# Patient Record
Sex: FEMALE | Race: WHITE | Employment: OTHER | URBAN - NONMETROPOLITAN AREA
[De-identification: names, ages, dates, MRNs, and addresses within clinical notes are randomized per-mention and may not be internally consistent; named-entity substitution may affect disease eponyms.]

---

## 2017-01-28 ENCOUNTER — HOSPITAL ENCOUNTER (EMERGENCY)
Age: 82
Discharge: HOME | End: 2017-01-28
Attending: EMERGENCY MEDICINE
Payer: MEDICARE

## 2017-01-28 ENCOUNTER — APPOINTMENT (OUTPATIENT)
Dept: CT IMAGING | Age: 82
End: 2017-01-28
Payer: MEDICARE

## 2017-01-28 VITALS
HEART RATE: 59 BPM | DIASTOLIC BLOOD PRESSURE: 88 MMHG | WEIGHT: 138 LBS | TEMPERATURE: 98.3 F | BODY MASS INDEX: 23.56 KG/M2 | RESPIRATION RATE: 18 BRPM | SYSTOLIC BLOOD PRESSURE: 177 MMHG | HEIGHT: 64 IN | OXYGEN SATURATION: 95 %

## 2017-01-28 DIAGNOSIS — R42 DIZZINESS: Primary | ICD-10-CM

## 2017-01-28 LAB
ALBUMIN SERPL-MCNC: 3.7 G/DL (ref 3.5–5.2)
ALP BLD-CCNC: 72 U/L (ref 35–104)
ALT SERPL-CCNC: 16 U/L (ref 5–33)
ANION GAP SERPL CALCULATED.3IONS-SCNC: 12 MMOL/L (ref 7–19)
AST SERPL-CCNC: 23 U/L (ref 5–32)
BASOPHILS ABSOLUTE: 0.1 K/UL (ref 0–0.2)
BASOPHILS RELATIVE PERCENT: 0.7 % (ref 0–1)
BILIRUB SERPL-MCNC: 0.5 MG/DL (ref 0.2–1.2)
BUN BLDV-MCNC: 21 MG/DL (ref 8–23)
CALCIUM SERPL-MCNC: 9 MG/DL (ref 8.8–10.2)
CHLORIDE BLD-SCNC: 103 MMOL/L (ref 98–111)
CO2: 26 MMOL/L (ref 22–29)
CREAT SERPL-MCNC: 1 MG/DL (ref 0.5–0.9)
EOSINOPHILS ABSOLUTE: 0.3 K/UL (ref 0–0.6)
EOSINOPHILS RELATIVE PERCENT: 3.3 % (ref 0–5)
GFR NON-AFRICAN AMERICAN: 52
GLOBULIN: 2.9 G/DL
GLUCOSE BLD-MCNC: 96 MG/DL (ref 74–109)
HCT VFR BLD CALC: 40.6 % (ref 37–47)
HEMOGLOBIN: 13.5 G/DL (ref 12–16)
LYMPHOCYTES ABSOLUTE: 1.7 K/UL (ref 1.1–4.5)
LYMPHOCYTES RELATIVE PERCENT: 21.3 % (ref 20–40)
MCH RBC QN AUTO: 31.1 PG (ref 27–31)
MCHC RBC AUTO-ENTMCNC: 33.3 G/DL (ref 33–37)
MCV RBC AUTO: 93.5 FL (ref 81–99)
MONOCYTES ABSOLUTE: 0.8 K/UL (ref 0–0.9)
MONOCYTES RELATIVE PERCENT: 10 % (ref 0–10)
NEUTROPHILS ABSOLUTE: 5.2 K/UL (ref 1.5–7.5)
NEUTROPHILS RELATIVE PERCENT: 64.7 % (ref 50–65)
PDW BLD-RTO: 12.9 % (ref 11.5–14.5)
PLATELET # BLD: 219 K/UL (ref 130–400)
PMV BLD AUTO: 10.3 FL (ref 7.4–10.4)
POTASSIUM SERPL-SCNC: 4.4 MMOL/L (ref 3.5–5)
RBC # BLD: 4.34 M/UL (ref 4.2–5.4)
SODIUM BLD-SCNC: 141 MMOL/L (ref 136–145)
TOTAL PROTEIN: 6.6 G/DL (ref 6.6–8.7)
WBC # BLD: 8.1 K/UL (ref 4.8–10.8)

## 2017-01-28 PROCEDURE — 80053 COMPREHEN METABOLIC PANEL: CPT

## 2017-01-28 PROCEDURE — 2580000003 HC RX 258: Performed by: EMERGENCY MEDICINE

## 2017-01-28 PROCEDURE — 93005 ELECTROCARDIOGRAM TRACING: CPT

## 2017-01-28 PROCEDURE — 99283 EMERGENCY DEPT VISIT LOW MDM: CPT | Performed by: EMERGENCY MEDICINE

## 2017-01-28 PROCEDURE — 70450 CT HEAD/BRAIN W/O DYE: CPT

## 2017-01-28 PROCEDURE — 99284 EMERGENCY DEPT VISIT MOD MDM: CPT

## 2017-01-28 PROCEDURE — 85025 COMPLETE CBC W/AUTO DIFF WBC: CPT

## 2017-01-28 PROCEDURE — 36415 COLL VENOUS BLD VENIPUNCTURE: CPT

## 2017-01-28 RX ORDER — BISOPROLOL FUMARATE AND HYDROCHLOROTHIAZIDE 2.5; 6.25 MG/1; MG/1
1 TABLET ORAL DAILY
COMMUNITY
End: 2017-12-01 | Stop reason: SDUPTHER

## 2017-01-28 RX ORDER — METOPROLOL TARTRATE 50 MG/1
50 TABLET, FILM COATED ORAL 2 TIMES DAILY
COMMUNITY
End: 2018-04-11 | Stop reason: SDUPTHER

## 2017-01-28 RX ORDER — SACCHAROMYCES BOULARDII 250 MG
250 CAPSULE ORAL 2 TIMES DAILY
COMMUNITY

## 2017-01-28 RX ORDER — CHOLECALCIFEROL (VITAMIN D3) 125 MCG
5 CAPSULE ORAL NIGHTLY
COMMUNITY

## 2017-01-28 RX ORDER — CALCIUM CARBONATE 500(1250)
500 TABLET ORAL DAILY
COMMUNITY

## 2017-01-28 RX ORDER — 0.9 % SODIUM CHLORIDE 0.9 %
500 INTRAVENOUS SOLUTION INTRAVENOUS ONCE
Status: COMPLETED | OUTPATIENT
Start: 2017-01-28 | End: 2017-01-28

## 2017-01-28 RX ORDER — ACETAMINOPHEN 160 MG
1000 TABLET,DISINTEGRATING ORAL DAILY
COMMUNITY

## 2017-01-28 RX ORDER — ACETAMINOPHEN,DIPHENHYDRAMINE HCL 500; 25 MG/1; MG/1
1 TABLET, FILM COATED ORAL NIGHTLY PRN
COMMUNITY
End: 2021-01-01

## 2017-01-28 RX ORDER — CELECOXIB 200 MG/1
200 CAPSULE ORAL 2 TIMES DAILY
COMMUNITY
End: 2018-06-12 | Stop reason: SDUPTHER

## 2017-01-28 RX ADMIN — SODIUM CHLORIDE 500 ML: 9 INJECTION, SOLUTION INTRAVENOUS at 15:09

## 2017-01-29 ASSESSMENT — ENCOUNTER SYMPTOMS
SHORTNESS OF BREATH: 0
WHEEZING: 0
CHEST TIGHTNESS: 0

## 2017-01-31 ENCOUNTER — HOSPITAL ENCOUNTER (OUTPATIENT)
Dept: WOMENS IMAGING | Age: 82
Discharge: HOME | End: 2017-01-31
Payer: MEDICARE

## 2017-01-31 DIAGNOSIS — Z12.31 ENCOUNTER FOR SCREENING MAMMOGRAM FOR BREAST CANCER: ICD-10-CM

## 2017-01-31 LAB
EKG P AXIS: 56 DEGREES
EKG P-R INTERVAL: 214 MS
EKG Q-T INTERVAL: 450 MS
EKG QRS DURATION: 88 MS
EKG QTC CALCULATION (BAZETT): 433 MS
EKG T AXIS: 57 DEGREES

## 2017-01-31 PROCEDURE — G0202 SCR MAMMO BI INCL CAD: HCPCS

## 2017-08-14 ENCOUNTER — OFFICE VISIT (OUTPATIENT)
Dept: INTERNAL MEDICINE | Age: 82
End: 2017-08-14
Payer: MEDICARE

## 2017-08-14 VITALS
DIASTOLIC BLOOD PRESSURE: 72 MMHG | BODY MASS INDEX: 23.82 KG/M2 | SYSTOLIC BLOOD PRESSURE: 110 MMHG | HEIGHT: 65 IN | OXYGEN SATURATION: 95 % | WEIGHT: 143 LBS | HEART RATE: 70 BPM

## 2017-08-14 DIAGNOSIS — I10 ESSENTIAL HYPERTENSION: ICD-10-CM

## 2017-08-14 DIAGNOSIS — H61.21 IMPACTED CERUMEN OF RIGHT EAR: ICD-10-CM

## 2017-08-14 PROCEDURE — 4040F PNEUMOC VAC/ADMIN/RCVD: CPT | Performed by: INTERNAL MEDICINE

## 2017-08-14 PROCEDURE — 1090F PRES/ABSN URINE INCON ASSESS: CPT | Performed by: INTERNAL MEDICINE

## 2017-08-14 PROCEDURE — G8427 DOCREV CUR MEDS BY ELIG CLIN: HCPCS | Performed by: INTERNAL MEDICINE

## 2017-08-14 PROCEDURE — 1036F TOBACCO NON-USER: CPT | Performed by: INTERNAL MEDICINE

## 2017-08-14 PROCEDURE — 99212 OFFICE O/P EST SF 10 MIN: CPT | Performed by: INTERNAL MEDICINE

## 2017-08-14 PROCEDURE — G8420 CALC BMI NORM PARAMETERS: HCPCS | Performed by: INTERNAL MEDICINE

## 2017-08-14 PROCEDURE — 1123F ACP DISCUSS/DSCN MKR DOCD: CPT | Performed by: INTERNAL MEDICINE

## 2017-08-14 RX ORDER — SOLIFENACIN SUCCINATE 10 MG/1
10 TABLET, FILM COATED ORAL DAILY
COMMUNITY
End: 2018-04-23 | Stop reason: SDUPTHER

## 2017-08-14 ASSESSMENT — PATIENT HEALTH QUESTIONNAIRE - PHQ9
1. LITTLE INTEREST OR PLEASURE IN DOING THINGS: 0
2. FEELING DOWN, DEPRESSED OR HOPELESS: 0
SUM OF ALL RESPONSES TO PHQ9 QUESTIONS 1 & 2: 0
SUM OF ALL RESPONSES TO PHQ QUESTIONS 1-9: 0

## 2017-08-14 ASSESSMENT — ENCOUNTER SYMPTOMS: EYE REDNESS: 0

## 2017-10-16 ENCOUNTER — OFFICE VISIT (OUTPATIENT)
Dept: INTERNAL MEDICINE | Age: 82
End: 2017-10-16
Payer: MEDICARE

## 2017-10-16 VITALS
WEIGHT: 142 LBS | DIASTOLIC BLOOD PRESSURE: 70 MMHG | OXYGEN SATURATION: 97 % | SYSTOLIC BLOOD PRESSURE: 120 MMHG | BODY MASS INDEX: 24.24 KG/M2 | HEIGHT: 64 IN | HEART RATE: 62 BPM

## 2017-10-16 DIAGNOSIS — Z23 NEED FOR INFLUENZA VACCINATION: ICD-10-CM

## 2017-10-16 DIAGNOSIS — R26.81 UNSTEADY GAIT: ICD-10-CM

## 2017-10-16 DIAGNOSIS — L60.0 INGROWN TOENAIL WITHOUT INFECTION: Primary | ICD-10-CM

## 2017-10-16 DIAGNOSIS — N32.81 OVERACTIVE BLADDER: ICD-10-CM

## 2017-10-16 DIAGNOSIS — I10 ESSENTIAL HYPERTENSION: ICD-10-CM

## 2017-10-16 DIAGNOSIS — M15.9 PRIMARY OSTEOARTHRITIS INVOLVING MULTIPLE JOINTS: ICD-10-CM

## 2017-10-16 PROCEDURE — 99214 OFFICE O/P EST MOD 30 MIN: CPT | Performed by: INTERNAL MEDICINE

## 2017-10-16 PROCEDURE — 4040F PNEUMOC VAC/ADMIN/RCVD: CPT | Performed by: INTERNAL MEDICINE

## 2017-10-16 PROCEDURE — G8420 CALC BMI NORM PARAMETERS: HCPCS | Performed by: INTERNAL MEDICINE

## 2017-10-16 PROCEDURE — G0008 ADMIN INFLUENZA VIRUS VAC: HCPCS | Performed by: INTERNAL MEDICINE

## 2017-10-16 PROCEDURE — G8484 FLU IMMUNIZE NO ADMIN: HCPCS | Performed by: INTERNAL MEDICINE

## 2017-10-16 PROCEDURE — 1123F ACP DISCUSS/DSCN MKR DOCD: CPT | Performed by: INTERNAL MEDICINE

## 2017-10-16 PROCEDURE — 1090F PRES/ABSN URINE INCON ASSESS: CPT | Performed by: INTERNAL MEDICINE

## 2017-10-16 PROCEDURE — 90662 IIV NO PRSV INCREASED AG IM: CPT | Performed by: INTERNAL MEDICINE

## 2017-10-16 PROCEDURE — G8427 DOCREV CUR MEDS BY ELIG CLIN: HCPCS | Performed by: INTERNAL MEDICINE

## 2017-10-16 PROCEDURE — 1036F TOBACCO NON-USER: CPT | Performed by: INTERNAL MEDICINE

## 2017-10-17 ASSESSMENT — ENCOUNTER SYMPTOMS
SHORTNESS OF BREATH: 0
SINUS PRESSURE: 0
COUGH: 0
EYE DISCHARGE: 0
EYE REDNESS: 0
ABDOMINAL PAIN: 0
ABDOMINAL DISTENTION: 0
BACK PAIN: 0

## 2017-12-01 RX ORDER — BISOPROLOL FUMARATE AND HYDROCHLOROTHIAZIDE 2.5; 6.25 MG/1; MG/1
TABLET ORAL
Qty: 90 TABLET | Refills: 3 | Status: SHIPPED | OUTPATIENT
Start: 2017-12-01 | End: 2018-10-25 | Stop reason: SDUPTHER

## 2018-04-09 DIAGNOSIS — I10 ESSENTIAL HYPERTENSION: ICD-10-CM

## 2018-04-09 LAB
ALBUMIN SERPL-MCNC: 4 G/DL (ref 3.5–5.2)
ALP BLD-CCNC: 77 U/L (ref 35–104)
ALT SERPL-CCNC: 15 U/L (ref 5–33)
ANION GAP SERPL CALCULATED.3IONS-SCNC: 16 MMOL/L (ref 7–19)
AST SERPL-CCNC: 26 U/L (ref 5–32)
BILIRUB SERPL-MCNC: 0.7 MG/DL (ref 0.2–1.2)
BUN BLDV-MCNC: 19 MG/DL (ref 8–23)
CALCIUM SERPL-MCNC: 10 MG/DL (ref 8.2–9.6)
CHLORIDE BLD-SCNC: 100 MMOL/L (ref 98–111)
CHOLESTEROL, TOTAL: 160 MG/DL (ref 160–199)
CO2: 27 MMOL/L (ref 22–29)
CREAT SERPL-MCNC: 1 MG/DL (ref 0.5–0.9)
GFR NON-AFRICAN AMERICAN: 52
GLUCOSE BLD-MCNC: 87 MG/DL (ref 74–109)
HCT VFR BLD CALC: 44.4 % (ref 37–47)
HDLC SERPL-MCNC: 68 MG/DL (ref 65–121)
HEMOGLOBIN: 14.3 G/DL (ref 12–16)
LDL CHOLESTEROL CALCULATED: 72 MG/DL
MCH RBC QN AUTO: 30.4 PG (ref 27–31)
MCHC RBC AUTO-ENTMCNC: 32.2 G/DL (ref 33–37)
MCV RBC AUTO: 94.3 FL (ref 81–99)
PDW BLD-RTO: 13.3 % (ref 11.5–14.5)
PLATELET # BLD: 238 K/UL (ref 130–400)
PMV BLD AUTO: 10.7 FL (ref 9.4–12.3)
POTASSIUM SERPL-SCNC: 4.9 MMOL/L (ref 3.5–5)
RBC # BLD: 4.71 M/UL (ref 4.2–5.4)
SODIUM BLD-SCNC: 143 MMOL/L (ref 136–145)
TOTAL PROTEIN: 7.5 G/DL (ref 6.6–8.7)
TRIGL SERPL-MCNC: 101 MG/DL (ref 0–149)
TSH SERPL DL<=0.05 MIU/L-ACNC: 4.47 UIU/ML (ref 0.27–4.2)
WBC # BLD: 8.8 K/UL (ref 4.8–10.8)

## 2018-04-11 RX ORDER — METOPROLOL TARTRATE 50 MG/1
TABLET, FILM COATED ORAL
Qty: 180 TABLET | Refills: 3 | Status: SHIPPED | OUTPATIENT
Start: 2018-04-11 | End: 2019-04-03 | Stop reason: SDUPTHER

## 2018-04-12 PROBLEM — Z23 NEED FOR INFLUENZA VACCINATION: Status: RESOLVED | Noted: 2017-10-16 | Resolved: 2018-04-12

## 2018-04-19 ENCOUNTER — OFFICE VISIT (OUTPATIENT)
Dept: INTERNAL MEDICINE | Age: 83
End: 2018-04-19
Payer: MEDICARE

## 2018-04-19 VITALS
WEIGHT: 142 LBS | HEART RATE: 62 BPM | DIASTOLIC BLOOD PRESSURE: 72 MMHG | OXYGEN SATURATION: 96 % | RESPIRATION RATE: 18 BRPM | SYSTOLIC BLOOD PRESSURE: 130 MMHG | BODY MASS INDEX: 24.24 KG/M2 | HEIGHT: 64 IN

## 2018-04-19 DIAGNOSIS — I10 ESSENTIAL HYPERTENSION: ICD-10-CM

## 2018-04-19 DIAGNOSIS — M15.9 PRIMARY OSTEOARTHRITIS INVOLVING MULTIPLE JOINTS: ICD-10-CM

## 2018-04-19 DIAGNOSIS — N32.81 OVERACTIVE BLADDER: ICD-10-CM

## 2018-04-19 DIAGNOSIS — Z00.00 ROUTINE GENERAL MEDICAL EXAMINATION AT A HEALTH CARE FACILITY: Primary | ICD-10-CM

## 2018-04-19 PROCEDURE — 99212 OFFICE O/P EST SF 10 MIN: CPT | Performed by: INTERNAL MEDICINE

## 2018-04-19 PROCEDURE — G8427 DOCREV CUR MEDS BY ELIG CLIN: HCPCS | Performed by: INTERNAL MEDICINE

## 2018-04-19 PROCEDURE — 1036F TOBACCO NON-USER: CPT | Performed by: INTERNAL MEDICINE

## 2018-04-19 PROCEDURE — G0439 PPPS, SUBSEQ VISIT: HCPCS | Performed by: INTERNAL MEDICINE

## 2018-04-19 PROCEDURE — 1123F ACP DISCUSS/DSCN MKR DOCD: CPT | Performed by: INTERNAL MEDICINE

## 2018-04-19 PROCEDURE — 4040F PNEUMOC VAC/ADMIN/RCVD: CPT | Performed by: INTERNAL MEDICINE

## 2018-04-19 PROCEDURE — 1090F PRES/ABSN URINE INCON ASSESS: CPT | Performed by: INTERNAL MEDICINE

## 2018-04-19 PROCEDURE — G8420 CALC BMI NORM PARAMETERS: HCPCS | Performed by: INTERNAL MEDICINE

## 2018-04-19 ASSESSMENT — LIFESTYLE VARIABLES: HOW OFTEN DO YOU HAVE A DRINK CONTAINING ALCOHOL: 0

## 2018-04-19 ASSESSMENT — PATIENT HEALTH QUESTIONNAIRE - PHQ9: SUM OF ALL RESPONSES TO PHQ QUESTIONS 1-9: 0

## 2018-04-23 RX ORDER — SOLIFENACIN SUCCINATE 10 MG/1
TABLET, FILM COATED ORAL
Qty: 90 TABLET | Refills: 3 | Status: SHIPPED | OUTPATIENT
Start: 2018-04-23 | End: 2019-01-10 | Stop reason: SDUPTHER

## 2018-04-23 RX ORDER — MONTELUKAST SODIUM 10 MG/1
TABLET ORAL
Qty: 90 TABLET | Refills: 3 | Status: SHIPPED | OUTPATIENT
Start: 2018-04-23 | End: 2019-04-18 | Stop reason: SDUPTHER

## 2018-05-02 ASSESSMENT — ENCOUNTER SYMPTOMS
SINUS PRESSURE: 0
ABDOMINAL PAIN: 0
EYE REDNESS: 0
ABDOMINAL DISTENTION: 0
SHORTNESS OF BREATH: 0
EYE DISCHARGE: 0
COUGH: 0

## 2018-06-12 RX ORDER — CELECOXIB 200 MG/1
CAPSULE ORAL
Qty: 90 CAPSULE | Refills: 3 | Status: SHIPPED | OUTPATIENT
Start: 2018-06-12 | End: 2019-06-08 | Stop reason: SDUPTHER

## 2018-06-12 RX ORDER — FLUTICASONE PROPIONATE 50 MCG
SPRAY, SUSPENSION (ML) NASAL
Qty: 16 G | Refills: 3 | Status: SHIPPED | OUTPATIENT
Start: 2018-06-12 | End: 2020-12-30

## 2018-10-25 ENCOUNTER — OFFICE VISIT (OUTPATIENT)
Dept: INTERNAL MEDICINE | Age: 83
End: 2018-10-25
Payer: MEDICARE

## 2018-10-25 VITALS
BODY MASS INDEX: 23.73 KG/M2 | OXYGEN SATURATION: 97 % | HEART RATE: 62 BPM | DIASTOLIC BLOOD PRESSURE: 70 MMHG | HEIGHT: 64 IN | SYSTOLIC BLOOD PRESSURE: 114 MMHG | WEIGHT: 139 LBS

## 2018-10-25 DIAGNOSIS — N32.81 OVERACTIVE BLADDER: ICD-10-CM

## 2018-10-25 DIAGNOSIS — I10 ESSENTIAL HYPERTENSION: Primary | ICD-10-CM

## 2018-10-25 DIAGNOSIS — N18.30 CHRONIC KIDNEY DISEASE, STAGE III (MODERATE) (HCC): ICD-10-CM

## 2018-10-25 DIAGNOSIS — Z23 NEEDS FLU SHOT: ICD-10-CM

## 2018-10-25 DIAGNOSIS — R26.81 UNSTEADY GAIT: ICD-10-CM

## 2018-10-25 PROCEDURE — 1090F PRES/ABSN URINE INCON ASSESS: CPT | Performed by: INTERNAL MEDICINE

## 2018-10-25 PROCEDURE — 1101F PT FALLS ASSESS-DOCD LE1/YR: CPT | Performed by: INTERNAL MEDICINE

## 2018-10-25 PROCEDURE — G8420 CALC BMI NORM PARAMETERS: HCPCS | Performed by: INTERNAL MEDICINE

## 2018-10-25 PROCEDURE — G0008 ADMIN INFLUENZA VIRUS VAC: HCPCS | Performed by: INTERNAL MEDICINE

## 2018-10-25 PROCEDURE — 1036F TOBACCO NON-USER: CPT | Performed by: INTERNAL MEDICINE

## 2018-10-25 PROCEDURE — 90662 IIV NO PRSV INCREASED AG IM: CPT | Performed by: INTERNAL MEDICINE

## 2018-10-25 PROCEDURE — 1123F ACP DISCUSS/DSCN MKR DOCD: CPT | Performed by: INTERNAL MEDICINE

## 2018-10-25 PROCEDURE — 4040F PNEUMOC VAC/ADMIN/RCVD: CPT | Performed by: INTERNAL MEDICINE

## 2018-10-25 PROCEDURE — 99213 OFFICE O/P EST LOW 20 MIN: CPT | Performed by: INTERNAL MEDICINE

## 2018-10-25 PROCEDURE — G8482 FLU IMMUNIZE ORDER/ADMIN: HCPCS | Performed by: INTERNAL MEDICINE

## 2018-10-25 PROCEDURE — G8427 DOCREV CUR MEDS BY ELIG CLIN: HCPCS | Performed by: INTERNAL MEDICINE

## 2018-10-25 RX ORDER — BISOPROLOL FUMARATE AND HYDROCHLOROTHIAZIDE 2.5; 6.25 MG/1; MG/1
1 TABLET ORAL DAILY
Qty: 90 TABLET | Refills: 3 | Status: SHIPPED | OUTPATIENT
Start: 2018-10-25 | End: 2019-11-25 | Stop reason: SDUPTHER

## 2018-10-25 NOTE — PROGRESS NOTES
Chief Complaint   Patient presents with    6 Month Follow-Up    Arthritis    Hypertension       HPI: Patient is here today for six-month follow-up hypertension overactive bladder unsteady gait and advanced age and other medical problems she does amazingly well diarrhea remains resolved she has some urinary urgency frequency but improved unsteady gait but no falls and minimal complaints of pain or arthralgias. She says she has a little memory loss but this is notable and she really manages very well. Past Medical History:   Diagnosis Date    Essential hypertension 8/14/2017    Hypertension     Impacted cerumen of right ear 8/14/2017       Past Surgical History:   Procedure Laterality Date    APPENDECTOMY      CHOLECYSTECTOMY      HIP SURGERY Bilateral     TONSILLECTOMY         No family history on file. Social History     Social History    Marital status:      Spouse name: N/A    Number of children: N/A    Years of education: N/A     Occupational History    Not on file. Social History Main Topics    Smoking status: Never Smoker    Smokeless tobacco: Never Used    Alcohol use 4.2 oz/week     7 Glasses of wine per week    Drug use: No    Sexual activity: No     Other Topics Concern    Not on file     Social History Narrative    No narrative on file       No Known Allergies    Current Outpatient Prescriptions   Medication Sig Dispense Refill    bisoprolol-hydrochlorothiazide (ZIAC) 2.5-6.25 MG per tablet Take 1 tablet by mouth daily 90 tablet 3    fluticasone (FLONASE) 50 MCG/ACT nasal spray USE 2 SPRAY IN EACH NOSTRIL DAILY AS NEEDED 16 g 3    celecoxib (CELEBREX) 200 MG capsule TAKE 1 CAPSULE DAILY.  90 capsule 3    VESICARE 10 MG tablet TAKE 1 TABLET BY MOUTH EVERY MORNING FOR BLADDER 90 tablet 3    montelukast (SINGULAIR) 10 MG tablet TAKE ONE TABLET BY MOUTH DAILY 90 tablet 3    metoprolol tartrate (LOPRESSOR) 50 MG tablet TAKE 1 TABLET TWICE DAILY 180 tablet 3    30.4 27.0 - 31.0 pg    MCHC 32.2 (L) 33.0 - 37.0 g/dL    RDW 13.3 11.5 - 14.5 %    Platelets 005 472 - 660 K/uL    MPV 10.7 9.4 - 12.3 fL   Comprehensive Metabolic Panel   Result Value Ref Range    Sodium 143 136 - 145 mmol/L    Potassium 4.9 3.5 - 5.0 mmol/L    Chloride 100 98 - 111 mmol/L    CO2 27 22 - 29 mmol/L    Anion Gap 16 7 - 19 mmol/L    Glucose 87 74 - 109 mg/dL    BUN 19 8 - 23 mg/dL    CREATININE 1.0 (H) 0.5 - 0.9 mg/dL    GFR Non-African American 52 (A) >60    Calcium 10.0 (H) 8.2 - 9.6 mg/dL    Total Protein 7.5 6.6 - 8.7 g/dL    Alb 4.0 3.5 - 5.2 g/dL    Total Bilirubin 0.7 0.2 - 1.2 mg/dL    Alkaline Phosphatase 77 35 - 104 U/L    ALT 15 5 - 33 U/L    AST 26 5 - 32 U/L   Lipid Panel   Result Value Ref Range    Cholesterol, Total 160 160 - 199 mg/dL    Triglycerides 101 0 - 149 mg/dL    HDL 68 65 - 121 mg/dL    LDL Calculated 72 <100 mg/dL   TSH without Reflex   Result Value Ref Range    TSH 4.470 (H) 0.270 - 4.200 uIU/mL       ASSESSMENT/ PLAN:  1. Essential hypertension  Blood pressure stable patient is stable she does amazingly well. - CBC; Future  - Comprehensive Metabolic Panel; Future  - TSH without Reflex; Future  - Lipid Panel; Future    2. Overactive bladder  Doing well with current meds and plan of care    3. Unsteady gait  Needs to always use a walker or cane and have caution.     4. Needs flu shot    - Influenza, High Dose, 65 yrs  and older, IM, PF, Prefill Syr, 0.5mL (FLUZONE HD)

## 2018-11-07 PROBLEM — N18.30 CHRONIC KIDNEY DISEASE, STAGE III (MODERATE) (HCC): Status: ACTIVE | Noted: 2018-11-07

## 2018-11-07 ASSESSMENT — ENCOUNTER SYMPTOMS
BACK PAIN: 1
SINUS PRESSURE: 0
EYE REDNESS: 0
SHORTNESS OF BREATH: 0
EYE DISCHARGE: 0
ABDOMINAL PAIN: 0
COUGH: 0
ABDOMINAL DISTENTION: 0

## 2018-11-29 ENCOUNTER — OFFICE VISIT (OUTPATIENT)
Dept: INTERNAL MEDICINE | Age: 83
End: 2018-11-29
Payer: MEDICARE

## 2018-11-29 VITALS
OXYGEN SATURATION: 97 % | BODY MASS INDEX: 23.9 KG/M2 | SYSTOLIC BLOOD PRESSURE: 124 MMHG | TEMPERATURE: 97.9 F | DIASTOLIC BLOOD PRESSURE: 78 MMHG | WEIGHT: 140 LBS | HEART RATE: 62 BPM | HEIGHT: 64 IN

## 2018-11-29 DIAGNOSIS — L25.9 CONTACT DERMATITIS, UNSPECIFIED CONTACT DERMATITIS TYPE, UNSPECIFIED TRIGGER: Primary | ICD-10-CM

## 2018-11-29 PROCEDURE — 99213 OFFICE O/P EST LOW 20 MIN: CPT | Performed by: NURSE PRACTITIONER

## 2018-11-29 PROCEDURE — G8427 DOCREV CUR MEDS BY ELIG CLIN: HCPCS | Performed by: NURSE PRACTITIONER

## 2018-11-29 PROCEDURE — 1090F PRES/ABSN URINE INCON ASSESS: CPT | Performed by: NURSE PRACTITIONER

## 2018-11-29 PROCEDURE — 1036F TOBACCO NON-USER: CPT | Performed by: NURSE PRACTITIONER

## 2018-11-29 PROCEDURE — G8420 CALC BMI NORM PARAMETERS: HCPCS | Performed by: NURSE PRACTITIONER

## 2018-11-29 PROCEDURE — 1101F PT FALLS ASSESS-DOCD LE1/YR: CPT | Performed by: NURSE PRACTITIONER

## 2018-11-29 PROCEDURE — 96372 THER/PROPH/DIAG INJ SC/IM: CPT | Performed by: NURSE PRACTITIONER

## 2018-11-29 PROCEDURE — 4040F PNEUMOC VAC/ADMIN/RCVD: CPT | Performed by: NURSE PRACTITIONER

## 2018-11-29 PROCEDURE — 1123F ACP DISCUSS/DSCN MKR DOCD: CPT | Performed by: NURSE PRACTITIONER

## 2018-11-29 PROCEDURE — G8482 FLU IMMUNIZE ORDER/ADMIN: HCPCS | Performed by: NURSE PRACTITIONER

## 2018-11-29 RX ORDER — TRIAMCINOLONE ACETONIDE 0.25 MG/G
CREAM TOPICAL
Qty: 1 TUBE | Refills: 0 | Status: ON HOLD | OUTPATIENT
Start: 2018-11-29 | End: 2021-07-19

## 2018-11-29 RX ORDER — METHYLPREDNISOLONE ACETATE 40 MG/ML
40 INJECTION, SUSPENSION INTRA-ARTICULAR; INTRALESIONAL; INTRAMUSCULAR; SOFT TISSUE ONCE
Status: COMPLETED | OUTPATIENT
Start: 2018-11-29 | End: 2018-11-29

## 2018-11-29 RX ADMIN — METHYLPREDNISOLONE ACETATE 40 MG: 40 INJECTION, SUSPENSION INTRA-ARTICULAR; INTRALESIONAL; INTRAMUSCULAR; SOFT TISSUE at 11:34

## 2018-11-29 ASSESSMENT — ENCOUNTER SYMPTOMS
SHORTNESS OF BREATH: 0
RHINORRHEA: 0
VOICE CHANGE: 0
VOMITING: 0
BACK PAIN: 0
COLOR CHANGE: 0
NAUSEA: 0
COUGH: 0
PHOTOPHOBIA: 0

## 2018-11-29 NOTE — PROGRESS NOTES
After obtaining consent, and per orders of YOKO Vega, an injection of methylPREDNISolone acetate (DEPO-MEDROL) injection 40 mg  was given in Right upper quad. gluteus by Skyler Pineda. Patient tolerated well with no adverse reaction.
breath. Cardiovascular: Negative for chest pain and palpitations. Gastrointestinal: Negative for nausea and vomiting. Endocrine: Negative. Negative for cold intolerance and heat intolerance. Genitourinary: Negative for difficulty urinating and flank pain. Musculoskeletal: Negative for back pain and neck pain. Skin: Positive for rash. Negative for color change. Allergic/Immunologic: Negative for environmental allergies and food allergies. Neurological: Negative for dizziness, speech difficulty and headaches. Hematological: Does not bruise/bleed easily. Psychiatric/Behavioral: Negative for sleep disturbance and suicidal ideas. Objective:     Physical Exam   Constitutional: She is oriented to person, place, and time. She appears well-developed and well-nourished. HENT:   Head: Atraumatic. Right Ear: External ear normal.   Left Ear: External ear normal.   Nose: Nose normal.   Mouth/Throat: Oropharynx is clear and moist.   Eyes: Pupils are equal, round, and reactive to light. Conjunctivae are normal.   Neck: Normal range of motion. Neck supple. Cardiovascular: Normal rate, regular rhythm, S1 normal, S2 normal and normal heart sounds. Pulmonary/Chest: Effort normal and breath sounds normal.   Abdominal: Soft. Bowel sounds are normal.   Musculoskeletal: Normal range of motion. Neurological: She is alert and oriented to person, place, and time. Skin: Skin is warm and dry. Psychiatric: She has a normal mood and affect. Her behavior is normal.   Nursing note and vitals reviewed. /78 (Site: Left Upper Arm, Position: Sitting)   Pulse 62   Temp 97.9 °F (36.6 °C)   Ht 5' 4\" (1.626 m)   Wt 140 lb (63.5 kg)   SpO2 97%   BMI 24.03 kg/m²     Assessment:       Diagnosis Orders   1. Contact dermatitis, unspecified contact dermatitis type, unspecified trigger         Plan:     1. Steroid injection today. 2. Kenalog cream to affected area. 3. Benadryl as needed for itching.   4.

## 2019-01-10 RX ORDER — SOLIFENACIN SUCCINATE 10 MG/1
TABLET, FILM COATED ORAL
Qty: 90 TABLET | Refills: 3 | Status: SHIPPED | OUTPATIENT
Start: 2019-01-10 | End: 2020-01-17 | Stop reason: SDUPTHER

## 2019-04-03 RX ORDER — METOPROLOL TARTRATE 50 MG/1
TABLET, FILM COATED ORAL
Qty: 180 TABLET | Refills: 3 | Status: SHIPPED | OUTPATIENT
Start: 2019-04-03 | End: 2020-03-24 | Stop reason: SDUPTHER

## 2019-04-19 RX ORDER — MONTELUKAST SODIUM 10 MG/1
TABLET ORAL
Qty: 90 TABLET | Refills: 3 | Status: SHIPPED | OUTPATIENT
Start: 2019-04-19 | End: 2020-03-10 | Stop reason: SDUPTHER

## 2019-04-29 ENCOUNTER — OFFICE VISIT (OUTPATIENT)
Dept: INTERNAL MEDICINE | Age: 84
End: 2019-04-29
Payer: MEDICARE

## 2019-04-29 VITALS
DIASTOLIC BLOOD PRESSURE: 60 MMHG | SYSTOLIC BLOOD PRESSURE: 110 MMHG | BODY MASS INDEX: 23.73 KG/M2 | HEIGHT: 64 IN | OXYGEN SATURATION: 93 % | WEIGHT: 139 LBS | HEART RATE: 70 BPM

## 2019-04-29 DIAGNOSIS — M15.9 PRIMARY OSTEOARTHRITIS INVOLVING MULTIPLE JOINTS: ICD-10-CM

## 2019-04-29 DIAGNOSIS — Z00.00 ROUTINE GENERAL MEDICAL EXAMINATION AT A HEALTH CARE FACILITY: ICD-10-CM

## 2019-04-29 DIAGNOSIS — I10 ESSENTIAL HYPERTENSION: ICD-10-CM

## 2019-04-29 DIAGNOSIS — R26.81 UNSTEADY GAIT: ICD-10-CM

## 2019-04-29 DIAGNOSIS — N32.81 OVERACTIVE BLADDER: ICD-10-CM

## 2019-04-29 DIAGNOSIS — Z00.00 MEDICARE ANNUAL WELLNESS VISIT, SUBSEQUENT: Primary | ICD-10-CM

## 2019-04-29 DIAGNOSIS — R26.89 BALANCE DISORDER: ICD-10-CM

## 2019-04-29 DIAGNOSIS — Z23 NEED FOR PROPHYLACTIC VACCINATION AND INOCULATION AGAINST VARICELLA: ICD-10-CM

## 2019-04-29 DIAGNOSIS — N18.30 CHRONIC KIDNEY DISEASE, STAGE III (MODERATE) (HCC): ICD-10-CM

## 2019-04-29 DIAGNOSIS — Z23 NEED FOR PROPHYLACTIC VACCINATION AGAINST STREPTOCOCCUS PNEUMONIAE (PNEUMOCOCCUS): ICD-10-CM

## 2019-04-29 LAB
ALBUMIN SERPL-MCNC: 4 G/DL (ref 3.5–5.2)
ALP BLD-CCNC: 74 U/L (ref 35–104)
ALT SERPL-CCNC: 12 U/L (ref 5–33)
ANION GAP SERPL CALCULATED.3IONS-SCNC: 10 MMOL/L (ref 7–19)
AST SERPL-CCNC: 22 U/L (ref 5–32)
BILIRUB SERPL-MCNC: 0.4 MG/DL (ref 0.2–1.2)
BUN BLDV-MCNC: 24 MG/DL (ref 8–23)
CALCIUM SERPL-MCNC: 9.5 MG/DL (ref 8.2–9.6)
CHLORIDE BLD-SCNC: 100 MMOL/L (ref 98–111)
CO2: 29 MMOL/L (ref 22–29)
CREAT SERPL-MCNC: 1 MG/DL (ref 0.5–0.9)
GFR NON-AFRICAN AMERICAN: 52
GLUCOSE BLD-MCNC: 120 MG/DL (ref 74–109)
HCT VFR BLD CALC: 44.9 % (ref 37–47)
HEMOGLOBIN: 14.3 G/DL (ref 12–16)
MCH RBC QN AUTO: 31.3 PG (ref 27–31)
MCHC RBC AUTO-ENTMCNC: 31.8 G/DL (ref 33–37)
MCV RBC AUTO: 98.2 FL (ref 81–99)
PDW BLD-RTO: 12.8 % (ref 11.5–14.5)
PLATELET # BLD: 249 K/UL (ref 130–400)
PMV BLD AUTO: 10.6 FL (ref 9.4–12.3)
POTASSIUM SERPL-SCNC: 4.7 MMOL/L (ref 3.5–5)
RBC # BLD: 4.57 M/UL (ref 4.2–5.4)
SODIUM BLD-SCNC: 139 MMOL/L (ref 136–145)
TOTAL PROTEIN: 7.5 G/DL (ref 6.6–8.7)
TSH SERPL DL<=0.05 MIU/L-ACNC: 5.11 UIU/ML (ref 0.27–4.2)
WBC # BLD: 8.4 K/UL (ref 4.8–10.8)

## 2019-04-29 PROCEDURE — G8420 CALC BMI NORM PARAMETERS: HCPCS | Performed by: INTERNAL MEDICINE

## 2019-04-29 PROCEDURE — 1036F TOBACCO NON-USER: CPT | Performed by: INTERNAL MEDICINE

## 2019-04-29 PROCEDURE — 90732 PPSV23 VACC 2 YRS+ SUBQ/IM: CPT | Performed by: INTERNAL MEDICINE

## 2019-04-29 PROCEDURE — G0438 PPPS, INITIAL VISIT: HCPCS | Performed by: INTERNAL MEDICINE

## 2019-04-29 PROCEDURE — 99213 OFFICE O/P EST LOW 20 MIN: CPT | Performed by: INTERNAL MEDICINE

## 2019-04-29 PROCEDURE — 4040F PNEUMOC VAC/ADMIN/RCVD: CPT | Performed by: INTERNAL MEDICINE

## 2019-04-29 PROCEDURE — G0009 ADMIN PNEUMOCOCCAL VACCINE: HCPCS | Performed by: INTERNAL MEDICINE

## 2019-04-29 PROCEDURE — G8427 DOCREV CUR MEDS BY ELIG CLIN: HCPCS | Performed by: INTERNAL MEDICINE

## 2019-04-29 PROCEDURE — 1123F ACP DISCUSS/DSCN MKR DOCD: CPT | Performed by: INTERNAL MEDICINE

## 2019-04-29 PROCEDURE — 1090F PRES/ABSN URINE INCON ASSESS: CPT | Performed by: INTERNAL MEDICINE

## 2019-04-29 ASSESSMENT — PATIENT HEALTH QUESTIONNAIRE - PHQ9
SUM OF ALL RESPONSES TO PHQ QUESTIONS 1-9: 0
SUM OF ALL RESPONSES TO PHQ QUESTIONS 1-9: 0

## 2019-04-29 ASSESSMENT — LIFESTYLE VARIABLES: HOW OFTEN DO YOU HAVE A DRINK CONTAINING ALCOHOL: 0

## 2019-04-29 NOTE — PROGRESS NOTES
Medicare Annual Wellness Visit  Name: Kala Gamble Date: 2019   MRN: [id number] Sex: Female   Age: 80 y.o. Ethnicity: Non-/Non    : 10/14/1926 Race: Sean Wang is here for Medicare AWV and Hypertension    Screenings for behavioral, psychosocial and functional/safety risks, and cognitive dysfunction are all negative except as indicated below. These results, as well as other patient data from the [address] form, are documented in Flowsheets linked to this Encounter. No Known Allergies    Prior to Visit Medications    Medication Sig Taking? Authorizing Provider   montelukast (SINGULAIR) 10 MG tablet TAKE ONE TABLET BY MOUTH DAILY  Santos Saxena MD   metoprolol tartrate (LOPRESSOR) 50 MG tablet TAKE 1 TABLET TWICE DAILY  Santos Saxena MD   solifenacin (VESICARE) 10 MG tablet TAKE 1 TABLET BY MOUTH EVERY MORNING FOR BLADDER  Santos Saxena MD   triamcinolone (KENALOG) 0.025 % cream Apply Topically  YOKO Hughes   bisoprolol-hydrochlorothiazide (ZIAC) 2.5-6.25 MG per tablet Take 1 tablet by mouth daily  Santos Saxena MD   fluticasone (FLONASE) 50 MCG/ACT nasal spray USE 2 SPRAY IN EACH NOSTRIL DAILY AS NEEDED  Santos Saxena MD   celecoxib (CELEBREX) 200 MG capsule TAKE 1 CAPSULE DAILY.   Santos Saxena MD   calcium carbonate (OSCAL) 500 MG TABS tablet Take 500 mg by mouth daily  Historical Provider, MD   saccharomyces boulardii (FLORASTOR) 250 MG capsule Take 250 mg by mouth 2 times daily  Historical Provider, MD   aspirin 81 MG tablet Take 81 mg by mouth daily  Historical Provider, MD   Cholecalciferol (VITAMIN D3) 2000 UNITS CAPS Take by mouth  Historical Provider, MD   Cetirizine HCl (ZYRTEC ALLERGY PO) Take by mouth  Historical Provider, MD   diphenhydrAMINE-APAP, sleep, (TYLENOL PM EXTRA STRENGTH)  MG tablet Take 1 tablet by mouth nightly as needed for Sleep  Historical Provider, MD   melatonin 5 MG TABS tablet Take 5 mg by mouth daily  Historical Provider, MD       Past Medical History:   Diagnosis Date    Essential hypertension 8/14/2017    Hypertension     Impacted cerumen of right ear 8/14/2017     Past Surgical History:   Procedure Laterality Date    APPENDECTOMY      CHOLECYSTECTOMY      HIP SURGERY Bilateral     TONSILLECTOMY       No family history on file. CareTeam (Including outside providers/suppliers regularly involved in providing care):   Patient Care Team:  Colten Redd MD as PCP - General (Internal Medicine)  Colten Redd MD as PCP - S Attributed Provider    Wt Readings from Last 3 Encounters:   04/29/19 139 lb (63 kg)   11/29/18 140 lb (63.5 kg)   10/25/18 139 lb (63 kg)     Vitals:    04/29/19 1246   BP: 110/60   Site: Left Upper Arm   Pulse: 70   SpO2: 93%   Weight: 139 lb (63 kg)   Height: 5' 4\" (1.626 m)     Body mass index is 23.86 kg/m². Based upon direct observation of the patient, evaluation of cognition reveals none    Patient's complete Health Risk Assessment and screening values have been reviewed and are found in Flowsheets. The following problems were reviewed today and where indicated follow up appointments were made and/or referrals ordered. Positive Risk Factor Screenings with Interventions:     Hearing/Vision:  Hearing/Vision  Do you or your family notice any trouble with your hearing?: (!) Yes  Do you have difficulty driving, watching TV, or doing any of your daily activities because of your eyesight?: No  Have you had an eye exam within the past year?: Yes  Hearing/Vision Interventions:  ·  stable    ADL:  ADLs  In the past 7 days, did you need help from others to perform any of the following everyday activities? Eating, dressing, grooming, bathing, toileting, or walking/balance?: None  In the past 7 days, did you need help from others to take care of any of the following?  Laundry, housekeeping, banking/finances, shopping, telephone use, food preparation, transportation, or taking medications?: (!) Transportation, Housekeeping  ADL Interventions:  · Lots of family support    Personalized Preventive Plan   Current Health Maintenance Status  Immunization History   Administered Date(s) Administered    Influenza, High Dose (Fluzone 65 yrs and older) 10/16/2017, 10/25/2018    Pneumococcal 13-valent Conjugate ([id number]) 10/22/2015    Pneumococcal Polysaccharide ([id number]) 04/29/2019    Varicella (Varivax) 04/08/2015        Health Maintenance   Topic Date Due    DTaP/Tdap/Td vaccine (1 - Tdap) 10/14/1945    Shingles Vaccine (1 of 2) 06/03/2015    Potassium monitoring  04/29/2020    Creatinine monitoring  04/29/2020    Flu vaccine  Completed    Pneumococcal 65+ years Vaccine  Completed     Recommendations for Preventive Services Due: see orders and patient instructions/AVS.  . Recommended screening schedule for the next 5-10 years is provided to the patient in written form: see Patient Instructions/AVS.        Chief Complaint   Patient presents with    Medicare AWV    Hypertension       HPI: Patient is here today for Medicare annual wellness visit and to follow-up hypertension and advanced age allergies arthritis stress urinary incontinence urge urinary incontinence and other medical problems stable with medication managing well lives alone but lots of family support no recent issues no chest pressure chest pain no dyspnea no abdominal pain stable arthralgias stable fatigue overall doing well. Past Medical History:   Diagnosis Date    Essential hypertension 8/14/2017    Hypertension     Impacted cerumen of right ear 8/14/2017       Past Surgical History:   Procedure Laterality Date    APPENDECTOMY      CHOLECYSTECTOMY      HIP SURGERY Bilateral     TONSILLECTOMY         No family history on file. Social History     Socioeconomic History    Marital status:       Spouse name: Not on file    Number of children: Not on file    Years of education: Not on file    Highest education level: Not on file   Occupational History    Not on file   Social Needs    Financial resource strain: Not on file    Food insecurity:     Worry: Not on file     Inability: Not on file    Transportation needs:     Medical: Not on file     Non-medical: Not on file   Tobacco Use    Smoking status: Never Smoker    Smokeless tobacco: Never Used   Substance and Sexual Activity    Alcohol use: Yes     Alcohol/week: 4.2 oz     Types: 7 Glasses of wine per week    Drug use: No    Sexual activity: Never   Lifestyle    Physical activity:     Days per week: Not on file     Minutes per session: Not on file    Stress: Not on file   Relationships    Social connections:     Talks on phone: Not on file     Gets together: Not on file     Attends Orthodox service: Not on file     Active member of club or organization: Not on file     Attends meetings of clubs or organizations: Not on file     Relationship status: Not on file    Intimate partner violence:     Fear of current or ex partner: Not on file     Emotionally abused: Not on file     Physically abused: Not on file     Forced sexual activity: Not on file   Other Topics Concern    Not on file   Social History Narrative    Not on file       No Known Allergies    Current Outpatient Medications   Medication Sig Dispense Refill    montelukast (SINGULAIR) 10 MG tablet TAKE ONE TABLET BY MOUTH DAILY 90 tablet 3    metoprolol tartrate (LOPRESSOR) 50 MG tablet TAKE 1 TABLET TWICE DAILY 180 tablet 3    solifenacin (VESICARE) 10 MG tablet TAKE 1 TABLET BY MOUTH EVERY MORNING FOR BLADDER 90 tablet 3    triamcinolone (KENALOG) 0.025 % cream Apply Topically 1 Tube 0    bisoprolol-hydrochlorothiazide (ZIAC) 2.5-6.25 MG per tablet Take 1 tablet by mouth daily 90 tablet 3    fluticasone (FLONASE) 50 MCG/ACT nasal spray USE 2 SPRAY IN EACH NOSTRIL DAILY AS NEEDED 16 g 3    celecoxib (CELEBREX) 200 MG capsule TAKE 1 CAPSULE DAILY.  90 capsule 3    calcium carbonate (OSCAL) 500 MG TABS tablet Take 500 mg by mouth daily      saccharomyces boulardii (FLORASTOR) 250 MG capsule Take 250 mg by mouth 2 times daily      aspirin 81 MG tablet Take 81 mg by mouth daily      Cholecalciferol (VITAMIN D3) 2000 UNITS CAPS Take by mouth      Cetirizine HCl (ZYRTEC ALLERGY PO) Take by mouth      diphenhydrAMINE-APAP, sleep, (TYLENOL PM EXTRA STRENGTH)  MG tablet Take 1 tablet by mouth nightly as needed for Sleep      melatonin 5 MG TABS tablet Take 5 mg by mouth daily       No current facility-administered medications for this visit. Review of Systems   Constitutional: Positive for fatigue. Negative for chills and fever. HENT: Positive for hearing loss. Negative for congestion and sinus pressure. Eyes: Negative for discharge and redness. Respiratory: Negative for cough. Cardiovascular: Negative for chest pain, palpitations and leg swelling. Gastrointestinal: Negative for abdominal distention and abdominal pain. Genitourinary: Positive for frequency and urgency. Negative for dysuria. Musculoskeletal: Positive for arthralgias and gait problem. Negative for back pain. Skin: Negative for rash and wound. Neurological: Negative for dizziness, light-headedness and headaches. Psychiatric/Behavioral: Negative for dysphoric mood and sleep disturbance. The patient is not nervous/anxious.         /60 (Site: Left Upper Arm)   Pulse 70   Ht 5' 4\" (1.626 m)   Wt 139 lb (63 kg)   SpO2 93%   BMI 23.86 kg/m²   BP Readings from Last 7 Encounters:   04/29/19 110/60   11/29/18 124/78   10/25/18 114/70   04/19/18 130/72   10/16/17 120/70   08/14/17 110/72   01/28/17 (!) 177/88     Wt Readings from Last 7 Encounters:   04/29/19 139 lb (63 kg)   11/29/18 140 lb (63.5 kg)   10/25/18 139 lb (63 kg)   04/19/18 142 lb (64.4 kg)   10/16/17 142 lb (64.4 kg)   08/14/17 143 lb (64.9 kg)   01/28/17 138 lb (62.6 kg)     BMI Readings from Last 7 Encounters:   04/29/19 23.86 kg/m² Sodium 143 136 - 145 mmol/L    Potassium 4.9 3.5 - 5.0 mmol/L    Chloride 100 98 - 111 mmol/L    CO2 27 22 - 29 mmol/L    Anion Gap 16 7 - 19 mmol/L    Glucose 87 74 - 109 mg/dL    BUN 19 8 - 23 mg/dL    CREATININE 1.0 (H) 0.5 - 0.9 mg/dL    GFR Non-African American 52 (A) >60    Calcium 10.0 (H) 8.2 - 9.6 mg/dL    Total Protein 7.5 6.6 - 8.7 g/dL    Alb 4.0 3.5 - 5.2 g/dL    Total Bilirubin 0.7 0.2 - 1.2 mg/dL    Alkaline Phosphatase 77 35 - 104 U/L    ALT 15 5 - 33 U/L    AST 26 5 - 32 U/L   Lipid Panel   Result Value Ref Range    Cholesterol, Total 160 160 - 199 mg/dL    Triglycerides 101 0 - 149 mg/dL    HDL 68 65 - 121 mg/dL    LDL Calculated 72 <100 mg/dL   TSH without Reflex   Result Value Ref Range    TSH 4.470 (H) 0.270 - 4.200 uIU/mL       ASSESSMENT/ PLAN:  1. Medicare annual wellness visit, subsequent  Chart medications labs vaccines reviewed keep up-to-date with routine medical care and follow-up call with any problems or complaints    2. Balance disorder  We are going to refer her to physical therapy also use walker or cane  - External Referral To Physical Therapy    3. Essential hypertension  Stable follow    4. Chronic kidney disease, stage III (moderate) ([facility])  Stable follow    5. Overactive bladder  Current medications working well    6. Need for prophylactic vaccination and inoculation against varicella    - zoster recombinant adjuvanted vaccine [facility]) 50 MCG/0.5ML SUSR injection; Inject 0.5 mLs into the muscle once for 1 dose 50 MCG IM then repeat 2-6 months. Dispense: 1 each; Refill: 1    7. Unsteady gait  Caution against falling refer to physical therapy    8. Routine general medical examination at a health care facility  Keep up-to-date    9. Primary osteoarthritis involving multiple joints  Stable    10.  Need for prophylactic vaccination against Streptococcus pneumoniae (pneumococcus)    - Pneumococcal polysaccharide vaccine 23-valent PPSV23      Quality & Risk Score Accuracy    Visit Dx: N18.3 - Chronic kidney disease, stage III (moderate) (HCC)  Assessment and plan:  Stable based upon symptoms and exam. Continue current treatment plan and follow up at least yearly.   Last edited 05/08/19 21:59 CDT by Saint Motto, MD

## 2019-04-29 NOTE — PATIENT INSTRUCTIONS
Personalized Preventive Plan for Thai Ritchie - 4/29/2019  Medicare offers a range of preventive health benefits. Some of the tests and screenings are paid in full while other may be subject to a deductible, co-insurance, and/or copay. Some of these benefits include a comprehensive review of your medical history including lifestyle, illnesses that may run in your family, and various assessments and screenings as appropriate. After reviewing your medical record and screening and assessments performed today your provider may have ordered immunizations, labs, imaging, and/or referrals for you. A list of these orders (if applicable) as well as your Preventive Care list are included within your After Visit Summary for your review. Other Preventive Recommendations:    · A preventive eye exam performed by an eye specialist is recommended every 1-2 years to screen for glaucoma; cataracts, macular degeneration, and other eye disorders. · A preventive dental visit is recommended every 6 months. · Try to get at least 150 minutes of exercise per week or 10,000 steps per day on a pedometer . · Order or download the FREE \"Exercise & Physical Activity: Your Everyday Guide\" from [employer]. Call 8-329.419.1396 or search [employer] online. · You need 0960-1437 mg of calcium and 5595-9576 IU of vitamin D per day. It is possible to meet your calcium requirement with diet alone, but a vitamin D supplement is usually necessary to meet this goal.  · When exposed to the sun, use a sunscreen that protects against both UVA and UVB radiation with an SPF of 30 or greater. Reapply every 2 to 3 hours or after sweating, drying off with a towel, or swimming. · Always wear a seat belt when traveling in a car. Always wear a helmet when riding a bicycle or motorcycle. Personalized Preventive Plan for Thai Ritchie - 4/29/2019  Medicare offers a range of preventive health benefits.  Some of the tests and screenings are paid in full while other may be subject to a deductible, co-insurance, and/or copay. Some of these benefits include a comprehensive review of your medical history including lifestyle, illnesses that may run in your family, and various assessments and screenings as appropriate. After reviewing your medical record and screening and assessments performed today your provider may have ordered immunizations, labs, imaging, and/or referrals for you. A list of these orders (if applicable) as well as your Preventive Care list are included within your After Visit Summary for your review. Other Preventive Recommendations:    A preventive eye exam performed by an eye specialist is recommended every 1-2 years to screen for glaucoma; cataracts, macular degeneration, and other eye disorders. A preventive dental visit is recommended every 6 months. Try to get at least 150 minutes of exercise per week or 10,000 steps per day on a pedometer . Order or download the FREE \"Exercise & Physical Activity: Your Everyday Guide\" from [employer]. Call 6-306.453.2858 or search [employer] online. You need 6169-4788 mg of calcium and 6833-6571 IU of vitamin D per day. It is possible to meet your calcium requirement with diet alone, but a vitamin D supplement is usually necessary to meet this goal.  When exposed to the sun, use a sunscreen that protects against both UVA and UVB radiation with an SPF of 30 or greater. Reapply every 2 to 3 hours or after sweating, drying off with a towel, or swimming. Always wear a seat belt when traveling in a car. Always wear a helmet when riding a bicycle or motorcycle.

## 2019-05-02 ENCOUNTER — HOSPITAL ENCOUNTER (OUTPATIENT)
Dept: GENERAL RADIOLOGY | Age: 84
Discharge: HOME | End: 2019-05-02
Payer: MEDICARE

## 2019-05-02 DIAGNOSIS — D86.0 SARCOIDOSIS, LUNG (HCC): ICD-10-CM

## 2019-05-02 LAB
BASOPHILS ABSOLUTE: 0.1 K/UL (ref 0–0.2)
BASOPHILS RELATIVE PERCENT: 0.8 % (ref 0–1)
C-REACTIVE PROTEIN: 0.58 MG/DL (ref 0–0.5)
EOSINOPHILS ABSOLUTE: 0.4 K/UL (ref 0–0.6)
EOSINOPHILS RELATIVE PERCENT: 4.2 % (ref 0–5)
HCT VFR BLD CALC: 44.4 % (ref 37–47)
HEMOGLOBIN: 13.9 G/DL (ref 12–16)
IMMATURE GRANULOCYTES #: 0 K/UL
LYMPHOCYTES ABSOLUTE: 1.9 K/UL (ref 1.1–4.5)
LYMPHOCYTES RELATIVE PERCENT: 20.7 % (ref 20–40)
MCH RBC QN AUTO: 30.1 PG (ref 27–31)
MCHC RBC AUTO-ENTMCNC: 31.3 G/DL (ref 33–37)
MCV RBC AUTO: 96.1 FL (ref 81–99)
MONOCYTES ABSOLUTE: 1.1 K/UL (ref 0–0.9)
MONOCYTES RELATIVE PERCENT: 11.8 % (ref 0–10)
NEUTROPHILS ABSOLUTE: 5.6 K/UL (ref 1.5–7.5)
NEUTROPHILS RELATIVE PERCENT: 62.1 % (ref 50–65)
PDW BLD-RTO: 12.8 % (ref 11.5–14.5)
PLATELET # BLD: 258 K/UL (ref 130–400)
PMV BLD AUTO: 10.5 FL (ref 9.4–12.3)
RBC # BLD: 4.62 M/UL (ref 4.2–5.4)
RHEUMATOID FACTOR: <10 IU/ML
SEDIMENTATION RATE, ERYTHROCYTE: 16 MM/HR (ref 0–25)
WBC # BLD: 9 K/UL (ref 4.8–10.8)

## 2019-05-02 PROCEDURE — 71046 X-RAY EXAM CHEST 2 VIEWS: CPT

## 2019-05-04 LAB
ANA IGG, ELISA: NORMAL
ANGIOTENSIN CONVERTING ENZYME: 25 U/L (ref 9–67)
QUANTI TB GOLD PLUS: NEGATIVE
QUANTI TB1 MINUS NIL: 0 IU/ML (ref 0–0.34)
QUANTI TB2 MINUS NIL: 0 IU/ML (ref 0–0.34)
QUANTIFERON MITOGEN: 7.47 IU/ML
QUANTIFERON NIL: 0.06 IU/ML
TOXOPLASMA GONDI AB IGM: <3 AU/ML
TOXOPLASMA GONDII AB IGG: 11.9 IU/ML

## 2019-05-05 LAB
HERPES TYPE 1/2 IGM COMBINED: 0.73 IV
HERPES TYPE I/II IGG COMBINED: 0.34 IV
LYME (B. BURGDORFERI) AB IGG WB: NEGATIVE
LYME AB IGM BY WB:: NEGATIVE
VARICELLA ZOSTER AB IGM: 0.29 ISR
VZV IGG SER QL IA: 1143 IV

## 2019-05-06 LAB
BARTONELLA HENSELAE AB, IGG: NORMAL
BARTONELLA HENSELAE AB, IGM: NORMAL
RPR: NORMAL

## 2019-05-08 ASSESSMENT — ENCOUNTER SYMPTOMS
COUGH: 0
ABDOMINAL DISTENTION: 0
SINUS PRESSURE: 0
EYE DISCHARGE: 0
BACK PAIN: 0
EYE REDNESS: 0
ABDOMINAL PAIN: 0

## 2019-06-10 RX ORDER — CELECOXIB 200 MG/1
CAPSULE ORAL
Qty: 90 CAPSULE | Refills: 3 | Status: SHIPPED | OUTPATIENT
Start: 2019-06-10 | End: 2020-05-19

## 2019-07-07 ENCOUNTER — APPOINTMENT (OUTPATIENT)
Dept: CT IMAGING | Age: 84
End: 2019-07-07
Payer: MEDICARE

## 2019-07-07 ENCOUNTER — HOSPITAL ENCOUNTER (EMERGENCY)
Age: 84
Discharge: HOME | End: 2019-07-07
Attending: EMERGENCY MEDICINE
Payer: MEDICARE

## 2019-07-07 VITALS
OXYGEN SATURATION: 92 % | TEMPERATURE: 96.9 F | DIASTOLIC BLOOD PRESSURE: 56 MMHG | SYSTOLIC BLOOD PRESSURE: 153 MMHG | RESPIRATION RATE: 15 BRPM | HEART RATE: 59 BPM

## 2019-07-07 DIAGNOSIS — S01.01XA LACERATION OF SCALP, INITIAL ENCOUNTER: ICD-10-CM

## 2019-07-07 DIAGNOSIS — R03.0 ELEVATED BLOOD PRESSURE READING: ICD-10-CM

## 2019-07-07 DIAGNOSIS — S09.90XA INJURY OF HEAD, INITIAL ENCOUNTER: Primary | ICD-10-CM

## 2019-07-07 PROCEDURE — 99284 EMERGENCY DEPT VISIT MOD MDM: CPT

## 2019-07-07 PROCEDURE — 70450 CT HEAD/BRAIN W/O DYE: CPT

## 2019-07-07 PROCEDURE — 12001 RPR S/N/AX/GEN/TRNK 2.5CM/<: CPT | Performed by: NURSE PRACTITIONER

## 2019-07-07 PROCEDURE — 72125 CT NECK SPINE W/O DYE: CPT

## 2019-07-07 PROCEDURE — 12001 RPR S/N/AX/GEN/TRNK 2.5CM/<: CPT

## 2019-07-07 PROCEDURE — 99284 EMERGENCY DEPT VISIT MOD MDM: CPT | Performed by: EMERGENCY MEDICINE

## 2019-07-07 ASSESSMENT — ENCOUNTER SYMPTOMS
ABDOMINAL PAIN: 0
VOMITING: 0
BACK PAIN: 0
EYE PAIN: 0
SHORTNESS OF BREATH: 0

## 2019-07-13 ENCOUNTER — HOSPITAL ENCOUNTER (EMERGENCY)
Age: 84
Discharge: HOME | End: 2019-07-13
Payer: MEDICARE

## 2019-07-13 VITALS
TEMPERATURE: 98.2 F | WEIGHT: 139 LBS | HEIGHT: 64 IN | HEART RATE: 65 BPM | RESPIRATION RATE: 16 BRPM | BODY MASS INDEX: 23.73 KG/M2 | SYSTOLIC BLOOD PRESSURE: 142 MMHG | DIASTOLIC BLOOD PRESSURE: 73 MMHG | OXYGEN SATURATION: 99 %

## 2019-07-13 DIAGNOSIS — Z48.02 ENCOUNTER FOR STAPLE REMOVAL: Primary | ICD-10-CM

## 2019-07-13 PROCEDURE — 99024 POSTOP FOLLOW-UP VISIT: CPT | Performed by: NURSE PRACTITIONER

## 2019-07-13 PROCEDURE — 4500000002 HC ER NO CHARGE

## 2019-07-13 NOTE — ED PROVIDER NOTES
Apply Topically, Disp-1 Tube, R-0, Normal      bisoprolol-hydrochlorothiazide (ZIAC) 2.5-6.25 MG per tablet Take 1 tablet by mouth daily, Disp-90 tablet, R-3Normal      fluticasone (FLONASE) 50 MCG/ACT nasal spray USE 2 SPRAY IN EACH NOSTRIL DAILY AS NEEDED, Disp-16 g, R-3Normal      calcium carbonate (OSCAL) 500 MG TABS tablet Take 500 mg by mouth daily      saccharomyces boulardii (FLORASTOR) 250 MG capsule Take 250 mg by mouth 2 times daily      aspirin 81 MG tablet Take 81 mg by mouth daily      Cholecalciferol (VITAMIN D3) 2000 UNITS CAPS Take by mouth      Cetirizine HCl (ZYRTEC ALLERGY PO) Take by mouth      diphenhydrAMINE-APAP, sleep, (TYLENOL PM EXTRA STRENGTH)  MG tablet Take 1 tablet by mouth nightly as needed for Sleep      melatonin 5 MG TABS tablet Take 5 mg by mouth daily             ALLERGIES     Patient has no known allergies. FAMILY HISTORY     No family history on file. SOCIAL HISTORY       Social History     Socioeconomic History    Marital status:      Spouse name: Not on file    Number of children: Not on file    Years of education: Not on file    Highest education level: Not on file   Occupational History    Not on file   Social Needs    Financial resource strain: Not on file    Food insecurity:     Worry: Not on file     Inability: Not on file    Transportation needs:     Medical: Not on file     Non-medical: Not on file   Tobacco Use    Smoking status: Never Smoker    Smokeless tobacco: Never Used   Substance and Sexual Activity    Alcohol use:  Yes     Alcohol/week: 7.0 standard drinks     Types: 7 Glasses of wine per week    Drug use: No    Sexual activity: Never   Lifestyle    Physical activity:     Days per week: Not on file     Minutes per session: Not on file    Stress: Not on file   Relationships    Social connections:     Talks on phone: Not on file     Gets together: Not on file     Attends [employer] service: Not on file     Active member of DIFFERENTIALDIAGNOSIS/MDM:   Vitals:    Vitals:    07/13/19 1101 07/13/19 1102 07/13/19 1115   BP:  (!) 191/71 (!) 142/73   Pulse: 65     Resp: 16     Temp: 98.2 °F (36.8 °C)     TempSrc: Oral     SpO2: 99%     Weight: 139 lb (63 kg)     Height: 5' 4\" (1.626 m)         MDM      CONSULTS:  None    PROCEDURES:  Unless otherwise notedbelow, none     Procedures    FINAL IMPRESSION     1.  Encounter for staple removal          DISPOSITION/PLAN   DISPOSITION Decision To Discharge 07/13/2019 11:38:28 AM      PATIENT REFERRED TO:  Josr Soria MD  [address] [place] [address]    Schedule an appointment as soon as possible for a visit   As needed      DISCHARGE MEDICATIONS:       Discharge Medication List as of 7/13/2019 11:26 AM          (Pleasenote that portions of this note were completed with a voice recognition program.  Efforts were made to edit the dictations but occasionally words are mis-transcribed.)              YOKO Ferrara  07/13/19 5520

## 2019-07-15 DIAGNOSIS — L60.0 INGROWN TOENAIL OF RIGHT FOOT: Primary | ICD-10-CM

## 2019-10-02 LAB — LYSOZYME: 2.46

## 2019-10-29 ENCOUNTER — OFFICE VISIT (OUTPATIENT)
Dept: INTERNAL MEDICINE | Age: 84
End: 2019-10-29
Payer: MEDICARE

## 2019-10-29 VITALS
DIASTOLIC BLOOD PRESSURE: 62 MMHG | BODY MASS INDEX: 23.39 KG/M2 | HEIGHT: 64 IN | HEART RATE: 58 BPM | OXYGEN SATURATION: 95 % | WEIGHT: 137 LBS | SYSTOLIC BLOOD PRESSURE: 120 MMHG

## 2019-10-29 DIAGNOSIS — N32.81 OVERACTIVE BLADDER: ICD-10-CM

## 2019-10-29 DIAGNOSIS — I10 ESSENTIAL HYPERTENSION: Primary | ICD-10-CM

## 2019-10-29 DIAGNOSIS — M15.9 PRIMARY OSTEOARTHRITIS INVOLVING MULTIPLE JOINTS: ICD-10-CM

## 2019-10-29 DIAGNOSIS — R26.81 UNSTEADY GAIT: ICD-10-CM

## 2019-10-29 DIAGNOSIS — N18.30 CHRONIC KIDNEY DISEASE, STAGE III (MODERATE) (HCC): ICD-10-CM

## 2019-10-29 PROCEDURE — 99213 OFFICE O/P EST LOW 20 MIN: CPT | Performed by: INTERNAL MEDICINE

## 2019-10-29 PROCEDURE — 1036F TOBACCO NON-USER: CPT | Performed by: INTERNAL MEDICINE

## 2019-10-29 PROCEDURE — 1090F PRES/ABSN URINE INCON ASSESS: CPT | Performed by: INTERNAL MEDICINE

## 2019-10-29 PROCEDURE — 1123F ACP DISCUSS/DSCN MKR DOCD: CPT | Performed by: INTERNAL MEDICINE

## 2019-10-29 PROCEDURE — 4040F PNEUMOC VAC/ADMIN/RCVD: CPT | Performed by: INTERNAL MEDICINE

## 2019-10-29 PROCEDURE — G8427 DOCREV CUR MEDS BY ELIG CLIN: HCPCS | Performed by: INTERNAL MEDICINE

## 2019-10-29 PROCEDURE — G8482 FLU IMMUNIZE ORDER/ADMIN: HCPCS | Performed by: INTERNAL MEDICINE

## 2019-10-29 PROCEDURE — G8420 CALC BMI NORM PARAMETERS: HCPCS | Performed by: INTERNAL MEDICINE

## 2019-11-12 ASSESSMENT — ENCOUNTER SYMPTOMS
BACK PAIN: 0
ABDOMINAL PAIN: 0
SINUS PRESSURE: 0
COUGH: 0
EYE REDNESS: 0
ABDOMINAL DISTENTION: 0
EYE DISCHARGE: 0

## 2019-11-25 RX ORDER — BISOPROLOL FUMARATE AND HYDROCHLOROTHIAZIDE 2.5; 6.25 MG/1; MG/1
1 TABLET ORAL DAILY
Qty: 90 TABLET | Refills: 3 | Status: SHIPPED | OUTPATIENT
Start: 2019-11-25 | End: 2020-11-30

## 2020-01-17 RX ORDER — SOLIFENACIN SUCCINATE 10 MG/1
TABLET, FILM COATED ORAL
Qty: 90 TABLET | Refills: 3 | Status: SHIPPED | OUTPATIENT
Start: 2020-01-17 | End: 2020-11-30

## 2020-03-06 ENCOUNTER — TELEPHONE (OUTPATIENT)
Dept: INTERNAL MEDICINE | Age: 85
End: 2020-03-06

## 2020-03-10 RX ORDER — MONTELUKAST SODIUM 10 MG/1
TABLET ORAL
Qty: 90 TABLET | Refills: 3 | Status: SHIPPED | OUTPATIENT
Start: 2020-03-10 | End: 2021-03-01

## 2020-03-24 RX ORDER — METOPROLOL TARTRATE 50 MG/1
TABLET, FILM COATED ORAL
Qty: 180 TABLET | Refills: 3 | Status: SHIPPED | OUTPATIENT
Start: 2020-03-24 | End: 2021-03-01

## 2020-05-19 RX ORDER — CELECOXIB 200 MG/1
CAPSULE ORAL
Qty: 90 CAPSULE | Refills: 3 | Status: SHIPPED | OUTPATIENT
Start: 2020-05-19 | End: 2021-04-23

## 2020-07-13 DIAGNOSIS — I10 ESSENTIAL HYPERTENSION: ICD-10-CM

## 2020-07-13 LAB
ALBUMIN SERPL-MCNC: 4.1 G/DL (ref 3.5–5.2)
ALP BLD-CCNC: 91 U/L (ref 35–104)
ALT SERPL-CCNC: 14 U/L (ref 5–33)
ANION GAP SERPL CALCULATED.3IONS-SCNC: 13 MMOL/L (ref 7–19)
AST SERPL-CCNC: 26 U/L (ref 5–32)
BILIRUB SERPL-MCNC: 0.6 MG/DL (ref 0.2–1.2)
BUN BLDV-MCNC: 30 MG/DL (ref 8–23)
CALCIUM SERPL-MCNC: 10.2 MG/DL (ref 8.2–9.6)
CHLORIDE BLD-SCNC: 100 MMOL/L (ref 98–111)
CHOLESTEROL, TOTAL: 139 MG/DL (ref 160–199)
CO2: 28 MMOL/L (ref 22–29)
CREAT SERPL-MCNC: 1.1 MG/DL (ref 0.5–0.9)
GFR NON-AFRICAN AMERICAN: 46
GLUCOSE BLD-MCNC: 96 MG/DL (ref 74–109)
HCT VFR BLD CALC: 47.2 % (ref 37–47)
HDLC SERPL-MCNC: 63 MG/DL (ref 65–121)
HEMOGLOBIN: 15.1 G/DL (ref 12–16)
LDL CHOLESTEROL CALCULATED: 60 MG/DL
MCH RBC QN AUTO: 30.8 PG (ref 27–31)
MCHC RBC AUTO-ENTMCNC: 32 G/DL (ref 33–37)
MCV RBC AUTO: 96.1 FL (ref 81–99)
PDW BLD-RTO: 13.4 % (ref 11.5–14.5)
PLATELET # BLD: 277 K/UL (ref 130–400)
PMV BLD AUTO: 10.1 FL (ref 9.4–12.3)
POTASSIUM SERPL-SCNC: 5.4 MMOL/L (ref 3.5–5)
RBC # BLD: 4.91 M/UL (ref 4.2–5.4)
SODIUM BLD-SCNC: 141 MMOL/L (ref 136–145)
TOTAL PROTEIN: 7.6 G/DL (ref 6.6–8.7)
TRIGL SERPL-MCNC: 81 MG/DL (ref 0–149)
TSH SERPL DL<=0.05 MIU/L-ACNC: 4.36 UIU/ML (ref 0.27–4.2)
WBC # BLD: 12.3 K/UL (ref 4.8–10.8)

## 2020-07-20 ENCOUNTER — OFFICE VISIT (OUTPATIENT)
Dept: INTERNAL MEDICINE | Age: 85
End: 2020-07-20
Payer: MEDICARE

## 2020-07-20 VITALS
HEART RATE: 61 BPM | SYSTOLIC BLOOD PRESSURE: 126 MMHG | HEIGHT: 64 IN | WEIGHT: 130 LBS | BODY MASS INDEX: 22.2 KG/M2 | OXYGEN SATURATION: 93 % | DIASTOLIC BLOOD PRESSURE: 64 MMHG

## 2020-07-20 PROCEDURE — 1036F TOBACCO NON-USER: CPT | Performed by: INTERNAL MEDICINE

## 2020-07-20 PROCEDURE — 99213 OFFICE O/P EST LOW 20 MIN: CPT | Performed by: INTERNAL MEDICINE

## 2020-07-20 PROCEDURE — 1090F PRES/ABSN URINE INCON ASSESS: CPT | Performed by: INTERNAL MEDICINE

## 2020-07-20 PROCEDURE — 1123F ACP DISCUSS/DSCN MKR DOCD: CPT | Performed by: INTERNAL MEDICINE

## 2020-07-20 PROCEDURE — 4040F PNEUMOC VAC/ADMIN/RCVD: CPT | Performed by: INTERNAL MEDICINE

## 2020-07-20 PROCEDURE — G0439 PPPS, SUBSEQ VISIT: HCPCS | Performed by: INTERNAL MEDICINE

## 2020-07-20 PROCEDURE — G8427 DOCREV CUR MEDS BY ELIG CLIN: HCPCS | Performed by: INTERNAL MEDICINE

## 2020-07-20 PROCEDURE — G8420 CALC BMI NORM PARAMETERS: HCPCS | Performed by: INTERNAL MEDICINE

## 2020-07-20 ASSESSMENT — PATIENT HEALTH QUESTIONNAIRE - PHQ9
SUM OF ALL RESPONSES TO PHQ QUESTIONS 1-9: 0
SUM OF ALL RESPONSES TO PHQ QUESTIONS 1-9: 2
SUM OF ALL RESPONSES TO PHQ9 QUESTIONS 1 & 2: 0
SUM OF ALL RESPONSES TO PHQ QUESTIONS 1-9: 0
SUM OF ALL RESPONSES TO PHQ QUESTIONS 1-9: 2
2. FEELING DOWN, DEPRESSED OR HOPELESS: 0
1. LITTLE INTEREST OR PLEASURE IN DOING THINGS: 0

## 2020-07-20 ASSESSMENT — LIFESTYLE VARIABLES
HOW OFTEN DURING THE LAST YEAR HAVE YOU NEEDED AN ALCOHOLIC DRINK FIRST THING IN THE MORNING TO GET YOURSELF GOING AFTER A NIGHT OF HEAVY DRINKING: 0
AUDIT TOTAL SCORE: 3
AUDIT-C TOTAL SCORE: 3
HOW OFTEN DURING THE LAST YEAR HAVE YOU BEEN UNABLE TO REMEMBER WHAT HAPPENED THE NIGHT BEFORE BECAUSE YOU HAD BEEN DRINKING: 0
HOW MANY STANDARD DRINKS CONTAINING ALCOHOL DO YOU HAVE ON A TYPICAL DAY: 0
HAVE YOU OR SOMEONE ELSE BEEN INJURED AS A RESULT OF YOUR DRINKING: 0
HAS A RELATIVE, FRIEND, DOCTOR, OR ANOTHER HEALTH PROFESSIONAL EXPRESSED CONCERN ABOUT YOUR DRINKING OR SUGGESTED YOU CUT DOWN: 0
HOW OFTEN DO YOU HAVE SIX OR MORE DRINKS ON ONE OCCASION: 0
HOW OFTEN DURING THE LAST YEAR HAVE YOU HAD A FEELING OF GUILT OR REMORSE AFTER DRINKING: 0
HOW OFTEN DURING THE LAST YEAR HAVE YOU FOUND THAT YOU WERE NOT ABLE TO STOP DRINKING ONCE YOU HAD STARTED: 0
HOW OFTEN DO YOU HAVE A DRINK CONTAINING ALCOHOL: 3
HOW OFTEN DURING THE LAST YEAR HAVE YOU FAILED TO DO WHAT WAS NORMALLY EXPECTED FROM YOU BECAUSE OF DRINKING: 0

## 2020-07-20 NOTE — PROGRESS NOTES
Chief Complaint   Patient presents with    Medicare AWV    Hypertension       HPI: Patient is here today for Medicare annual wellness visit and to follow-up medical problems including hypertension overactive bladder and other medical issues arthritis. She does amazingly well she is 80 will be 89 in 3 months she denies chest pain or abdominal pain arthralgias are stable bladder symptoms are stable no new complaints today she has some allergy symptoms but they are stable. Past Medical History:   Diagnosis Date    Essential hypertension 8/14/2017    Hypertension     Impacted cerumen of right ear 8/14/2017       Past Surgical History:   Procedure Laterality Date    APPENDECTOMY      CHOLECYSTECTOMY      HIP SURGERY Bilateral     TONSILLECTOMY         No family history on file. Social History     Socioeconomic History    Marital status:      Spouse name: Not on file    Number of children: Not on file    Years of education: Not on file    Highest education level: Not on file   Occupational History    Not on file   Social Needs    Financial resource strain: Not on file    Food insecurity     Worry: Not on file     Inability: Not on file    Transportation needs     Medical: Not on file     Non-medical: Not on file   Tobacco Use    Smoking status: Never Smoker    Smokeless tobacco: Never Used   Substance and Sexual Activity    Alcohol use:  Yes     Alcohol/week: 7.0 standard drinks     Types: 7 Glasses of wine per week    Drug use: No    Sexual activity: Never   Lifestyle    Physical activity     Days per week: Not on file     Minutes per session: Not on file    Stress: Not on file   Relationships    Social connections     Talks on phone: Not on file     Gets together: Not on file     Attends Advent service: Not on file     Active member of club or organization: Not on file     Attends meetings of clubs or organizations: Not on file     Relationship status: Not on file    Intimate partner violence     Fear of current or ex partner: Not on file     Emotionally abused: Not on file     Physically abused: Not on file     Forced sexual activity: Not on file   Other Topics Concern    Not on file   Social History Narrative    Not on file       No Known Allergies    Current Outpatient Medications   Medication Sig Dispense Refill    celecoxib (CELEBREX) 200 MG capsule TAKE 1 CAPSULE DAILY. 90 capsule 3    metoprolol tartrate (LOPRESSOR) 50 MG tablet TAKE 1 TABLET TWICE DAILY 180 tablet 3    montelukast (SINGULAIR) 10 MG tablet TAKE ONE TABLET BY MOUTH DAILY 90 tablet 3    aspirin 81 MG tablet Take 1 tablet by mouth daily 30 tablet 5    solifenacin (VESICARE) 10 MG tablet TAKE 1 TABLET BY MOUTH EVERY MORNING FOR BLADDER 90 tablet 3    bisoprolol-hydrochlorothiazide (ZIAC) 2.5-6.25 MG per tablet TAKE 1 TABLET BY MOUTH DAILY 90 tablet 3    triamcinolone (KENALOG) 0.025 % cream Apply Topically 1 Tube 0    fluticasone (FLONASE) 50 MCG/ACT nasal spray USE 2 SPRAY IN EACH NOSTRIL DAILY AS NEEDED 16 g 3    calcium carbonate (OSCAL) 500 MG TABS tablet Take 500 mg by mouth daily      saccharomyces boulardii (FLORASTOR) 250 MG capsule Take 250 mg by mouth 2 times daily      Cholecalciferol (VITAMIN D3) 2000 UNITS CAPS Take by mouth      diphenhydrAMINE-APAP, sleep, (TYLENOL PM EXTRA STRENGTH)  MG tablet Take 1 tablet by mouth nightly as needed for Sleep      melatonin 5 MG TABS tablet Take 5 mg by mouth daily       No current facility-administered medications for this visit. Review of Systems   Constitutional: Positive for fatigue. Negative for chills and fever. HENT: Positive for hearing loss. Negative for congestion and sinus pressure. Eyes: Negative for discharge and redness. Respiratory: Negative for cough. Cardiovascular: Negative for chest pain, palpitations and leg swelling. Gastrointestinal: Negative for abdominal distention and abdominal pain.    Genitourinary: Positive for frequency and urgency. Negative for dysuria. Musculoskeletal: Positive for arthralgias and gait problem. Negative for back pain. Skin: Negative for rash and wound. Neurological: Negative for dizziness, light-headedness and headaches. Psychiatric/Behavioral: Negative for dysphoric mood and sleep disturbance. The patient is not nervous/anxious. /64 (Site: Left Upper Arm)   Pulse 61   Ht 5' 4\" (1.626 m)   Wt 130 lb (59 kg)   SpO2 93%   BMI 22.31 kg/m²   BP Readings from Last 7 Encounters:   07/20/20 126/64   10/29/19 120/62   07/13/19 (!) 142/73   07/07/19 (!) 153/56   04/29/19 110/60   11/29/18 124/78   10/25/18 114/70     Wt Readings from Last 7 Encounters:   07/20/20 130 lb (59 kg)   10/29/19 137 lb (62.1 kg)   07/13/19 139 lb (63 kg)   04/29/19 139 lb (63 kg)   11/29/18 140 lb (63.5 kg)   10/25/18 139 lb (63 kg)   04/19/18 142 lb (64.4 kg)     BMI Readings from Last 7 Encounters:   07/20/20 22.31 kg/m²   10/29/19 23.52 kg/m²   07/13/19 23.86 kg/m²   04/29/19 23.86 kg/m²   11/29/18 24.03 kg/m²   10/25/18 23.86 kg/m²   04/19/18 24.37 kg/m²     Resp Readings from Last 7 Encounters:   07/13/19 16   07/07/19 15   04/19/18 18   01/28/17 18       Physical Exam  Constitutional:       General: She is not in acute distress. Appearance: Normal appearance. She is well-developed. HENT:      Right Ear: External ear normal. Tympanic membrane is not injected. Left Ear: External ear normal. Tympanic membrane is not injected. Mouth/Throat:      Pharynx: No oropharyngeal exudate. Eyes:      General: No scleral icterus. Conjunctiva/sclera: Conjunctivae normal.   Neck:      Musculoskeletal: Neck supple. Thyroid: No thyroid mass or thyromegaly. Vascular: No carotid bruit. Cardiovascular:      Rate and Rhythm: Normal rate and regular rhythm. Heart sounds: S1 normal and S2 normal. No murmur. No S3 or S4 sounds.     Pulmonary:      Effort: Pulmonary effort is normal. No respiratory distress. Breath sounds: Normal breath sounds. No wheezing or rales. Abdominal:      General: Bowel sounds are normal. There is no distension. Palpations: Abdomen is soft. There is no mass. Tenderness: There is no abdominal tenderness. Musculoskeletal:      Comments: Chronic arthritis changes trace edema   Lymphadenopathy:      Cervical: No cervical adenopathy. Upper Body:      Right upper body: No supraclavicular adenopathy. Left upper body: No supraclavicular adenopathy. Skin:     Findings: No rash. Neurological:      Mental Status: She is alert and oriented to person, place, and time. Cranial Nerves: No cranial nerve deficit.    Psychiatric:         Mood and Affect: Mood normal.         Results for orders placed or performed in visit on 07/13/20   Lipid Panel   Result Value Ref Range    Cholesterol, Total 139 (L) 160 - 199 mg/dL    Triglycerides 81 0 - 149 mg/dL    HDL 63 (L) 65 - 121 mg/dL    LDL Calculated 60 <100 mg/dL   TSH without Reflex   Result Value Ref Range    TSH 4.360 (H) 0.270 - 4.200 uIU/mL   Comprehensive Metabolic Panel   Result Value Ref Range    Sodium 141 136 - 145 mmol/L    Potassium 5.4 (H) 3.5 - 5.0 mmol/L    Chloride 100 98 - 111 mmol/L    CO2 28 22 - 29 mmol/L    Anion Gap 13 7 - 19 mmol/L    Glucose 96 74 - 109 mg/dL    BUN 30 (H) 8 - 23 mg/dL    CREATININE 1.1 (H) 0.5 - 0.9 mg/dL    GFR Non- 46 (A) >60    Calcium 10.2 (H) 8.2 - 9.6 mg/dL    Total Protein 7.6 6.6 - 8.7 g/dL    Alb 4.1 3.5 - 5.2 g/dL    Total Bilirubin 0.6 0.2 - 1.2 mg/dL    Alkaline Phosphatase 91 35 - 104 U/L    ALT 14 5 - 33 U/L    AST 26 5 - 32 U/L   CBC   Result Value Ref Range    WBC 12.3 (H) 4.8 - 10.8 K/uL    RBC 4.91 4.20 - 5.40 M/uL    Hemoglobin 15.1 12.0 - 16.0 g/dL    Hematocrit 47.2 (H) 37.0 - 47.0 %    MCV 96.1 81.0 - 99.0 fL    MCH 30.8 27.0 - 31.0 pg    MCHC 32.0 (L) 33.0 - 37.0 g/dL    RDW 13.4 11.5 - 14.5 %    Platelets 434 686 - 400 K/uL    MPV 10.1 9.4 - 12.3 fL       ASSESSMENT/ PLAN:  1. Medicare annual wellness visit, subsequent  Chart medications labs vaccines reviewed keep up-to-date with routine medical care and follow-up call with any problems or complaints    2. Essential hypertension  Blood pressure has been stable with current plan of care continue and follow    3. Chronic kidney disease, stage III (moderate) (HCC)  Stable continue with current meds quality of life better with Celebrex follow stable  - CBC; Future  - Comprehensive Metabolic Panel; Future    4. Subclinical hypothyroidism    - TSH without Reflex; Future    5. Overactive bladder  Has done well with Vesicare continue to follow    6. Primary osteoarthritis involving multiple joints  Stable    7. Unsteady gait  Uses a walker or cane good family support                    Medicare Annual Wellness Visit  Name: Tosin Avila Date: 2020   MRN: [id number] Sex: Female   Age: 80 y.o. Ethnicity: Non-/Non    : 10/14/1926 Race: Jani Garcia is here for Medicare AWV and Hypertension    Screenings for behavioral, psychosocial and functional/safety risks, and cognitive dysfunction are all negative except as indicated below. These results, as well as other patient data from the [address] form, are documented in Flowsheets linked to this Encounter. No Known Allergies    Prior to Visit Medications    Medication Sig Taking? Authorizing Provider   celecoxib (CELEBREX) 200 MG capsule TAKE 1 CAPSULE DAILY.   Santo Calabrese MD   metoprolol tartrate (LOPRESSOR) 50 MG tablet TAKE 1 TABLET TWICE DAILY  Santo Calabrese MD   montelukast (SINGULAIR) 10 MG tablet TAKE ONE TABLET BY MOUTH DAILY  Santo Calabrese MD   aspirin 81 MG tablet Take 1 tablet by mouth daily  Santo Calabrese MD   solifenacin (VESICARE) 10 MG tablet TAKE 1 TABLET BY MOUTH EVERY MORNING FOR BLADDER  Santo Calabrese MD   bisoprolol-hydrochlorothiazide (ZIAC) 2.5-6.25 MG per tablet Meningococcal (ACWY) vaccine  Aged Out     Recommendations for Preventive Services Due: see orders and patient instructions/AVS.  . Recommended screening schedule for the next 5-10 years is provided to the patient in written form: see Patient Cortes Vela was seen today for medicare awv and hypertension. Diagnoses and all orders for this visit:    Medicare annual wellness visit, subsequent    Essential hypertension    Chronic kidney disease, stage III (moderate) (HCC)  -     CBC; Future  -     Comprehensive Metabolic Panel; Future    Subclinical hypothyroidism  -     TSH without Reflex;  Future    Overactive bladder    Primary osteoarthritis involving multiple joints    Unsteady gait return to ED if symptoms worsen, persist or questions arise/need for outpatient follow-up/lab results

## 2020-07-29 ASSESSMENT — ENCOUNTER SYMPTOMS
BACK PAIN: 0
EYE REDNESS: 0
EYE DISCHARGE: 0
ABDOMINAL PAIN: 0
ABDOMINAL DISTENTION: 0
COUGH: 0
SINUS PRESSURE: 0

## 2020-07-29 NOTE — PATIENT INSTRUCTIONS
Personalized Preventive Plan for Kati Paiz - 7/20/2020  Medicare offers a range of preventive health benefits. Some of the tests and screenings are paid in full while other may be subject to a deductible, co-insurance, and/or copay. Some of these benefits include a comprehensive review of your medical history including lifestyle, illnesses that may run in your family, and various assessments and screenings as appropriate. After reviewing your medical record and screening and assessments performed today your provider may have ordered immunizations, labs, imaging, and/or referrals for you. A list of these orders (if applicable) as well as your Preventive Care list are included within your After Visit Summary for your review. Other Preventive Recommendations:    · A preventive eye exam performed by an eye specialist is recommended every 1-2 years to screen for glaucoma; cataracts, macular degeneration, and other eye disorders. · A preventive dental visit is recommended every 6 months. · Try to get at least 150 minutes of exercise per week or 10,000 steps per day on a pedometer . · Order or download the FREE \"Exercise & Physical Activity: Your Everyday Guide\" from [employer]. Call 7-264.204.6095 or search [employer] online. · You need 2870-5766 mg of calcium and 6532-7728 IU of vitamin D per day. It is possible to meet your calcium requirement with diet alone, but a vitamin D supplement is usually necessary to meet this goal.  · When exposed to the sun, use a sunscreen that protects against both UVA and UVB radiation with an SPF of 30 or greater. Reapply every 2 to 3 hours or after sweating, drying off with a towel, or swimming. · Always wear a seat belt when traveling in a car. Always wear a helmet when riding a bicycle or motorcycle.

## 2020-11-03 PROBLEM — I10 ESSENTIAL HYPERTENSION: Status: RESOLVED | Noted: 2017-08-14 | Resolved: 2020-11-03

## 2020-11-30 RX ORDER — BISOPROLOL FUMARATE AND HYDROCHLOROTHIAZIDE 2.5; 6.25 MG/1; MG/1
1 TABLET ORAL DAILY
Qty: 90 TABLET | Refills: 3 | Status: SHIPPED | OUTPATIENT
Start: 2020-11-30 | End: 2021-01-01

## 2020-11-30 RX ORDER — SOLIFENACIN SUCCINATE 10 MG/1
TABLET, FILM COATED ORAL
Qty: 90 TABLET | Refills: 3 | Status: SHIPPED | OUTPATIENT
Start: 2020-11-30 | End: 2021-01-01

## 2020-12-30 RX ORDER — FLUTICASONE PROPIONATE 50 MCG
SPRAY, SUSPENSION (ML) NASAL
Qty: 16 G | Refills: 3 | Status: SHIPPED | OUTPATIENT
Start: 2020-12-30

## 2021-01-01 ENCOUNTER — TELEPHONE (OUTPATIENT)
Dept: INTERNAL MEDICINE CLINIC | Age: 86
End: 2021-01-01

## 2021-01-01 ENCOUNTER — CARE COORDINATION (OUTPATIENT)
Dept: CASE MANAGEMENT | Age: 86
End: 2021-01-01

## 2021-01-01 ENCOUNTER — OFFICE VISIT (OUTPATIENT)
Dept: INTERNAL MEDICINE | Age: 86
End: 2021-01-01
Payer: MEDICARE

## 2021-01-01 ENCOUNTER — TELEPHONE (OUTPATIENT)
Dept: INTERNAL MEDICINE | Age: 86
End: 2021-01-01

## 2021-01-01 VITALS
BODY MASS INDEX: 21.17 KG/M2 | DIASTOLIC BLOOD PRESSURE: 74 MMHG | SYSTOLIC BLOOD PRESSURE: 126 MMHG | HEART RATE: 56 BPM | HEIGHT: 64 IN | OXYGEN SATURATION: 98 % | WEIGHT: 124 LBS

## 2021-01-01 DIAGNOSIS — Z00.00 ROUTINE GENERAL MEDICAL EXAMINATION AT A HEALTH CARE FACILITY: ICD-10-CM

## 2021-01-01 DIAGNOSIS — N32.81 OVERACTIVE BLADDER: ICD-10-CM

## 2021-01-01 DIAGNOSIS — N18.32 STAGE 3B CHRONIC KIDNEY DISEASE (HCC): ICD-10-CM

## 2021-01-01 DIAGNOSIS — I63.9 CEREBROVASCULAR ACCIDENT (CVA), UNSPECIFIED MECHANISM (HCC): ICD-10-CM

## 2021-01-01 DIAGNOSIS — R26.81 UNSTEADY GAIT: Primary | ICD-10-CM

## 2021-01-01 DIAGNOSIS — R29.6 FREQUENT FALLS: Primary | ICD-10-CM

## 2021-01-01 DIAGNOSIS — I63.9 ACUTE CEREBROVASCULAR ACCIDENT (CVA) (HCC): ICD-10-CM

## 2021-01-01 DIAGNOSIS — I10 ESSENTIAL HYPERTENSION: ICD-10-CM

## 2021-01-01 DIAGNOSIS — Z00.00 MEDICARE ANNUAL WELLNESS VISIT, SUBSEQUENT: Primary | ICD-10-CM

## 2021-01-01 DIAGNOSIS — M15.9 PRIMARY OSTEOARTHRITIS INVOLVING MULTIPLE JOINTS: ICD-10-CM

## 2021-01-01 PROCEDURE — 4040F PNEUMOC VAC/ADMIN/RCVD: CPT | Performed by: INTERNAL MEDICINE

## 2021-01-01 PROCEDURE — G0439 PPPS, SUBSEQ VISIT: HCPCS | Performed by: INTERNAL MEDICINE

## 2021-01-01 PROCEDURE — 1123F ACP DISCUSS/DSCN MKR DOCD: CPT | Performed by: INTERNAL MEDICINE

## 2021-01-01 RX ORDER — BISOPROLOL FUMARATE AND HYDROCHLOROTHIAZIDE 2.5; 6.25 MG/1; MG/1
TABLET ORAL
Qty: 90 TABLET | Refills: 3 | Status: SHIPPED | OUTPATIENT
Start: 2021-01-01 | End: 2022-01-01

## 2021-01-01 RX ORDER — METOPROLOL TARTRATE 50 MG/1
TABLET, FILM COATED ORAL
Qty: 180 TABLET | Refills: 3 | Status: SHIPPED | OUTPATIENT
Start: 2021-01-01

## 2021-01-01 RX ORDER — MONTELUKAST SODIUM 10 MG/1
TABLET ORAL
Qty: 90 TABLET | Refills: 3 | Status: SHIPPED | OUTPATIENT
Start: 2021-01-01

## 2021-01-01 ASSESSMENT — ENCOUNTER SYMPTOMS
ABDOMINAL DISTENTION: 0
BACK PAIN: 0
EYE REDNESS: 0
SINUS PRESSURE: 0
COUGH: 0
EYE DISCHARGE: 0
ABDOMINAL PAIN: 0

## 2021-01-01 ASSESSMENT — LIFESTYLE VARIABLES
HOW OFTEN DO YOU HAVE SIX OR MORE DRINKS ON ONE OCCASION: 0
HOW OFTEN DURING THE LAST YEAR HAVE YOU FAILED TO DO WHAT WAS NORMALLY EXPECTED FROM YOU BECAUSE OF DRINKING: 0
HAS A RELATIVE, FRIEND, DOCTOR, OR ANOTHER HEALTH PROFESSIONAL EXPRESSED CONCERN ABOUT YOUR DRINKING OR SUGGESTED YOU CUT DOWN: 0
AUDIT TOTAL SCORE: 1
HOW OFTEN DURING THE LAST YEAR HAVE YOU BEEN UNABLE TO REMEMBER WHAT HAPPENED THE NIGHT BEFORE BECAUSE YOU HAD BEEN DRINKING: 0
AUDIT-C TOTAL SCORE: 1
HOW MANY STANDARD DRINKS CONTAINING ALCOHOL DO YOU HAVE ON A TYPICAL DAY: 0
HOW OFTEN DO YOU HAVE A DRINK CONTAINING ALCOHOL: 1
HOW OFTEN DURING THE LAST YEAR HAVE YOU NEEDED AN ALCOHOLIC DRINK FIRST THING IN THE MORNING TO GET YOURSELF GOING AFTER A NIGHT OF HEAVY DRINKING: 0
HOW OFTEN DURING THE LAST YEAR HAVE YOU FOUND THAT YOU WERE NOT ABLE TO STOP DRINKING ONCE YOU HAD STARTED: 0
HOW OFTEN DURING THE LAST YEAR HAVE YOU HAD A FEELING OF GUILT OR REMORSE AFTER DRINKING: 0
HAVE YOU OR SOMEONE ELSE BEEN INJURED AS A RESULT OF YOUR DRINKING: 0

## 2021-01-01 ASSESSMENT — PATIENT HEALTH QUESTIONNAIRE - PHQ9
1. LITTLE INTEREST OR PLEASURE IN DOING THINGS: 0
SUM OF ALL RESPONSES TO PHQ QUESTIONS 1-9: 0
2. FEELING DOWN, DEPRESSED OR HOPELESS: 0
SUM OF ALL RESPONSES TO PHQ QUESTIONS 1-9: 0
SUM OF ALL RESPONSES TO PHQ9 QUESTIONS 1 & 2: 0
SUM OF ALL RESPONSES TO PHQ QUESTIONS 1-9: 0

## 2021-02-08 ENCOUNTER — OFFICE VISIT (OUTPATIENT)
Dept: INTERNAL MEDICINE | Age: 86
End: 2021-02-08
Payer: MEDICARE

## 2021-02-08 VITALS
WEIGHT: 129 LBS | SYSTOLIC BLOOD PRESSURE: 129 MMHG | OXYGEN SATURATION: 95 % | HEIGHT: 64 IN | HEART RATE: 58 BPM | BODY MASS INDEX: 22.02 KG/M2 | DIASTOLIC BLOOD PRESSURE: 72 MMHG

## 2021-02-08 DIAGNOSIS — N18.32 STAGE 3B CHRONIC KIDNEY DISEASE (HCC): ICD-10-CM

## 2021-02-08 DIAGNOSIS — Z91.81 AT HIGH RISK FOR FALLS: ICD-10-CM

## 2021-02-08 DIAGNOSIS — N32.81 OVERACTIVE BLADDER: ICD-10-CM

## 2021-02-08 DIAGNOSIS — M15.9 PRIMARY OSTEOARTHRITIS INVOLVING MULTIPLE JOINTS: ICD-10-CM

## 2021-02-08 DIAGNOSIS — I10 ESSENTIAL HYPERTENSION: Primary | ICD-10-CM

## 2021-02-08 PROBLEM — H04.129 TEAR FILM INSUFFICIENCY: Status: ACTIVE | Noted: 2020-03-05

## 2021-02-08 PROBLEM — Z96.1 PSEUDOPHAKIA: Status: ACTIVE | Noted: 2020-03-05

## 2021-02-08 PROCEDURE — G8427 DOCREV CUR MEDS BY ELIG CLIN: HCPCS | Performed by: INTERNAL MEDICINE

## 2021-02-08 PROCEDURE — 1090F PRES/ABSN URINE INCON ASSESS: CPT | Performed by: INTERNAL MEDICINE

## 2021-02-08 PROCEDURE — 4040F PNEUMOC VAC/ADMIN/RCVD: CPT | Performed by: INTERNAL MEDICINE

## 2021-02-08 PROCEDURE — G8420 CALC BMI NORM PARAMETERS: HCPCS | Performed by: INTERNAL MEDICINE

## 2021-02-08 PROCEDURE — 4004F PT TOBACCO SCREEN RCVD TLK: CPT | Performed by: INTERNAL MEDICINE

## 2021-02-08 PROCEDURE — G8482 FLU IMMUNIZE ORDER/ADMIN: HCPCS | Performed by: INTERNAL MEDICINE

## 2021-02-08 PROCEDURE — 1123F ACP DISCUSS/DSCN MKR DOCD: CPT | Performed by: INTERNAL MEDICINE

## 2021-02-08 PROCEDURE — 99213 OFFICE O/P EST LOW 20 MIN: CPT | Performed by: INTERNAL MEDICINE

## 2021-02-08 SDOH — ECONOMIC STABILITY: FOOD INSECURITY: WITHIN THE PAST 12 MONTHS, THE FOOD YOU BOUGHT JUST DIDN'T LAST AND YOU DIDN'T HAVE MONEY TO GET MORE.: NEVER TRUE

## 2021-02-08 SDOH — ECONOMIC STABILITY: INCOME INSECURITY: HOW HARD IS IT FOR YOU TO PAY FOR THE VERY BASICS LIKE FOOD, HOUSING, MEDICAL CARE, AND HEATING?: NOT VERY HARD

## 2021-02-08 SDOH — ECONOMIC STABILITY: FOOD INSECURITY: WITHIN THE PAST 12 MONTHS, YOU WORRIED THAT YOUR FOOD WOULD RUN OUT BEFORE YOU GOT MONEY TO BUY MORE.: NEVER TRUE

## 2021-02-08 ASSESSMENT — PATIENT HEALTH QUESTIONNAIRE - PHQ9
SUM OF ALL RESPONSES TO PHQ QUESTIONS 1-9: 0
1. LITTLE INTEREST OR PLEASURE IN DOING THINGS: 0

## 2021-02-08 NOTE — PROGRESS NOTES
Chief Complaint   Patient presents with    6 Month Follow-Up    Hypertension       HPI: Patient is here today to follow-up hypertension and other medical issues advanced age lives close to her son and her daughter lives in [facility] and is very helpful to her patient manages well at home she denies specific new complaints other than more weak than she used to be a little more difficulty getting around. No chest pressure chest pain dyspnea or abdominal pain    Past Medical History:   Diagnosis Date    Essential hypertension 8/14/2017    Hypertension     Impacted cerumen of right ear 8/14/2017       Past Surgical History:   Procedure Laterality Date    APPENDECTOMY      CHOLECYSTECTOMY      HIP SURGERY Bilateral     TONSILLECTOMY         No family history on file. Social History     Socioeconomic History    Marital status:      Spouse name: Not on file    Number of children: Not on file    Years of education: Not on file    Highest education level: Not on file   Occupational History    Not on file   Social Needs    Financial resource strain: Not very hard    Food insecurity     Worry: Never true     Inability: Never true   [employer] needs     Medical: Not on file     Non-medical: Not on file   Tobacco Use    Smoking status: Never Smoker    Smokeless tobacco: Never Used   Substance and Sexual Activity    Alcohol use:  Yes     Alcohol/week: 7.0 standard drinks     Types: 7 Glasses of wine per week    Drug use: No    Sexual activity: Never   Lifestyle    Physical activity     Days per week: Not on file     Minutes per session: Not on file    Stress: Not on file   Relationships    Social connections     Talks on phone: Not on file     Gets together: Not on file     Attends Bahai service: Not on file     Active member of club or organization: Not on file     Attends meetings of clubs or organizations: Not on file     Relationship status: Not on file    Intimate partner violence Fear of current or ex partner: Not on file     Emotionally abused: Not on file     Physically abused: Not on file     Forced sexual activity: Not on file   Other Topics Concern    Not on file   Social History Narrative    Not on file       No Known Allergies    Current Outpatient Medications   Medication Sig Dispense Refill    fluticasone (FLONASE) 50 MCG/ACT nasal spray USE 2 SPRAY IN EACH NOSTRIL DAILY AS NEEDED 16 g 3    bisoprolol-hydroCHLOROthiazide (ZIAC) 2.5-6.25 MG per tablet TAKE 1 TABLET BY MOUTH DAILY 90 tablet 3    solifenacin (VESICARE) 10 MG tablet TAKE 1 TABLET BY MOUTH EVERY MORNING FOR BLADDER 90 tablet 3    celecoxib (CELEBREX) 200 MG capsule TAKE 1 CAPSULE DAILY. 90 capsule 3    metoprolol tartrate (LOPRESSOR) 50 MG tablet TAKE 1 TABLET TWICE DAILY 180 tablet 3    montelukast (SINGULAIR) 10 MG tablet TAKE ONE TABLET BY MOUTH DAILY 90 tablet 3    aspirin 81 MG tablet Take 1 tablet by mouth daily 30 tablet 5    triamcinolone (KENALOG) 0.025 % cream Apply Topically 1 Tube 0    calcium carbonate (OSCAL) 500 MG TABS tablet Take 500 mg by mouth daily      saccharomyces boulardii (FLORASTOR) 250 MG capsule Take 250 mg by mouth 2 times daily      Cholecalciferol (VITAMIN D3) 2000 UNITS CAPS Take by mouth      diphenhydrAMINE-APAP, sleep, (TYLENOL PM EXTRA STRENGTH)  MG tablet Take 1 tablet by mouth nightly as needed for Sleep      melatonin 5 MG TABS tablet Take 5 mg by mouth daily       No current facility-administered medications for this visit.         Review of Systems    /72   Pulse 58   Ht 5' 4\" (1.626 m)   Wt 129 lb (58.5 kg)   SpO2 95%   BMI 22.14 kg/m²   BP Readings from Last 7 Encounters:   02/08/21 129/72   07/20/20 126/64   10/29/19 120/62   07/13/19 (!) 142/73   07/07/19 (!) 153/56   04/29/19 110/60   11/29/18 124/78     Wt Readings from Last 7 Encounters:   02/08/21 129 lb (58.5 kg)   07/20/20 130 lb (59 kg)   10/29/19 137 lb (62.1 kg)   07/13/19 139 lb (63 kg)   04/29/19 139 lb (63 kg)   11/29/18 140 lb (63.5 kg)   10/25/18 139 lb (63 kg)     BMI Readings from Last 7 Encounters:   02/08/21 22.14 kg/m²   07/20/20 22.31 kg/m²   10/29/19 23.52 kg/m²   07/13/19 23.86 kg/m²   04/29/19 23.86 kg/m²   11/29/18 24.03 kg/m²   10/25/18 23.86 kg/m²     Resp Readings from Last 7 Encounters:   07/13/19 16   07/07/19 15   04/19/18 18   01/28/17 18       Physical Exam  Constitutional:       General: She is not in acute distress. HENT:      Head: Normocephalic. Eyes:      General: No scleral icterus. Neck:      Musculoskeletal: Neck supple. Cardiovascular:      Heart sounds: Normal heart sounds. Pulmonary:      Breath sounds: Normal breath sounds. Musculoskeletal:      Comments: Trace edema chronic arthritis changes   Lymphadenopathy:      Cervical: No cervical adenopathy. Skin:     Findings: No rash. Neurological:      General: No focal deficit present.    Psychiatric:         Mood and Affect: Mood normal.         Results for orders placed or performed in visit on 07/13/20   Lipid Panel   Result Value Ref Range    Cholesterol, Total 139 (L) 160 - 199 mg/dL    Triglycerides 81 0 - 149 mg/dL    HDL 63 (L) 65 - 121 mg/dL    LDL Calculated 60 <100 mg/dL   TSH without Reflex   Result Value Ref Range    TSH 4.360 (H) 0.270 - 4.200 uIU/mL   Comprehensive Metabolic Panel   Result Value Ref Range    Sodium 141 136 - 145 mmol/L    Potassium 5.4 (H) 3.5 - 5.0 mmol/L    Chloride 100 98 - 111 mmol/L    CO2 28 22 - 29 mmol/L    Anion Gap 13 7 - 19 mmol/L    Glucose 96 74 - 109 mg/dL    BUN 30 (H) 8 - 23 mg/dL    CREATININE 1.1 (H) 0.5 - 0.9 mg/dL    GFR Non- 46 (A) >60    Calcium 10.2 (H) 8.2 - 9.6 mg/dL    Total Protein 7.6 6.6 - 8.7 g/dL    Albumin 4.1 3.5 - 5.2 g/dL    Total Bilirubin 0.6 0.2 - 1.2 mg/dL    Alkaline Phosphatase 91 35 - 104 U/L    ALT 14 5 - 33 U/L    AST 26 5 - 32 U/L   CBC   Result Value Ref Range    WBC 12.3 (H) 4.8 - 10.8 K/uL    RBC 4.91 4.20 -

## 2021-03-01 RX ORDER — METOPROLOL TARTRATE 50 MG/1
TABLET, FILM COATED ORAL
Qty: 180 TABLET | Refills: 3 | Status: SHIPPED | OUTPATIENT
Start: 2021-03-01 | End: 2021-01-01

## 2021-03-01 RX ORDER — MONTELUKAST SODIUM 10 MG/1
TABLET ORAL
Qty: 90 TABLET | Refills: 3 | Status: SHIPPED | OUTPATIENT
Start: 2021-03-01 | End: 2021-01-01

## 2021-03-09 DIAGNOSIS — H91.93 BILATERAL HEARING LOSS, UNSPECIFIED HEARING LOSS TYPE: Primary | ICD-10-CM

## 2021-04-23 RX ORDER — CELECOXIB 200 MG/1
CAPSULE ORAL
Qty: 90 CAPSULE | Refills: 3 | Status: SHIPPED | OUTPATIENT
Start: 2021-04-23 | End: 2021-07-27

## 2021-07-19 ENCOUNTER — APPOINTMENT (OUTPATIENT)
Dept: GENERAL RADIOLOGY | Age: 86
DRG: 065 | End: 2021-07-19
Payer: MEDICARE

## 2021-07-19 ENCOUNTER — HOSPITAL ENCOUNTER (INPATIENT)
Age: 86
LOS: 2 days | Discharge: HOME | DRG: 065 | End: 2021-07-21
Attending: EMERGENCY MEDICINE | Admitting: HOSPITALIST
Payer: MEDICARE

## 2021-07-19 ENCOUNTER — APPOINTMENT (OUTPATIENT)
Dept: CT IMAGING | Age: 86
DRG: 065 | End: 2021-07-19
Payer: MEDICARE

## 2021-07-19 DIAGNOSIS — I63.9 STROKE DETERMINED BY CLINICAL ASSESSMENT (HCC): Primary | ICD-10-CM

## 2021-07-19 DIAGNOSIS — N39.0 URINARY TRACT INFECTION WITHOUT HEMATURIA, SITE UNSPECIFIED: ICD-10-CM

## 2021-07-19 LAB
ALBUMIN SERPL-MCNC: 3.9 G/DL (ref 3.5–5.2)
ALP BLD-CCNC: 75 U/L (ref 35–104)
ALT SERPL-CCNC: 12 U/L (ref 5–33)
ANION GAP SERPL CALCULATED.3IONS-SCNC: 8 MMOL/L (ref 7–19)
AST SERPL-CCNC: 23 U/L (ref 5–32)
BACTERIA: ABNORMAL /HPF
BASOPHILS ABSOLUTE: 0.1 K/UL (ref 0–0.2)
BASOPHILS RELATIVE PERCENT: 1.3 % (ref 0–1)
BILIRUB SERPL-MCNC: 0.7 MG/DL (ref 0.2–1.2)
BILIRUBIN URINE: NEGATIVE
BLOOD, URINE: ABNORMAL
BUN BLDV-MCNC: 31 MG/DL (ref 8–23)
CALCIUM SERPL-MCNC: 9.9 MG/DL (ref 8.2–9.6)
CHLORIDE BLD-SCNC: 103 MMOL/L (ref 98–111)
CLARITY: ABNORMAL
CO2: 30 MMOL/L (ref 22–29)
COLOR: YELLOW
CREAT SERPL-MCNC: 1 MG/DL (ref 0.5–0.9)
CRYSTALS, UA: ABNORMAL /HPF
EOSINOPHILS ABSOLUTE: 0.3 K/UL (ref 0–0.6)
EOSINOPHILS RELATIVE PERCENT: 3.2 % (ref 0–5)
EPITHELIAL CELLS, UA: 0 /HPF (ref 0–5)
GFR AFRICAN AMERICAN: >59
GFR NON-AFRICAN AMERICAN: 52
GLUCOSE BLD-MCNC: 89 MG/DL (ref 74–109)
GLUCOSE URINE: NEGATIVE MG/DL
HCT VFR BLD CALC: 44.2 % (ref 37–47)
HEMOGLOBIN: 14.1 G/DL (ref 12–16)
HYALINE CASTS: 0 /HPF (ref 0–8)
IMMATURE GRANULOCYTES #: 0.1 K/UL
INR BLD: 1.06 (ref 0.88–1.18)
KETONES, URINE: NEGATIVE MG/DL
LEUKOCYTE ESTERASE, URINE: ABNORMAL
LYMPHOCYTES ABSOLUTE: 1.7 K/UL (ref 1.1–4.5)
LYMPHOCYTES RELATIVE PERCENT: 20.6 % (ref 20–40)
MCH RBC QN AUTO: 30.9 PG (ref 27–31)
MCHC RBC AUTO-ENTMCNC: 31.9 G/DL (ref 33–37)
MCV RBC AUTO: 96.7 FL (ref 81–99)
MONOCYTES ABSOLUTE: 0.9 K/UL (ref 0–0.9)
MONOCYTES RELATIVE PERCENT: 10.4 % (ref 0–10)
NEUTROPHILS ABSOLUTE: 5.2 K/UL (ref 1.5–7.5)
NEUTROPHILS RELATIVE PERCENT: 63.9 % (ref 50–65)
NITRITE, URINE: POSITIVE
PDW BLD-RTO: 13.4 % (ref 11.5–14.5)
PH UA: 7 (ref 5–8)
PLATELET # BLD: 209 K/UL (ref 130–400)
PMV BLD AUTO: 10.5 FL (ref 9.4–12.3)
POTASSIUM SERPL-SCNC: 4.8 MMOL/L (ref 3.5–5)
PROTEIN UA: NEGATIVE MG/DL
PROTHROMBIN TIME: 14 SEC (ref 12–14.6)
RBC # BLD: 4.57 M/UL (ref 4.2–5.4)
RBC UA: 4 /HPF (ref 0–4)
SARS-COV-2, NAAT: NOT DETECTED
SODIUM BLD-SCNC: 141 MMOL/L (ref 136–145)
SPECIFIC GRAVITY UA: 1.02 (ref 1–1.03)
TOTAL PROTEIN: 7.3 G/DL (ref 6.6–8.7)
TROPONIN: <0.01 NG/ML (ref 0–0.03)
UROBILINOGEN, URINE: 1 E.U./DL
WBC # BLD: 8.2 K/UL (ref 4.8–10.8)
WBC UA: 440 /HPF (ref 0–5)

## 2021-07-19 PROCEDURE — 87635 SARS-COV-2 COVID-19 AMP PRB: CPT

## 2021-07-19 PROCEDURE — 71045 X-RAY EXAM CHEST 1 VIEW: CPT

## 2021-07-19 PROCEDURE — 99223 1ST HOSP IP/OBS HIGH 75: CPT | Performed by: PSYCHIATRY & NEUROLOGY

## 2021-07-19 PROCEDURE — 84484 ASSAY OF TROPONIN QUANT: CPT

## 2021-07-19 PROCEDURE — 85025 COMPLETE CBC W/AUTO DIFF WBC: CPT

## 2021-07-19 PROCEDURE — 6360000002 HC RX W HCPCS: Performed by: HOSPITALIST

## 2021-07-19 PROCEDURE — 6360000002 HC RX W HCPCS: Performed by: EMERGENCY MEDICINE

## 2021-07-19 PROCEDURE — 81001 URINALYSIS AUTO W/SCOPE: CPT

## 2021-07-19 PROCEDURE — 93005 ELECTROCARDIOGRAM TRACING: CPT | Performed by: EMERGENCY MEDICINE

## 2021-07-19 PROCEDURE — 2580000003 HC RX 258: Performed by: EMERGENCY MEDICINE

## 2021-07-19 PROCEDURE — 6370000000 HC RX 637 (ALT 250 FOR IP): Performed by: HOSPITALIST

## 2021-07-19 PROCEDURE — 87086 URINE CULTURE/COLONY COUNT: CPT

## 2021-07-19 PROCEDURE — 87186 SC STD MICRODIL/AGAR DIL: CPT

## 2021-07-19 PROCEDURE — 85610 PROTHROMBIN TIME: CPT

## 2021-07-19 PROCEDURE — 70450 CT HEAD/BRAIN W/O DYE: CPT

## 2021-07-19 PROCEDURE — 6370000000 HC RX 637 (ALT 250 FOR IP): Performed by: PSYCHIATRY & NEUROLOGY

## 2021-07-19 PROCEDURE — 99283 EMERGENCY DEPT VISIT LOW MDM: CPT

## 2021-07-19 PROCEDURE — 93880 EXTRACRANIAL BILAT STUDY: CPT

## 2021-07-19 PROCEDURE — 36415 COLL VENOUS BLD VENIPUNCTURE: CPT

## 2021-07-19 PROCEDURE — 1210000000 HC MED SURG R&B

## 2021-07-19 PROCEDURE — 80053 COMPREHEN METABOLIC PANEL: CPT

## 2021-07-19 PROCEDURE — 2580000003 HC RX 258: Performed by: HOSPITALIST

## 2021-07-19 RX ORDER — ASPIRIN 81 MG/1
81 TABLET ORAL DAILY
Status: DISCONTINUED | OUTPATIENT
Start: 2021-07-19 | End: 2021-07-21 | Stop reason: HOSPADM

## 2021-07-19 RX ORDER — ATORVASTATIN CALCIUM 20 MG/1
20 TABLET, FILM COATED ORAL NIGHTLY
Status: DISCONTINUED | OUTPATIENT
Start: 2021-07-19 | End: 2021-07-21 | Stop reason: HOSPADM

## 2021-07-19 RX ORDER — CETIRIZINE HYDROCHLORIDE 10 MG/1
10 TABLET ORAL DAILY
Status: DISCONTINUED | OUTPATIENT
Start: 2021-07-19 | End: 2021-07-21 | Stop reason: HOSPADM

## 2021-07-19 RX ORDER — NIFEDIPINE 60 MG/1
60 TABLET, EXTENDED RELEASE ORAL 2 TIMES DAILY
Status: DISCONTINUED | OUTPATIENT
Start: 2021-07-19 | End: 2021-07-20

## 2021-07-19 RX ORDER — CALCIUM CARBONATE 500(1250)
500 TABLET ORAL DAILY
Status: DISCONTINUED | OUTPATIENT
Start: 2021-07-19 | End: 2021-07-21 | Stop reason: HOSPADM

## 2021-07-19 RX ORDER — POLYETHYLENE GLYCOL 3350 17 G/17G
17 POWDER, FOR SOLUTION ORAL DAILY PRN
Status: DISCONTINUED | OUTPATIENT
Start: 2021-07-19 | End: 2021-07-21 | Stop reason: HOSPADM

## 2021-07-19 RX ORDER — MECOBALAMIN 5000 MCG
5 TABLET,DISINTEGRATING ORAL NIGHTLY
Status: DISCONTINUED | OUTPATIENT
Start: 2021-07-19 | End: 2021-07-21 | Stop reason: HOSPADM

## 2021-07-19 RX ORDER — VITAMIN B COMPLEX
2000 TABLET ORAL DAILY
Status: DISCONTINUED | OUTPATIENT
Start: 2021-07-19 | End: 2021-07-21 | Stop reason: HOSPADM

## 2021-07-19 RX ORDER — POTASSIUM CHLORIDE 20 MEQ/1
40 TABLET, EXTENDED RELEASE ORAL PRN
Status: DISCONTINUED | OUTPATIENT
Start: 2021-07-19 | End: 2021-07-21 | Stop reason: HOSPADM

## 2021-07-19 RX ORDER — MAGNESIUM SULFATE IN WATER 40 MG/ML
2000 INJECTION, SOLUTION INTRAVENOUS PRN
Status: DISCONTINUED | OUTPATIENT
Start: 2021-07-19 | End: 2021-07-21 | Stop reason: HOSPADM

## 2021-07-19 RX ORDER — SODIUM CHLORIDE 9 MG/ML
25 INJECTION, SOLUTION INTRAVENOUS PRN
Status: DISCONTINUED | OUTPATIENT
Start: 2021-07-19 | End: 2021-07-21 | Stop reason: HOSPADM

## 2021-07-19 RX ORDER — POTASSIUM CHLORIDE 7.45 MG/ML
10 INJECTION INTRAVENOUS PRN
Status: DISCONTINUED | OUTPATIENT
Start: 2021-07-19 | End: 2021-07-21 | Stop reason: HOSPADM

## 2021-07-19 RX ORDER — ONDANSETRON 2 MG/ML
4 INJECTION INTRAMUSCULAR; INTRAVENOUS EVERY 6 HOURS PRN
Status: DISCONTINUED | OUTPATIENT
Start: 2021-07-19 | End: 2021-07-21 | Stop reason: HOSPADM

## 2021-07-19 RX ORDER — SODIUM CHLORIDE 0.9 % (FLUSH) 0.9 %
5-40 SYRINGE (ML) INJECTION PRN
Status: DISCONTINUED | OUTPATIENT
Start: 2021-07-19 | End: 2021-07-21 | Stop reason: HOSPADM

## 2021-07-19 RX ORDER — TROSPIUM CHLORIDE 20 MG/1
20 TABLET, FILM COATED ORAL
Status: DISCONTINUED | OUTPATIENT
Start: 2021-07-20 | End: 2021-07-21 | Stop reason: HOSPADM

## 2021-07-19 RX ORDER — CLOPIDOGREL BISULFATE 75 MG/1
75 TABLET ORAL DAILY
Status: DISCONTINUED | OUTPATIENT
Start: 2021-07-20 | End: 2021-07-21 | Stop reason: HOSPADM

## 2021-07-19 RX ORDER — CALCIUM CARBONATE 500(1250)
500 TABLET ORAL DAILY
Status: DISCONTINUED | OUTPATIENT
Start: 2021-07-19 | End: 2021-07-19 | Stop reason: SDUPTHER

## 2021-07-19 RX ORDER — METOPROLOL TARTRATE 50 MG/1
50 TABLET, FILM COATED ORAL 2 TIMES DAILY
Status: DISCONTINUED | OUTPATIENT
Start: 2021-07-19 | End: 2021-07-21 | Stop reason: HOSPADM

## 2021-07-19 RX ORDER — CETIRIZINE HYDROCHLORIDE 10 MG/1
10 TABLET ORAL DAILY
COMMUNITY
End: 2021-01-01

## 2021-07-19 RX ORDER — SODIUM CHLORIDE 0.9 % (FLUSH) 0.9 %
5-40 SYRINGE (ML) INJECTION EVERY 12 HOURS SCHEDULED
Status: DISCONTINUED | OUTPATIENT
Start: 2021-07-19 | End: 2021-07-21 | Stop reason: HOSPADM

## 2021-07-19 RX ORDER — MONTELUKAST SODIUM 10 MG/1
10 TABLET ORAL NIGHTLY
Status: DISCONTINUED | OUTPATIENT
Start: 2021-07-19 | End: 2021-07-21 | Stop reason: HOSPADM

## 2021-07-19 RX ORDER — ONDANSETRON 4 MG/1
4 TABLET, ORALLY DISINTEGRATING ORAL EVERY 8 HOURS PRN
Status: DISCONTINUED | OUTPATIENT
Start: 2021-07-19 | End: 2021-07-21 | Stop reason: HOSPADM

## 2021-07-19 RX ORDER — ACETAMINOPHEN 650 MG/1
650 SUPPOSITORY RECTAL EVERY 6 HOURS PRN
Status: DISCONTINUED | OUTPATIENT
Start: 2021-07-19 | End: 2021-07-21 | Stop reason: HOSPADM

## 2021-07-19 RX ORDER — ACETAMINOPHEN 325 MG/1
650 TABLET ORAL EVERY 6 HOURS PRN
Status: DISCONTINUED | OUTPATIENT
Start: 2021-07-19 | End: 2021-07-21 | Stop reason: HOSPADM

## 2021-07-19 RX ADMIN — CETIRIZINE HYDROCHLORIDE 10 MG: 10 TABLET, FILM COATED ORAL at 21:07

## 2021-07-19 RX ADMIN — SODIUM CHLORIDE, PRESERVATIVE FREE 10 ML: 5 INJECTION INTRAVENOUS at 21:10

## 2021-07-19 RX ADMIN — CALCIUM 500 MG: 500 TABLET ORAL at 21:07

## 2021-07-19 RX ADMIN — METOPROLOL TARTRATE 50 MG: 50 TABLET, FILM COATED ORAL at 21:07

## 2021-07-19 RX ADMIN — ASPIRIN 81 MG: 81 TABLET, COATED ORAL at 17:41

## 2021-07-19 RX ADMIN — ATORVASTATIN CALCIUM 20 MG: 20 TABLET, FILM COATED ORAL at 21:07

## 2021-07-19 RX ADMIN — CEFTRIAXONE 1000 MG: 1 INJECTION, POWDER, FOR SOLUTION INTRAMUSCULAR; INTRAVENOUS at 14:11

## 2021-07-19 RX ADMIN — ENOXAPARIN SODIUM 30 MG: 30 INJECTION SUBCUTANEOUS at 21:06

## 2021-07-19 RX ADMIN — Medication 2000 UNITS: at 21:07

## 2021-07-19 RX ADMIN — MONTELUKAST 10 MG: 10 TABLET, FILM COATED ORAL at 21:07

## 2021-07-19 RX ADMIN — Medication 5 MG: at 21:07

## 2021-07-19 RX ADMIN — NIFEDIPINE 60 MG: 60 TABLET, EXTENDED RELEASE ORAL at 21:07

## 2021-07-19 ASSESSMENT — ENCOUNTER SYMPTOMS
COUGH: 0
ABDOMINAL PAIN: 0
SHORTNESS OF BREATH: 0
BACK PAIN: 0

## 2021-07-19 NOTE — CONSULTS
[facility]  [address] [place] [address] [address], [address]  [place], [place]  Phone [phone]     Neurology Consultation     Date of Admission: 2021 10:48 AM  Date of Consultation: 21    Attending Provider: Chantale Westfall MD  Consulting Provider: Vipul Whitten M.D. Patient: Rishi Noriega  :  10/14/1926  Age:  80 y.o. MRN:  [id number]    CHIEF COMPLAINT:  Stroke    History Source: History obtained from chart review and the patient. PCP: Oralia Bower MD    HISTORY OF PRESENT ILLNESS:   Rishi Noriega is a 80y.o. year old woman with a history of hypertension who was admitted today with dysarthria. For weeks, she has had some intermittent slurred speech. It does not seem to last very long. She does take an 81 mg ASA daily. This morning her speech was worse and was not improving. She has had no weakness, numbness, vertigo, dysphagia, facial weakness. PAST MEDICAL HISTORY:    Medical History:      Diagnosis Date    Arthritis     Cerebral artery occlusion with cerebral infarction [facility])     possible this admit     Chronic kidney disease     Essential hypertension 2017    Hypertension     Impacted cerumen of right ear 2017    Movement disorder        Surgical History:      Procedure Laterality Date    APPENDECTOMY      BREAST BIOPSY Bilateral     CHOLECYSTECTOMY      HIP SURGERY Bilateral     TONSILLECTOMY         Medications Prior to Admission:    Prior to Admission medications    Medication Sig Start Date End Date Taking?  Authorizing Provider   cetirizine (ZYRTEC) 10 MG tablet Take 10 mg by mouth daily   Yes Historical Provider, MD   celecoxib (CELEBREX) 200 MG capsule TAKE 1 CAPSULE BY MOUTH DAILY 21   Oralia Bower MD   metoprolol tartrate (LOPRESSOR) 50 MG tablet TAKE 1 TABLET TWICE DAILY 3/1/21   Oralia Bower MD   montelukast (SINGULAIR) 10 MG tablet TAKE ONE TABLET BY MOUTH DAILY  Patient taking differently: nightly TAKE ONE TABLET BY MOUTH DAILY 3/1/21 Meet Galeano MD   fluticasone (FLONASE) 50 MCG/ACT nasal spray USE 2 SPRAY IN EACH NOSTRIL DAILY AS NEEDED 12/30/20   Meet Galeano MD   bisoprolol-hydroCHLOROthiazide (ZIAC) 2.5-6.25 MG per tablet TAKE 1 TABLET BY MOUTH DAILY 11/30/20   Meet Galeano MD   solifenacin (VESICARE) 10 MG tablet TAKE 1 TABLET BY MOUTH EVERY MORNING FOR BLADDER 11/30/20   Meet Galeano MD   aspirin 81 MG tablet Take 1 tablet by mouth daily 3/9/20   Meet Galeano MD   calcium carbonate (OSCAL) 500 MG TABS tablet Take 500 mg by mouth daily    Historical Provider, MD   saccharomyces boulardii (FLORASTOR) 250 MG capsule Take 250 mg by mouth 2 times daily    Historical Provider, MD   Cholecalciferol (VITAMIN D3) 2000 UNITS CAPS Take by mouth    Historical Provider, MD   diphenhydrAMINE-APAP, sleep, (TYLENOL PM EXTRA STRENGTH)  MG tablet Take 1 tablet by mouth nightly as needed for Sleep    Historical Provider, MD   melatonin 5 MG TABS tablet Take 5 mg by mouth nightly     Historical Provider, MD       Current Medications:  No current facility-administered medications for this encounter. Allergies:  Patient has no known allergies. Habits:  TOBACCO:   reports that she has never smoked. She has never used smokeless tobacco.  ETOH:   reports current alcohol use of about 7.0 standard drinks of alcohol per week. DRUGS:    Social History     Substance and Sexual Activity   Drug Use No       SOCIAL HISTORY:   Aries Brown is , lives in [place], [place], and is retired. She resides alone but her son resides next door. FAMILY HISTORY:   No family history on file. REVIEW OF SYSTEMS:  Review of Systems   Constitutional: Negative for chills and fever. HENT: Positive for hearing loss. Negative for tinnitus. Eyes: Negative for photophobia and visual disturbance. Respiratory: Negative for cough and shortness of breath. Cardiovascular: Negative for chest pain and palpitations.    Gastrointestinal: Negative for nausea and vomiting. Endocrine: Negative for polydipsia and polyuria. Genitourinary: Positive for frequency and urgency. Musculoskeletal: Positive for arthralgias. Negative for back pain. Skin: Negative for rash. Allergic/Immunologic: Negative for environmental allergies and food allergies. Neurological: Positive for speech difficulty. Negative for dizziness, tremors, seizures, syncope, facial asymmetry, weakness, light-headedness, numbness and headaches. Hematological: Negative for adenopathy. Does not bruise/bleed easily. Psychiatric/Behavioral: Negative for dysphoric mood. The patient is not nervous/anxious. PHYSICAL EXAMINATION:  Vitals: BP (!) 183/76   Pulse 54   Temp 97 °F (36.1 °C) (Temporal)   Resp 16   Ht 5' 4\" (1.626 m)   Wt 126 lb (57.2 kg)   SpO2 91%   BMI 21.63 kg/m²   General appearance: alert, appears stated age and cooperative  Skin: Skin color, texture, turgor normal. No rashes or lesions  HEENT: Head: Normal, normocephalic, atraumatic. Neck:no adenopathy, no carotid bruit, no JVD, supple, symmetrical, trachea midline and thyroid not enlarged, symmetric, no tenderness/mass/nodules  Lungs: clear to auscultation bilaterally  Heart: regular rate and rhythm, S1, S2 normal, no murmur, click, rub or gallop  Abdomen: soft, non-tender; bowel sounds normal; no masses,  no organomegaly  Extremities: extremities normal, atraumatic, no cyanosis or edema      NEUROLOGIC EXAMINATION:  Neurologic Exam     Mental Status   Oriented to person, place, and time. Speech: slurred   Level of consciousness: alert  Able to name object. Able to repeat. Speech is fluent but she has significant dysarthria. Cranial Nerves     CN II   Visual fields full to confrontation. CN III, IV, VI   Pupils are equal, round, and reactive to light. Extraocular motions are normal.     CN VII   Facial expression full, symmetric.      CN VIII   Hearing: intact    CN IX, X   Palate: symmetric    CN XI   Right sternocleidomastoid strength: normal  Left sternocleidomastoid strength: normal  Right trapezius strength: normal  Left trapezius strength: normal    CN XII   Tongue: not atrophic  Fasciculations: absent  Tongue deviation: none    Motor Exam   Muscle bulk: normal  Overall muscle tone: normal  Right arm pronator drift: absent  Left arm pronator drift: absent    Strength   Right neck flexion: 5/5  Left neck flexion: 5/5  Right neck extension: 5/5  Left neck extension: 5/5  Right deltoid: 5/5  Left deltoid: 5/5  Right biceps: 5/5  Left biceps: 5/5  Right triceps: 5/5  Left triceps: 5/5  Right wrist flexion: 5/5  Left wrist flexion: 5/5  Right wrist extension: 5/5  Left wrist extension: 5/5  Right interossei: 5/5  Left interossei: 5/5  Right iliopsoas: 5/5  Left iliopsoas: 5/5  Right quadriceps: 5/5  Left quadriceps: 5/5  Right glutei: 5/5  Left glutei: 5/5  Right anterior tibial: 5/5  Left anterior tibial: 5/5  Right posterior tibial: 5/5  Left posterior tibial: 5/5  Right peroneal: 5/5  Left peroneal: 5/5  Right gastroc: 5/5  Left gastroc: 5/5    Sensory Exam   Light touch normal.   Right arm vibration: normal  Left arm vibration: normal  Right leg vibration: decreased from knee  Left leg vibration: decreased from knee  Right arm pinprick: normal  Left arm pinprick: normal  Right leg pinprick: decreased from knee  Left leg pinprick: decreased from knee    Gait, Coordination, and Reflexes     Coordination   Finger to nose coordination: normal    Tremor   Resting tremor: absent  Intention tremor: absent  Action tremor: absent    Reflexes   Right brachioradialis: 0  Left brachioradialis: 0  Right biceps: 0  Left biceps: 0  Right triceps: 0  Left triceps: 0  Right patellar: 0  Left patellar: 0  Right achilles: 0  Left achilles: 0  Right plantar: normal  Left plantar: normal  Right Gonzalez: absent  Left Gonzalez: absent  Right ankle clonus: absent  Left ankle clonus: absent  Rapid alternating movements were normal. ADDITIONAL REVIEW:  CBC:    Recent Labs     07/19/21  1115   WBC 8.2   HGB 14.1        BMP:     Recent Labs     07/19/21  1115      K 4.8      CO2 30*   BUN 31*   CREATININE 1.0*   GLUCOSE 89     Hepatic:  Recent Labs     07/19/21  1115   AST 23   ALT 12   BILITOT 0.7   ALKPHOS 75     Troponin T:    Recent Labs     07/19/21  1115   TROPONINI <0.01     INR:    Recent Labs     07/19/21  1115   INR 1.06     Narrative   EXAMINATION: CT HEAD WO CONTRAST 7/19/2021 1:33 PM   HISTORY: Stroke symptoms, difficulty with speech. Listing to the   right. DOSE: 708 mGycm. Automatic exposure control was utilized in an effort   to use as little radiation as possible, without compromising image   quality. REPORT: Spiral CT of the head was performed without contrast,   reconstructed coronal and sagittal images are also reviewed. COMPARISON: CT head without contrast 7/7/2019. No intracranial hemorrhage, mass or mass effect is identified. There   is diffuse cerebral atrophy. There is mild dilation of the ventricular   system. This is stable. There is mild to moderate decreased   attenuation in the periventricular and subcortical white matter tracts   compatible chronic small vessel white matter ischemic disease. This is   stable. There is a small round chronic lacunar infarct in the right   cerebellar hemisphere. This is stable. Review of bone windows is   negative for fractures. There is normal aeration of the visualized   paranasal sinuses and mastoid air cells.       Impression   1. No intracranial hemorrhage or acute findings. MRI is more sensitive   for acute stroke. 2. Diffuse atrophy and chronic small vessel white matter ischemic   changes. 3. Probable small sub-5 mm focal chronic lacunar infarct in the right   cerebellum, stable. Signed by Dr Sal Penaloza. Mary Grace         IMPRESSION:  1. Stroke. Dysarthria seems to be her only symptom.   She has had some balance impairment and falls likely just from age. 2. Hypertension      PLAN:  1. NICS  2. Echo  3. ST consultation  4. Continue ASA. Consider increasing to 325 mg daily or adding Plavix.     Jorge A Mullins M.D.

## 2021-07-19 NOTE — ED PROVIDER NOTES
[facility] 4 ONCOLOGY UNIT  eMERGENCY dEPARTMENT eNCOUnter      Pt Name: Legrand Sicard  MRN: [id number]  [place] 10/14/1926  Date of evaluation: 7/19/2021  Provider: Jacque Fernandes MD    CHIEF COMPLAINT       Chief Complaint   Patient presents with    Transient Ischemic Attack     LKW was wednesday, right side weakness         HISTORY OF PRESENT ILLNESS   (Location/Symptom, Timing/Onset,Context/Setting, Quality, Duration, Modifying Factors, Severity)  Note limiting factors. Legrand Sicard is a 80 y.o. female who presents to the emergency department with trouble speaking and in [place] on [address] [facility] \"I kept listing over to the RIGHT. \"    Lives in a house in [facility]. Near family.    - HA    - Syncope    - CP, SOB. Hard to form sentence and get words out \"about a month getting worse. \"    No anticoag except ASA 81 mg and BP meds really. Thick tongue per daughter seems worse. This started sometime this weekend likely Sat or prior. Hasn't felt well x 10 days. Walking has been ok, family noticed WED last week speech granddaughter says is off, son noticed change as of SAT and SUN with speech. The history is provided by the patient and a relative. NursingNotes were reviewed. REVIEW OF SYSTEMS    (2-9 systems for level 4, 10 or more for level 5)     Review of Systems   Constitutional: Negative for fever. Respiratory: Negative for cough and shortness of breath. Gastrointestinal: Negative for abdominal pain. Musculoskeletal: Negative for back pain and neck pain. Neurological: Positive for speech difficulty. A complete review of systems was performed and is negative except as noted above in the HPI.        PAST MEDICAL HISTORY     Past Medical History:   Diagnosis Date    Arthritis     Cerebral artery occlusion with cerebral infarction [facility])     possible this admit     Chronic kidney disease     Essential hypertension 8/14/2017    Hypertension     Impacted cerumen of right ear 8/14/2017  Movement disorder          SURGICAL HISTORY       Past Surgical History:   Procedure Laterality Date    APPENDECTOMY      BREAST BIOPSY Bilateral     CHOLECYSTECTOMY      HIP SURGERY Bilateral     TONSILLECTOMY           CURRENT MEDICATIONS       Current Discharge Medication List      CONTINUE these medications which have NOT CHANGED    Details   cetirizine (ZYRTEC) 10 MG tablet Take 10 mg by mouth daily      celecoxib (CELEBREX) 200 MG capsule TAKE 1 CAPSULE BY MOUTH DAILY  Qty: 90 capsule, Refills: 3      metoprolol tartrate (LOPRESSOR) 50 MG tablet TAKE 1 TABLET TWICE DAILY  Qty: 180 tablet, Refills: 3      montelukast (SINGULAIR) 10 MG tablet TAKE ONE TABLET BY MOUTH DAILY  Qty: 90 tablet, Refills: 3      fluticasone (FLONASE) 50 MCG/ACT nasal spray USE 2 SPRAY IN EACH NOSTRIL DAILY AS NEEDED  Qty: 16 g, Refills: 3      bisoprolol-hydroCHLOROthiazide (ZIAC) 2.5-6.25 MG per tablet TAKE 1 TABLET BY MOUTH DAILY  Qty: 90 tablet, Refills: 3      solifenacin (VESICARE) 10 MG tablet TAKE 1 TABLET BY MOUTH EVERY MORNING FOR BLADDER  Qty: 90 tablet, Refills: 3      aspirin 81 MG tablet Take 1 tablet by mouth daily  Qty: 30 tablet, Refills: 5      calcium carbonate (OSCAL) 500 MG TABS tablet Take 500 mg by mouth daily      saccharomyces boulardii (FLORASTOR) 250 MG capsule Take 250 mg by mouth 2 times daily      Cholecalciferol (VITAMIN D3) 2000 UNITS CAPS Take by mouth      diphenhydrAMINE-APAP, sleep, (TYLENOL PM EXTRA STRENGTH)  MG tablet Take 1 tablet by mouth nightly as needed for Sleep      melatonin 5 MG TABS tablet Take 5 mg by mouth nightly              ALLERGIES     Patient has no known allergies. FAMILY HISTORY     No family history on file. SOCIAL HISTORY       Social History     Socioeconomic History    Marital status:       Spouse name: Not on file    Number of children: Not on file    Years of education: Not on file    Highest education level: Not on file   Occupational History    Not on file   Tobacco Use    Smoking status: Never Smoker    Smokeless tobacco: Never Used   Substance and Sexual Activity    Alcohol use: Yes     Alcohol/week: 7.0 standard drinks     Types: 7 Glasses of wine per week    Drug use: No    Sexual activity: Never   Other Topics Concern    Not on file   Social History Narrative    Not on file     Social Determinants of Health     Financial Resource Strain: Low Risk     Difficulty of Paying Living Expenses: Not very hard   Food Insecurity: No Food Insecurity    Worried About Running Out of Food in the Last Year: Never true    Otilio of Food in the Last Year: Never true   Transportation Needs:     Lack of Transportation (Medical):  Lack of Transportation (Non-Medical):    Physical Activity:     Days of Exercise per Week:     Minutes of Exercise per Session:    Stress:     Feeling of Stress :    Social Connections:     Frequency of Communication with Friends and Family:     Frequency of Social Gatherings with Friends and Family:     Attends Islam Services:     Active Member of Clubs or Organizations:     Attends Club or Organization Meetings:     Marital Status:    Intimate Partner Violence:     Fear of Current or Ex-Partner:     Emotionally Abused:     Physically Abused:     Sexually Abused:        SCREENINGS   NIH Stroke Scale  Interval: Baseline  Level of Consciousness (1a. ): Alert  LOC Questions (1b. ):  Answers both correctly  LOC Commands (1c. ): Performs both tasks correctly  Best Gaze (2. ): Normal  Visual (3. ): No visual loss  Facial Palsy (4. ): Normal symmetrical movement  Motor Arm, Left (5a. ): No drift  Motor Arm, Right (5b. ): No drift  Motor Leg, Left (6a. ): No drift  Motor Leg, Right (6b. ): No drift  Limb Ataxia (7. ): Absent  Sensory (8. ): Normal  Best Language (9. ): No aphasia  Dysarthria (10. ): Mild to moderate, slurs some words  Extinction and Inattention (11): No abnormality  Total: 1         PHYSICAL EXAM    (up to 7 for level 4, 8 or more for level 5)     ED Triage Vitals   BP Temp Temp Source Pulse Resp SpO2 Height Weight   07/19/21 1053 07/19/21 1050 07/19/21 1050 07/19/21 1050 07/19/21 1050 07/19/21 1050 07/19/21 1050 07/19/21 1050   (!) 183/70 97.9 °F (36.6 °C) Temporal 62 16 97 % 5' 4\" (1.626 m) 126 lb (57.2 kg)       Physical Exam  Vitals and nursing note reviewed. Constitutional:       General: She is not in acute distress. Appearance: Normal appearance. She is not ill-appearing. Comments: Very well-appearing for 80years old. Sitting up in bed well dressed and smells very pleasant with her hand lotion   HENT:      Head: Normocephalic and atraumatic. Eyes:      Extraocular Movements: Extraocular movements intact. Pupils: Pupils are equal, round, and reactive to light. Cardiovascular:      Rate and Rhythm: Normal rate and regular rhythm. Heart sounds: Murmur heard. Pulmonary:      Effort: Pulmonary effort is normal. No respiratory distress. Abdominal:      General: Abdomen is flat. There is no distension. Palpations: Abdomen is soft. Tenderness: There is no abdominal tenderness. Musculoskeletal:         General: No tenderness. Normal range of motion. Cervical back: Normal range of motion and neck supple. Skin:     General: Skin is warm and dry. Neurological:      Mental Status: She is alert and oriented to person, place, and time. GCS: GCS eye subscore is 4. GCS verbal subscore is 5. GCS motor subscore is 6. Comments: Patient with some mild trouble speaking \"thick tongue. \"  Per family. There is some sort of objective finding where she tries to speak is just a little off she still able to have a fluid conversation at times it might be a little choppy or not come out perfectly. However she still is very understandable. No obvious weakness in her legs or her arms. No obvious sensory deficit.    Psychiatric:         Attention and Perception: Attention normal.         Mood and Affect: Mood normal.         Behavior: Behavior normal.         Thought Content: Thought content normal.      Comments: Delightful personality         DIAGNOSTIC RESULTS     EKG: All EKG's are interpreted by the Emergency Department Physician who either signs or Co-signs this chart in the absence of a cardiologist.    EKG sinus 57 artifact  ms    RADIOLOGY:   Non-plain film images such as CT, Ultrasound and MRI are read by the radiologist. Plainradiographic images are visualized and preliminarily interpreted by the emergency physician with the below findings:        Interpretation per the Radiologist below, if available at the time of this note:    CT HEAD WO CONTRAST   Final Result   1. No intracranial hemorrhage or acute findings. MRI is more sensitive   for acute stroke. 2. Diffuse atrophy and chronic small vessel white matter ischemic   changes. 3. Probable small sub-5 mm focal chronic lacunar infarct in the right   cerebellum, stable. Signed by Dr Yomaira Farris. [place]      XR CHEST PORTABLE   Final Result   1.. Increased interstitial markings with scarring or atelectasis in   the lung bases. There is some more confluent disease within the right   lung including the mid and lower lung zone perhaps representing a   superimposed infiltrate.    Signed by Dr Christin Jalloh            ED BEDSIDE ULTRASOUND:   Performed by ED Physician - none    LABS:  Labs Reviewed   COMPREHENSIVE METABOLIC PANEL - Abnormal; Notable for the following components:       Result Value    CO2 30 (*)     BUN 31 (*)     CREATININE 1.0 (*)     GFR Non-African American 52 (*)     Calcium 9.9 (*)     All other components within normal limits   CBC WITH AUTO DIFFERENTIAL - Abnormal; Notable for the following components:    MCHC 31.9 (*)     Monocytes % 10.4 (*)     Basophils % 1.3 (*)     All other components within normal limits   URINE RT REFLEX TO CULTURE - Abnormal; Notable for the following components:    Clarity, UA CLOUDY (*)     Blood, Urine TRACE (*)     Nitrite, Urine POSITIVE (*)     Leukocyte Esterase, Urine LARGE (*)     All other components within normal limits   MICROSCOPIC URINALYSIS - Abnormal; Notable for the following components:    Bacteria, UA 4+ (*)     Crystals, UA NEG (*)     WBC,  (*)     All other components within normal limits   COVID-19, RAPID   CULTURE, URINE   PROTIME-INR   TROPONIN   POCT GLUCOSE       All other labs were within normal range or not returned as of this dictation. EMERGENCY DEPARTMENT COURSE and DIFFERENTIALDIAGNOSIS/MDM:   Vitals:    Vitals:    07/19/21 1050 07/19/21 1053 07/19/21 1413 07/19/21 1523   BP:  (!) 183/70 (!) 180/80 (!) 183/76   Pulse: 62  61 54   Resp: 16  14 16   Temp: 97.9 °F (36.6 °C)   97 °F (36.1 °C)   TempSrc: Temporal   Temporal   SpO2: 97%  97% 91%   Weight: 126 lb (57.2 kg)      Height: 5' 4\" (1.626 m)          MDM  Number of Diagnoses or Management Options  Stroke determined by clinical assessment [facility]): new and requires workup  Urinary tract infection without hematuria, site unspecified: new and requires workup  Diagnosis management comments: Patient with clinically a stroke. She has had a stroke based on my assessment. While the symptoms may be mild will admit her given that she lives alone she also has a UTI treat that. Discussed with neurology to further evaluate the patient. Hopefully she can go home tomorrow after further assessment with carotid and her echo assessment she does have a murmur on my exam of note. Start antibiotics. She takes a baby aspirin already family in agreement with the plan. As above.        Amount and/or Complexity of Data Reviewed  Clinical lab tests: ordered and reviewed  Tests in the radiology section of CPT®: ordered and reviewed  Obtain history from someone other than the patient: yes  Discuss the patient with other providers: yes  Independent visualization of images, tracings, or specimens: yes    Risk of Complications, Morbidity, and/or Mortality  Presenting problems: high    Patient Progress  Patient progress: stable        CONSULTS:  IP CONSULT TO NEUROLOGY    PROCEDURES:  Unless otherwise notedbelow, none     Procedures    FINAL IMPRESSION     1. Stroke determined by clinical assessment ([facility] [address]    2. Urinary tract infection without hematuria, site unspecified          DISPOSITION/PLAN   DISPOSITION Admitted 07/19/2021 01:44:53 PM      PATIENT REFERRED TO:  No follow-up provider specified.     DISCHARGE MEDICATIONS:  Current Discharge Medication List             (Please note that portions of this note were completed with a voice recognition program.  Efforts were made to edit the dictations butoccasionally words are mis-transcribed.)    Apolinar Brown MD (electronically signed)  AttendingEmergency Physician         Apolinar Brown MD  07/19/21 [id number]

## 2021-07-19 NOTE — H&P
HISTORY AND PHYSICAL             Date: 7/19/2021        Patient Name: Low Langford     YOB: 1926      Age:  80 y.o. Chief Complaint     Chief Complaint   Patient presents with    Transient Ischemic Attack     LKW was wednesday, right side weakness       History Obtained From   patient    History of Present Illness     80year-old lady with history of arthritis, chronic kidney disease stage II, hypertension, movement disorder, presents to the hospital with concerns of right-sided weakness and aphasia. Per patient family has noticed that her speech has been a little bit of last couple of days, while in [employer] on Sunday noticed that she was leaning towards the right side and every time she propped herself back up she tends to lean back forward to the right. States that due to persistence of symptoms presented to the emergency room for evaluation. Patient stated about a year ago she had similar issue but also she could recall. In the ED blood pressure was noted to be elevated in the 083 systolic over 92C, labs unremarkable, troponin negative, CT head with chronic lacunar infarct in the right cerebellum. Patient was also noted to have urinary tract infection which was given ceftriaxone. Was admitted to the hospital for stroke work-up. Past Medical History     Past Medical History:   Diagnosis Date    Arthritis     Cerebral artery occlusion with cerebral infarction [facility])     possible this admit     Chronic kidney disease     Essential hypertension 8/14/2017    Hypertension     Impacted cerumen of right ear 8/14/2017    Movement disorder         Past Surgical History     Past Surgical History:   Procedure Laterality Date    APPENDECTOMY      BREAST BIOPSY Bilateral     CHOLECYSTECTOMY      HIP SURGERY Bilateral     TONSILLECTOMY          Medications Prior to Admission     Prior to Admission medications    Medication Sig Start Date End Date Taking?  Authorizing Provider cetirizine (ZYRTEC) 10 MG tablet Take 10 mg by mouth daily   Yes Historical Provider, MD   celecoxib (CELEBREX) 200 MG capsule TAKE 1 CAPSULE BY MOUTH DAILY 4/23/21   Kya Rosado MD   metoprolol tartrate (LOPRESSOR) 50 MG tablet TAKE 1 TABLET TWICE DAILY 3/1/21   Kya Rosado MD   montelukast (SINGULAIR) 10 MG tablet TAKE ONE TABLET BY MOUTH DAILY  Patient taking differently: nightly TAKE ONE TABLET BY MOUTH DAILY 3/1/21   Kya Rosado MD   fluticasone (FLONASE) 50 MCG/ACT nasal spray USE 2 SPRAY IN EACH NOSTRIL DAILY AS NEEDED 12/30/20   Kya Rosado MD   bisoprolol-hydroCHLOROthiazide (ZIAC) 2.5-6.25 MG per tablet TAKE 1 TABLET BY MOUTH DAILY 11/30/20   Kya Rosado MD   solifenacin (VESICARE) 10 MG tablet TAKE 1 TABLET BY MOUTH EVERY MORNING FOR BLADDER 11/30/20   Kya Rosado MD   aspirin 81 MG tablet Take 1 tablet by mouth daily 3/9/20   Kya Rosado MD   calcium carbonate (OSCAL) 500 MG TABS tablet Take 500 mg by mouth daily    Historical Provider, MD   saccharomyces boulardii (FLORASTOR) 250 MG capsule Take 250 mg by mouth 2 times daily    Historical Provider, MD   Cholecalciferol (VITAMIN D3) 2000 UNITS CAPS Take by mouth    Historical Provider, MD   diphenhydrAMINE-APAP, sleep, (TYLENOL PM EXTRA STRENGTH)  MG tablet Take 1 tablet by mouth nightly as needed for Sleep    Historical Provider, MD   melatonin 5 MG TABS tablet Take 5 mg by mouth nightly     Historical Provider, MD        Allergies   Patient has no known allergies. Social History     Social History     Tobacco History     Smoking Status  Never Smoker    Smokeless Tobacco Use  Never Used          Alcohol History     Alcohol Use Status  Yes Drinks/Week  7 Glasses of wine per week Amount  7.0 standard drinks of alcohol/wk          Drug Use     Drug Use Status  No          Sexual Activity     Sexually Active  Never                Family History   No family history on file.     Review of Systems   Review of Systems: 16 point system reviewed and negative suggested above. Physical Exam   BP (!) 183/76   Pulse 54   Temp 97 °F (36.1 °C) (Temporal)   Resp 16   Ht 5' 4\" (1.626 m)   Wt 126 lb (57.2 kg)   SpO2 91%   BMI 21.63 kg/m²       Physical Exam  General: Alert, well-developed, no acute distress lying comfortably in bed. HEENT: Atraumatic normocephalic, range of motion normal, no JVD, no tracheal deviation noted. Cardiac: Normal S1, with loud S2, + systolic murmur  Respiratory: Effort normal, breath sounds normal, clear To auscultation bilaterally, no rhonchi or rales, no wheezing  Abdomen: Soft, positive bowel sounds in all quadrants, no distention, nontender to palpation, no organomegaly noted, no rebound noted, no CVA tenderness. MSK/extremities: no tenderness, no edema, no deformity, moves all extremities  Skin: Warm, no erythema noted, no bruising and no lesions noted. Psych: Affect normal and good eye contact, behavioral normal, thought content normal and judgment normal  Neurological: No focal deficits, alert and conversant, no formal disorientation noted. No sensory deficits, no abnormal coordination. Did not assess gait.         Labs      Recent Results (from the past 24 hour(s))   Comprehensive Metabolic Panel    Collection Time: 07/19/21 11:15 AM   Result Value Ref Range    Sodium 141 136 - 145 mmol/L    Potassium 4.8 3.5 - 5.0 mmol/L    Chloride 103 98 - 111 mmol/L    CO2 30 (H) 22 - 29 mmol/L    Anion Gap 8 7 - 19 mmol/L    Glucose 89 74 - 109 mg/dL    BUN 31 (H) 8 - 23 mg/dL    CREATININE 1.0 (H) 0.5 - 0.9 mg/dL    GFR Non-African American 52 (A) >60    GFR African American >59 >59    Calcium 9.9 (H) 8.2 - 9.6 mg/dL    Total Protein 7.3 6.6 - 8.7 g/dL    Albumin 3.9 3.5 - 5.2 g/dL    Total Bilirubin 0.7 0.2 - 1.2 mg/dL    Alkaline Phosphatase 75 35 - 104 U/L    ALT 12 5 - 33 U/L    AST 23 5 - 32 U/L   CBC Auto Differential    Collection Time: 07/19/21 11:15 AM   Result Value Ref Range    WBC 8.2 4.8 - 10.8 K/uL RBC 4.57 4.20 - 5.40 M/uL    Hemoglobin 14.1 12.0 - 16.0 g/dL    Hematocrit 44.2 37.0 - 47.0 %    MCV 96.7 81.0 - 99.0 fL    MCH 30.9 27.0 - 31.0 pg    MCHC 31.9 (L) 33.0 - 37.0 g/dL    RDW 13.4 11.5 - 14.5 %    Platelets 943 959 - 629 K/uL    MPV 10.5 9.4 - 12.3 fL    Neutrophils % 63.9 50.0 - 65.0 %    Lymphocytes % 20.6 20.0 - 40.0 %    Monocytes % 10.4 (H) 0.0 - 10.0 %    Eosinophils % 3.2 0.0 - 5.0 %    Basophils % 1.3 (H) 0.0 - 1.0 %    Neutrophils Absolute 5.2 1.5 - 7.5 K/uL    Immature Granulocytes # 0.1 K/uL    Lymphocytes Absolute 1.7 1.1 - 4.5 K/uL    Monocytes Absolute 0.90 0.00 - 0.90 K/uL    Eosinophils Absolute 0.30 0.00 - 0.60 K/uL    Basophils Absolute 0.10 0.00 - 0.20 K/uL   Protime-INR    Collection Time: 07/19/21 11:15 AM   Result Value Ref Range    Protime 14.0 12.0 - 14.6 sec    INR 1.06 0.88 - 1.18   Troponin    Collection Time: 07/19/21 11:15 AM   Result Value Ref Range    Troponin <0.01 0.00 - 0.03 ng/mL   Urinalysis Reflex to Culture    Collection Time: 07/19/21 12:00 PM    Specimen: Urine, clean catch   Result Value Ref Range    Color, UA YELLOW Straw/Yellow    Clarity, UA CLOUDY (A) Clear    Glucose, Ur Negative Negative mg/dL    Bilirubin Urine Negative Negative    Ketones, Urine Negative Negative mg/dL    Specific Gravity, UA 1.016 1.005 - 1.030    Blood, Urine TRACE (A) Negative    pH, UA 7.0 5.0 - 8.0    Protein, UA Negative Negative mg/dL    Urobilinogen, Urine 1.0 <2.0 E.U./dL    Nitrite, Urine POSITIVE (A) Negative    Leukocyte Esterase, Urine LARGE (A) Negative   Microscopic Urinalysis    Collection Time: 07/19/21 12:00 PM   Result Value Ref Range    Bacteria, UA 4+ (A) None Seen /HPF    Crystals, UA NEG (A) None Seen /HPF    Hyaline Casts, UA 0 0 - 8 /HPF    WBC,  (H) 0 - 5 /HPF    RBC, UA 4 0 - 4 /HPF    Epithelial Cells, UA 0 0 - 5 /HPF   COVID-19, Rapid    Collection Time: 07/19/21  2:20 PM    Specimen: Nasopharyngeal Swab   Result Value Ref Range    SARS-CoV-2, NAAT Not Detected Not Detected        Imaging/Diagnostics Last 24 Hours   CT HEAD WO CONTRAST    Result Date: 7/19/2021  EXAMINATION: CT HEAD WO CONTRAST 7/19/2021 1:33 PM HISTORY: Stroke symptoms, difficulty with speech. Listing to the right. DOSE: 708 mGycm. Automatic exposure control was utilized in an effort to use as little radiation as possible, without compromising image quality. REPORT: Spiral CT of the head was performed without contrast, reconstructed coronal and sagittal images are also reviewed. COMPARISON: CT head without contrast 7/7/2019. No intracranial hemorrhage, mass or mass effect is identified. There is diffuse cerebral atrophy. There is mild dilation of the ventricular system. This is stable. There is mild to moderate decreased attenuation in the periventricular and subcortical white matter tracts compatible chronic small vessel white matter ischemic disease. This is stable. There is a small round chronic lacunar infarct in the right cerebellar hemisphere. This is stable. Review of bone windows is negative for fractures. There is normal aeration of the visualized paranasal sinuses and mastoid air cells. 1. No intracranial hemorrhage or acute findings. MRI is more sensitive for acute stroke. 2. Diffuse atrophy and chronic small vessel white matter ischemic changes. 3. Probable small sub-5 mm focal chronic lacunar infarct in the right cerebellum, stable. Signed by Dr Rashad Dawkins. [place]    XR CHEST PORTABLE    Result Date: 7/19/2021  EXAMINATION: Chest one view 7/19/2021 HISTORY: CVA. FINDINGS: Upright frontal projection of chest is compared to prior exam of 5/2/2019. There are increased interstitial markings of the lungs which are chronic in nature. Bibasilar atelectasis or scarring is present with more confluent disease now noted within the right lung perhaps representing a superimposed infiltrate. There is no evidence of free air.  There is arthrosis of both glenohumeral joints with a large loose body within the right axillary recess. 1.. Increased interstitial markings with scarring or atelectasis in the lung bases. There is some more confluent disease within the right lung including the mid and lower lung zone perhaps representing a superimposed infiltrate. Signed by Dr Dee Hamlin cerebrovascular accident (CVA) ([facility] [address] 7/19/2021 Yes          Plan       Right-sided weakness and aphasia with concerns of CVA. CT head with chronic lacunar infarct. Neurochecks per protocol, monitor for any new or evolving symptoms. Echo ordered  Carotid ultrasound  Monitor on telemetry for any arrhythmia  Lipid panel as well as A1c ordered for a.m.  TSH ordered  Neurology consulted. PT/OT/speech eval  Continue aspirin, Plavix added    Urinary tract infection  Ceftriaxone given in the ED will continue in-house. Hypertension  Nifedipine 60 mg twice daily. CKD stage II: Currently stable continue to monitor. Code: DNR  DVT prophylaxis: Enoxaparin  Disposition: Pending completion of above work-up.         Consultations Ordered:  IP CONSULT TO NEUROLOGY    Electronically signed by Carina Sanchez MD on 7/19/21 at 4:46 PM CDT

## 2021-07-20 PROBLEM — Z51.5 PALLIATIVE CARE PATIENT: Status: ACTIVE | Noted: 2021-07-20

## 2021-07-20 LAB
ANION GAP SERPL CALCULATED.3IONS-SCNC: 8 MMOL/L (ref 7–19)
BUN BLDV-MCNC: 26 MG/DL (ref 8–23)
CALCIUM SERPL-MCNC: 9.5 MG/DL (ref 8.2–9.6)
CHLORIDE BLD-SCNC: 101 MMOL/L (ref 98–111)
CHOLESTEROL, TOTAL: 118 MG/DL (ref 160–199)
CO2: 31 MMOL/L (ref 22–29)
CREAT SERPL-MCNC: 0.8 MG/DL (ref 0.5–0.9)
EKG P AXIS: 90 DEGREES
EKG P-R INTERVAL: 224 MS
EKG Q-T INTERVAL: 442 MS
EKG QRS DURATION: 92 MS
EKG QTC CALCULATION (BAZETT): 435 MS
EKG T AXIS: 73 DEGREES
GFR AFRICAN AMERICAN: >59
GFR NON-AFRICAN AMERICAN: >60
GLUCOSE BLD-MCNC: 91 MG/DL (ref 74–109)
HBA1C MFR BLD: 5.3 % (ref 4–6)
HCT VFR BLD CALC: 41.8 % (ref 37–47)
HDLC SERPL-MCNC: 60 MG/DL (ref 65–121)
HEMOGLOBIN: 13.5 G/DL (ref 12–16)
LDL CHOLESTEROL CALCULATED: 44 MG/DL
LV EF: 58 %
LVEF MODALITY: NORMAL
MCH RBC QN AUTO: 30.8 PG (ref 27–31)
MCHC RBC AUTO-ENTMCNC: 32.3 G/DL (ref 33–37)
MCV RBC AUTO: 95.4 FL (ref 81–99)
PDW BLD-RTO: 13.2 % (ref 11.5–14.5)
PLATELET # BLD: 188 K/UL (ref 130–400)
PMV BLD AUTO: 10.3 FL (ref 9.4–12.3)
POTASSIUM REFLEX MAGNESIUM: 4.3 MMOL/L (ref 3.5–5)
RBC # BLD: 4.38 M/UL (ref 4.2–5.4)
SODIUM BLD-SCNC: 140 MMOL/L (ref 136–145)
T4 FREE: 1.35 NG/DL (ref 0.93–1.7)
TRIGL SERPL-MCNC: 70 MG/DL (ref 0–149)
TSH REFLEX FT4: 5.12 UIU/ML (ref 0.35–5.5)
WBC # BLD: 8.7 K/UL (ref 4.8–10.8)

## 2021-07-20 PROCEDURE — 97165 OT EVAL LOW COMPLEX 30 MIN: CPT

## 2021-07-20 PROCEDURE — 97161 PT EVAL LOW COMPLEX 20 MIN: CPT

## 2021-07-20 PROCEDURE — 6370000000 HC RX 637 (ALT 250 FOR IP): Performed by: PSYCHIATRY & NEUROLOGY

## 2021-07-20 PROCEDURE — 1210000000 HC MED SURG R&B

## 2021-07-20 PROCEDURE — 93010 ELECTROCARDIOGRAM REPORT: CPT | Performed by: INTERNAL MEDICINE

## 2021-07-20 PROCEDURE — 85027 COMPLETE CBC AUTOMATED: CPT

## 2021-07-20 PROCEDURE — 99232 SBSQ HOSP IP/OBS MODERATE 35: CPT | Performed by: PSYCHIATRY & NEUROLOGY

## 2021-07-20 PROCEDURE — 83036 HEMOGLOBIN GLYCOSYLATED A1C: CPT

## 2021-07-20 PROCEDURE — 80061 LIPID PANEL: CPT

## 2021-07-20 PROCEDURE — 84439 ASSAY OF FREE THYROXINE: CPT

## 2021-07-20 PROCEDURE — 36415 COLL VENOUS BLD VENIPUNCTURE: CPT

## 2021-07-20 PROCEDURE — 6360000002 HC RX W HCPCS: Performed by: HOSPITALIST

## 2021-07-20 PROCEDURE — 80048 BASIC METABOLIC PNL TOTAL CA: CPT

## 2021-07-20 PROCEDURE — 6370000000 HC RX 637 (ALT 250 FOR IP): Performed by: HOSPITALIST

## 2021-07-20 PROCEDURE — 97116 GAIT TRAINING THERAPY: CPT

## 2021-07-20 PROCEDURE — 84443 ASSAY THYROID STIM HORMONE: CPT

## 2021-07-20 PROCEDURE — 2580000003 HC RX 258: Performed by: HOSPITALIST

## 2021-07-20 PROCEDURE — 97535 SELF CARE MNGMENT TRAINING: CPT

## 2021-07-20 PROCEDURE — 93306 TTE W/DOPPLER COMPLETE: CPT

## 2021-07-20 RX ORDER — NIFEDIPINE 30 MG/1
30 TABLET, EXTENDED RELEASE ORAL DAILY
Status: DISCONTINUED | OUTPATIENT
Start: 2021-07-20 | End: 2021-07-21 | Stop reason: HOSPADM

## 2021-07-20 RX ADMIN — CETIRIZINE HYDROCHLORIDE 10 MG: 10 TABLET, FILM COATED ORAL at 10:18

## 2021-07-20 RX ADMIN — TROSPIUM CHLORIDE 20 MG: 20 TABLET, FILM COATED ORAL at 15:09

## 2021-07-20 RX ADMIN — MONTELUKAST 10 MG: 10 TABLET, FILM COATED ORAL at 21:11

## 2021-07-20 RX ADMIN — SODIUM CHLORIDE, PRESERVATIVE FREE 10 ML: 5 INJECTION INTRAVENOUS at 21:12

## 2021-07-20 RX ADMIN — Medication 2000 UNITS: at 10:18

## 2021-07-20 RX ADMIN — METOPROLOL TARTRATE 50 MG: 50 TABLET, FILM COATED ORAL at 10:18

## 2021-07-20 RX ADMIN — CALCIUM 500 MG: 500 TABLET ORAL at 10:18

## 2021-07-20 RX ADMIN — CLOPIDOGREL BISULFATE 75 MG: 75 TABLET ORAL at 10:18

## 2021-07-20 RX ADMIN — Medication 5 MG: at 21:11

## 2021-07-20 RX ADMIN — ATORVASTATIN CALCIUM 20 MG: 20 TABLET, FILM COATED ORAL at 21:12

## 2021-07-20 RX ADMIN — ASPIRIN 81 MG: 81 TABLET, COATED ORAL at 10:18

## 2021-07-20 RX ADMIN — METOPROLOL TARTRATE 50 MG: 50 TABLET, FILM COATED ORAL at 21:11

## 2021-07-20 RX ADMIN — SODIUM CHLORIDE, PRESERVATIVE FREE 1000 MG: 5 INJECTION INTRAVENOUS at 15:08

## 2021-07-20 ASSESSMENT — ENCOUNTER SYMPTOMS
VOMITING: 0
SHORTNESS OF BREATH: 0
COUGH: 0
PHOTOPHOBIA: 0
BACK PAIN: 0
NAUSEA: 0

## 2021-07-20 NOTE — ACP (ADVANCE CARE PLANNING)
Advance Care Planning     Advance Care Planning Activator (Inpatient)  Conversation Note      Date of ACP Conversation: 7/20/2021     [employer] with: Pt with decision making capacity. ACP Activator: Thomas Shankar RN        Health Care Decision Maker:     Current [facility] Decision Maker:   Carleen Israel [phone]  Martina Selby [phone]    Care Preferences    Ventilation: \"If you were in your present state of health and suddenly became very ill and were unable to breathe on your own, what would your preference be about the use of a ventilator (breathing machine) if it were available to you? \"      Would the patient desire the use of ventilator (breathing machine)?: No        Resuscitation  \"In the event your heart stopped as a result of an underlying serious health condition, would you want attempts to be made to restart your heart (answer \"yes\" for attempt to resuscitate) or would you prefer a natural death (answer \"no\" for do not attempt to resuscitate)? \" No        Conversation Outcomes:  [x] ACP discussion completed  [] Existing advance directive reviewed with patient; no changes to patient's previously recorded wishes  [] New Advance Directive completed  [] Portable Do Not Rescitate prepared for Provider review and signature  [] POLST/POST/MOLST/MOST prepared for Provider review and signature      Follow-up plan:    [] Schedule follow-up conversation to continue planning  [] Referred individual to Provider for additional questions/concerns   [] Advised patient/agent/surrogate to review completed ACP document and update if needed with changes in condition, patient preferences or care setting    [] This note routed to one or more involved healthcare providers

## 2021-07-20 NOTE — CONSULTS
Palliative Care: This is a delightful 80 yr old lady who presents to ED with TIA sx, right side weakness,  Difficulty with speech. Dtr states pt has not felt well for approx 10 days per notes. Pt is sitting up in chair playing solitaire on her ipad. She is alert and oriented and answers all my questions appropriately. Past Medical History:        Past Medical History:   Diagnosis Date    Arthritis     Cerebral artery occlusion with cerebral infarction [facility])     possible this admit     Chronic kidney disease     Essential hypertension 8/14/2017    Hypertension     Impacted cerumen of right ear 8/14/2017    Movement disorder        Advance Directives:    DNR-CC reviewed with pt no changes. ACP complete. Pain/Other Symptoms:   Denies pain, states she is having leg cramps. She tells me this not new. She has leg cramps on occasion and states she uses biofreeze at home. Activity:  As sara          Psychological/Spiritual:    Pt states she has good family and spiritual support. Son lives next door to pt. Plan:   Hopeful home today after MDs have rounded. Patient/family discussion r/t goals:  Pt tells me she lives alone. Does not drive any longer. Family sees to her needs and will do her grocery shopping and errands as well as appts when needed. Pt states prior to pandemic she was going to the gym for swimming exercise as well as using some machines. She states since she has not been able to attend is when leg cramps began to get worse. Goal is to return to her home and resume her usual life. Pt tells me her dtr also works here as a volunteer. Contacts are updated. Palliative will follow as needed.           Electronically signed by Germania Hassan RN on 7/20/2021 at 1:05 PM

## 2021-07-20 NOTE — PROGRESS NOTES
Physical Therapy    Facility/Department: [facility] ONCOLOGY UNIT  Initial Assessment    NAME: Paul Gay  : 10/14/1926  MRN: [id number]    Date of Service: 2021    Discharge Recommendations:  Continue to assess pending progress, 24 hour supervision or assist, Patient would benefit from continued therapy after discharge        Assessment   Body structures, Functions, Activity limitations: Decreased functional mobility ; Decreased balance  Assessment: Pt. will benefit from cont. PT to decrease impairments. Pt. most likely very close to her prior functional level. Pt. would be safe to d/c home with family A. Treatment Diagnosis: SBA mobility and gait  Prognosis: Good  Decision Making: Low Complexity  PT Education: Goals;PT Role;General Safety  Barriers to Learning: none noted  REQUIRES PT FOLLOW UP: Yes  Activity Tolerance  Activity Tolerance: Patient Tolerated treatment well       Patient Diagnosis(es): The primary encounter diagnosis was Stroke determined by clinical assessment ([facility] [address] A diagnosis of Urinary tract infection without hematuria, site unspecified was also pertinent to this visit. has a past medical history of Arthritis, Cerebral artery occlusion with cerebral infarction [facility]), Chronic kidney disease, Essential hypertension, Hypertension, Impacted cerumen of right ear, and Movement disorder. has a past surgical history that includes hip surgery (Bilateral); Tonsillectomy; Cholecystectomy; Appendectomy; and Breast biopsy (Bilateral).     Restrictions  Restrictions/Precautions  Restrictions/Precautions: General Precautions  Required Braces or Orthoses?: No  Vision/Hearing  Vision: Impaired  Vision Exceptions: Wears glasses at all times  Hearing: Exceptions to [facility]  Hearing Exceptions: Hard of hearing/hearing concerns;Bilateral hearing aid     Subjective  General  Chart Reviewed: Yes  Patient assessed for rehabilitation services?: Yes  Response To Previous Treatment: Not applicable  Family / Caregiver Present: No  Referring Practitioner: Maikel Goodwin MD  Referral Date : 07/19/21  Diagnosis: acute CVA, UTI without hematuria  Follows Commands: Within Functional Limits  General Comment  Comments: RN, Alex tSeven PT. Subjective  Subjective: Pt. reports cramps in legs but states she is better now.   Pain Screening  Patient Currently in Pain: No  Vital Signs  Patient Currently in Pain: No  Pre Treatment Pain Screening  Intervention List: Patient able to continue with treatment    Orientation  Orientation  Overall Orientation Status: Within Normal Limits  Social/Functional History  Social/Functional History  Lives With: Alone (son lives next door)  Type of Home: House  Home Layout: One level  Home Access: Ramped entrance  Bathroom Shower/Tub: Walk-in shower  Bathroom Toilet: Handicap height  Bathroom Equipment: Shower chair  Home Equipment: 4 wheeled walker, Cane (tripod RW)  Receives Help From: Family  ADL Assistance: Independent  Ambulation Assistance: Independent (tripod RW)  Transfer Assistance: Independent  Active : No  Leisure & Hobbies: reading, quilting and sewing  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, pt cooks, reports falls but not recently  [employer]  Overall Cognitive Status: WNL    Objective     Observation/Palpation  Posture: Good    AROM RLE (degrees)  RLE AROM: WFL  AROM LLE (degrees)  LLE AROM : WFL  Strength RLE  Strength RLE: WFL  Comment: grossly 4/5  Strength LLE  Strength LLE: WFL  Comment: grossly 4/5     Sensation  Overall Sensation Status: Impaired (soles of feet are numb/tingling)  Bed mobility  Supine to Sit: Unable to assess  Sit to Supine: Unable to assess  Transfers  Sit to Stand: Stand by assistance  Stand to sit: Stand by assistance  Ambulation  Ambulation?: Yes  Ambulation 1  Surface: level tile  Device:  (tripod RW)  Assistance: Stand by assistance  Quality of Gait: steady  Gait Deviations: Slow Suzanna;Decreased step length;Decreased step height  Distance: 200'     Balance  Posture: Good  Sitting - Static: Good;+  Sitting - Dynamic: Good;-  Standing - Static: Fair;+  Standing - Dynamic: Fair;-        Plan   Plan  Times per week: 3-7  Times per day: Daily  Plan weeks: 2  Current Treatment Recommendations: Strengthening, Balance Training, Functional Mobility Training, Transfer Training  Safety Devices  Type of devices: Gait belt, Patient at risk for falls, Nurse notified, Call light within reach, Left in chair, Chair alarm in place    G-Code       OutComes Score                                                  AM-PAC Score             Goals  Short term goals  Time Frame for Short term goals: 2 wks  Short term goal 1: supine to sit indep  Short term goal 2: sit to stand indep  Short term goal 3: amb. 300' with AD indep  Patient Goals   Patient goals : go home today       Therapy Time   Individual Concurrent Group Co-treatment   Time In           Time Out           Minutes                   Cecy Ariza PT    Electronically signed by Cecy Ariza PT on 7/20/2021 at 12:11 PM

## 2021-07-20 NOTE — PROGRESS NOTES
[facility]  [address] [place] [address] Drive, [address]  [facility], [facility] [address]  Phone [phone]     Neurology Progress Note  2021 1:34 PM  Information:   Patient Name: Bridget Bobo  :   10/14/1926  Age:   80 y.o. MRN:   [id number]  Account #:  [de-identified]  Admit Date:   2021  Today:  21     ADMIT DX:   Stroke    Subjective:     HISTORY OF PRESENT ILLNESS:   Bridget Bobo is a 80y.o. year old woman with a history of hypertension who was admitted yesterday with dysarthria. Interval History:   She feels a little better. She is able to eat and swallow. She has had no impairment in her balance or walking from the stroke but has had some imbalance chronically. Objective:     Past Medical History:  Past Medical History:   Diagnosis Date    Arthritis     Cerebral artery occlusion with cerebral infarction [facility])     possible this admit     Chronic kidney disease     Essential hypertension 2017    Hypertension     Impacted cerumen of right ear 2017    Movement disorder     Palliative care patient 2021       Past Surgical History:   Procedure Laterality Date    APPENDECTOMY      BREAST BIOPSY Bilateral     CHOLECYSTECTOMY      HIP SURGERY Bilateral     TONSILLECTOMY         No family history on file. Social History     Socioeconomic History    Marital status:      Spouse name: Not on file    Number of children: Not on file    Years of education: Not on file    Highest education level: Not on file   Occupational History    Not on file   Tobacco Use    Smoking status: Never Smoker    Smokeless tobacco: Never Used   Substance and Sexual Activity    Alcohol use:  Yes     Alcohol/week: 7.0 standard drinks     Types: 7 Glasses of wine per week    Drug use: No    Sexual activity: Never   Other Topics Concern    Not on file   Social History Narrative    Not on file     Social Determinants of Health     Financial Resource Strain: Low Risk     Difficulty of Paying Living Expenses: Not very hard   Food Insecurity: No Food Insecurity    Worried About Running Out of Food in the Last Year: Never true    Ran Out of Food in the Last Year: Never true   Transportation Needs:     Lack of Transportation (Medical):      Lack of Transportation (Non-Medical):    Physical Activity:     Days of Exercise per Week:     Minutes of Exercise per Session:    Stress:     Feeling of Stress :    Social Connections:     Frequency of Communication with Friends and Family:     Frequency of Social Gatherings with Friends and Family:     Attends Yarsanism Services:     Active Member of Clubs or Organizations:     Attends Club or Organization Meetings:     Marital Status:    Intimate Partner Violence:     Fear of Current or Ex-Partner:     Emotionally Abused:     Physically Abused:     Sexually Abused:        Medications:   sodium chloride        NIFEdipine  30 mg Oral Daily    aspirin  81 mg Oral Daily    cetirizine  10 mg Oral Daily    Vitamin D  2,000 Units Oral Daily    melatonin  5 mg Oral Nightly    metoprolol tartrate  50 mg Oral BID    trospium  20 mg Oral BID AC    montelukast  10 mg Oral Nightly    sodium chloride flush  5-40 mL Intravenous 2 times per day    enoxaparin  30 mg Subcutaneous Nightly    clopidogrel  75 mg Oral Daily    cefTRIAXone (ROCEPHIN) IV  1,000 mg Intravenous Q24H    atorvastatin  20 mg Oral Nightly    calcium elemental  500 mg Oral Daily       Diagnostic Studies:  Reviewed all labs/diagnositcs since last 24hrs:  Recent Results (from the past 24 hour(s))   COVID-19, Rapid    Collection Time: 07/19/21  2:20 PM    Specimen: Nasopharyngeal Swab   Result Value Ref Range    SARS-CoV-2, NAAT Not Detected Not Detected   Lipid Panel    Collection Time: 07/20/21  3:34 AM   Result Value Ref Range    Cholesterol, Total 118 (L) 160 - 199 mg/dL    Triglycerides 70 0 - 149 mg/dL    HDL 60 (L) 65 - 121 mg/dL    LDL Calculated 44 <100 mg/dL   Hemoglobin A1C Collection Time: 07/20/21  3:34 AM   Result Value Ref Range    Hemoglobin A1C 5.3 4.0 - 6.0 %   TSH WITH REFLEX TO FT4    Collection Time: 07/20/21  3:34 AM   Result Value Ref Range    TSH Reflex FT4 5.12 0.35 - 5.50 uIU/mL   Basic Metabolic Panel w/ Reflex to MG    Collection Time: 07/20/21  3:34 AM   Result Value Ref Range    Sodium 140 136 - 145 mmol/L    Potassium reflex Magnesium 4.3 3.5 - 5.0 mmol/L    Chloride 101 98 - 111 mmol/L    CO2 31 (H) 22 - 29 mmol/L    Anion Gap 8 7 - 19 mmol/L    Glucose 91 74 - 109 mg/dL    BUN 26 (H) 8 - 23 mg/dL    CREATININE 0.8 0.5 - 0.9 mg/dL    GFR Non-African American >60 >60    GFR African American >59 >59    Calcium 9.5 8.2 - 9.6 mg/dL   CBC    Collection Time: 07/20/21  3:34 AM   Result Value Ref Range    WBC 8.7 4.8 - 10.8 K/uL    RBC 4.38 4.20 - 5.40 M/uL    Hemoglobin 13.5 12.0 - 16.0 g/dL    Hematocrit 41.8 37.0 - 47.0 %    MCV 95.4 81.0 - 99.0 fL    MCH 30.8 27.0 - 31.0 pg    MCHC 32.3 (L) 33.0 - 37.0 g/dL    RDW 13.2 11.5 - 14.5 %    Platelets 624 664 - 271 K/uL    MPV 10.3 9.4 - 12.3 fL   T4, Free    Collection Time: 07/20/21  3:34 AM   Result Value Ref Range    T4 Free 1.35 0.93 - 1.70 ng/dL       Diet:  ADULT DIET; Easy to Chew; Low Fat/Low Chol/High Fiber/MORENO    Examination:  Vitals: BP (!) 122/51   Pulse 87   Temp 96.3 °F (35.7 °C) (Temporal)   Resp 18   Ht 5' 4\" (1.626 m)   Wt 126 lb (57.2 kg)   SpO2 92%   BMI 21.63 kg/m²      Intake/Output Summary (Last 24 hours) at 7/20/2021 1334  Last data filed at 7/20/2021 1000  Gross per 24 hour   Intake 300 ml   Output    Net 300 ml     Carotid US prelim - no significant stenosis    Narrative & Impression  Transthoracic Echocardiography Report (TTE)      Demographics      Patient Name  Deana PAUL Date of Study          07/20/2021      MRN           [id number]        Gender                 Female      Date of Birth 10/14/1926    Room Number            VUL-2190      Age           80 year(s)      Height:       64 jenna     Referring Physician    Lyndal Hodgkins      Weight:       125 pounds    Sonographer            Paige Sierra [facility]      BSA:          1.6 m^2       Interpreting Physician Shilpi Correia MD      BMI:          21.46 kg/m^2     Procedure     Type of Study      TTE procedure:ECHO NO CONTRAST WITH DOP/COLR. Study Location: Echo Lab  Technical Quality: Adequate visualization     Patient Status: Inpatient     Contrast Medium: Bubble Study.     Rhythm: Within normal limits BP: 122/51 mmHg     Indications:CVA.      Conclusions      Summary   Mitral valve leaflets are mildly thickened with preserved leaflet   mobility. Moderately severe mitral regurgitation is present. Wall hugging jet present   Mildly thickened aortic valve leaflets with preserved leaflet mobility. Mild aortic regurgitation is noted. Tricuspid valve is structurally normal.   Mild to moderate tricuspid regurgitation with estimated RVSP of 43 mm Hg. Mildly dilated left atrium. Left atrial appendage well visualized. Normal size with reduced Doppler   velocities. No evidence of thrombus or spontaneous contrast noted within   the JERONIMO. Normal left ventricular size with preserved LV function and an estimated   ejection fraction of approximately 55-60%. Mild concentric left ventricular hypertrophy. No regional wall motion abnormalities. Mildly enlarged right atrium size. No evidence of significant pericardial effusion is noted. Signature      ----------------------------------------------------------------   Electronically signed by Shilpi Correia MD(Interpreting   physician) on 07/20/2021 02:54 PM    General appearance: alert and cooperative with exam  HEENT: Exam; heent: Head: Normal, normocephalic, atraumatic.   Lungs: clear to auscultation bilaterally  Heart: regular rate and rhythm, S1, S2 normal, no murmur, click, rub or gallop  Abdomen: soft, non-tender; bowel sounds normal; no masses,  no organomegaly  Extremities: extremities normal, atraumatic, no cyanosis or edema  Neurologic:  Alert, oriented. No dysarthria. Speech is mildly dysarthric. EOMI, PERRL, VFF. No facial weakness. Limb strength is normal.  No pronator drift. Rapid alternating movements are normal.  Finger to nose testing shows no dysmetria. Assessment:   1. Stroke with dysarthria  2. Hypertension    Plan:   1. Increase ASA to 325 mg daily  2. HH for ST/PT  3. Abx  4. OK with me for her going home. Neuro FU PRN     Discharge planning:   Home    Reviewed treatment plans with the patient and/or family. 25 minutes spent in face to face interaction and coordination of care.      Electronically signed by Marty Tyler MD on 7/20/2021 at 1:34 PM

## 2021-07-20 NOTE — PROGRESS NOTES
Occupational Therapy   Occupational Therapy Initial Assessment  Date: 2021   Patient Name: Wilmer Sarah  MRN: [id number]     : 10/14/1926    Date of Service: 2021    Discharge Recommendations:   (Home with intermittent supervision from family)       Assessment   Assessment: Evaluation completed and tx initiated. Likely very close to baseline function. All recommendations and training completed this visit. No barriers to previous living arrangements identified. Treatment Diagnosis: CVA  REQUIRES OT FOLLOW UP: No  Activity Tolerance  Activity Tolerance: Patient Tolerated treatment well  Safety Devices  Safety Devices in place: Yes  Type of devices: Chair alarm in place;Call light within reach           Patient Diagnosis(es): The primary encounter diagnosis was Stroke determined by clinical assessment ([facility] [address] A diagnosis of Urinary tract infection without hematuria, site unspecified was also pertinent to this visit. has a past medical history of Arthritis, Cerebral artery occlusion with cerebral infarction [facility]), Chronic kidney disease, Essential hypertension, Hypertension, Impacted cerumen of right ear, and Movement disorder. has a past surgical history that includes hip surgery (Bilateral); Tonsillectomy; Cholecystectomy; Appendectomy; and Breast biopsy (Bilateral).     Treatment Diagnosis: CVA      Restrictions  Restrictions/Precautions  Restrictions/Precautions: Fall Risk  Required Braces or Orthoses?: No    Subjective   General  Chart Reviewed: Yes  Patient assessed for rehabilitation services?: Yes  Family / Caregiver Present: No  Patient Currently in Pain: No  Vital Signs  Patient Currently in Pain: No  Social/Functional History  Social/Functional History  Lives With: Alone (son lives next door)  Type of Home: House  Home Layout: One level  Home Access: Ramped entrance  Bathroom Shower/Tub: Walk-in shower  Bathroom Toilet: Handicap height  Bathroom Equipment: Shower chair  Home Equipment: 4 wheeled walker, Cane (tripod RW)  Receives Help From: Family  ADL Assistance: Independent  Ambulation Assistance: Independent (tripod RW)  Transfer Assistance: Independent  Active : No  Leisure & Hobbies: reading, quilting and sewing  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, pt cooks       Objective   Vision Exceptions: Wears glasses at all times  Hearing Exceptions: Hard of hearing/hearing concerns;Bilateral hearing aid    Orientation  Overall Orientation Status: Within Normal Limits  Observation/Palpation  Posture: Good  Balance  Sitting Balance: Independent  Standing Balance: Supervision  Functional Mobility  Assist Level: Supervision  Functional Mobility Comments: 3 wheel walker  Toilet Transfers  Toilet Transfer: Independent;Supervision  ADL  Feeding: Independent  Grooming: Independent  UE Bathing: Independent  LE Bathing: Supervision  UE Dressing: Independent  LE Dressing: Supervision  Toileting: Supervision  Coordination  Movements Are Fluid And Coordinated: Yes        Transfers  Stand Step Transfers: Independent;Supervision     Cognition  Overall Cognitive Status: WNL        Sensation  Overall Sensation Status: Impaired (soles of feet are numb/tingling)        LUE AROM (degrees)  LUE General AROM: shoulder flexion 0-80, distally WNl  RUE AROM (degrees)  RUE General AROM: shoulder flexion 0-90                    Tx initiated:  No losses of balance during ambulatory ADL identified. Home safety recommendations made.  (15 mins)    Plan   Plan  Plan Comment: No further visits planned    G-Code     OutComes Score                                                  AM-PAC Score             Goals  Short term goals  Short term goal 1: Patient will demo no loss of balance during light ambulatory ADL (met)  Short term goal 2: Verbalize home safety recommendations (met)       Therapy Time   Individual Concurrent Group Co-treatment   Time In           Time Out           Minutes                   Raine T[facility] Electronically signed by Roshni Hoffman OT on 7/20/2021 at 1:00 PM

## 2021-07-20 NOTE — CARE COORDINATION
CM Initial Assessment   Met with pt to assess [place] plans/needs. The following information has been reviewed and confirmed: Address:  [address]     Physical:   [address] [place], [place], [place]     Phone number:   [phone] (home)       Pt reports that she lives at home alone. She has support from her family when she is at home. She uses a walker for ambulation at baseline. She plans to return home at [place]. She reports her medications as affordable. She is not interested in [address] [place] [address] Henrry Rojas services at [place]. Denies any further needs at this time.    Electronically signed by Estefanía Calle on 7/20/2021 at 1:31 PM

## 2021-07-20 NOTE — PROGRESS NOTES
bowel sounds in all quadrants, no distention, nontender to palpation  MSK/extremities: no tenderness, no edema, no deformity, moves all extremities  Skin: Warm, no erythema noted. Psych: Affect normal and good eye contact, behavioral normal, thought content normal and judgment normal  Neurological: No focal deficits, alert and conversant, no formal disorientation noted. No sensory deficits, no abnormal coordination. Did not assess gait. Labs/Imaging/Diagnostics    Labs:  CBC:  Recent Labs     07/19/21  1115 07/20/21  0334   WBC 8.2 8.7   RBC 4.57 4.38   HGB 14.1 13.5   HCT 44.2 41.8   MCV 96.7 95.4   RDW 13.4 13.2    188     CHEMISTRIES:  Recent Labs     07/19/21  1115 07/20/21  0334    140   K 4.8 4.3    101   CO2 30* 31*   BUN 31* 26*   CREATININE 1.0* 0.8   GLUCOSE 89 91     PT/INR:  Recent Labs     07/19/21  1115   PROTIME 14.0   INR 1.06     APTT:No results for input(s): APTT in the last 72 hours. LIVER PROFILE:  Recent Labs     07/19/21  1115   AST 23   ALT 12   BILITOT 0.7   ALKPHOS 75       Imaging Last 24 Hours:  CT HEAD WO CONTRAST    Result Date: 7/19/2021  EXAMINATION: CT HEAD WO CONTRAST 7/19/2021 1:33 PM HISTORY: Stroke symptoms, difficulty with speech. Listing to the right. DOSE: 708 mGycm. Automatic exposure control was utilized in an effort to use as little radiation as possible, without compromising image quality. REPORT: Spiral CT of the head was performed without contrast, reconstructed coronal and sagittal images are also reviewed. COMPARISON: CT head without contrast 7/7/2019. No intracranial hemorrhage, mass or mass effect is identified. There is diffuse cerebral atrophy. There is mild dilation of the ventricular system. This is stable. There is mild to moderate decreased attenuation in the periventricular and subcortical white matter tracts compatible chronic small vessel white matter ischemic disease. This is stable.  There is a small round chronic lacunar infarct in the right cerebellar hemisphere. This is stable. Review of bone windows is negative for fractures. There is normal aeration of the visualized paranasal sinuses and mastoid air cells. 1. No intracranial hemorrhage or acute findings. MRI is more sensitive for acute stroke. 2. Diffuse atrophy and chronic small vessel white matter ischemic changes. 3. Probable small sub-5 mm focal chronic lacunar infarct in the right cerebellum, stable. Signed by Dr Nadeem Bradford. [place]    XR CHEST PORTABLE    Result Date: 7/19/2021  EXAMINATION: Chest one view 7/19/2021 HISTORY: CVA. FINDINGS: Upright frontal projection of chest is compared to prior exam of 5/2/2019. There are increased interstitial markings of the lungs which are chronic in nature. Bibasilar atelectasis or scarring is present with more confluent disease now noted within the right lung perhaps representing a superimposed infiltrate. There is no evidence of free air. There is arthrosis of both glenohumeral joints with a large loose body within the right axillary recess. 1.. Increased interstitial markings with scarring or atelectasis in the lung bases. There is some more confluent disease within the right lung including the mid and lower lung zone perhaps representing a superimposed infiltrate. Signed by Dr Nan Golden    Acute cerebrovascular accident (CVA) ([facility] [address] 7/19/2021 Yes        Assessment & Plan      Right-sided weakness and aphasia with concerns of CVA. CT head with chronic lacunar infarct. Neurochecks per protocol, monitor for any new or evolving symptoms. Echo ordered awaiting results. Carotid ultrasound  Monitor on telemetry for any arrhythmia  LDL of 44, A1c of 5.3.  TSH normal.  Neurology following.   PT/OT/speech eval  Continue aspirin, Plavix added     Urinary tract infection  Continue ceftriaxone     Hypertension  Nifedipine 30 mg daily     CKD stage II: Currently stable continue to monitor.        Code: DNR  DVT prophylaxis: Enoxaparin  Disposition: Likely discharge tomorrow         Electronically signed by Teresa Farmer MD on 7/20/21 at 12:54 PM CDT

## 2021-07-21 ENCOUNTER — TELEPHONE (OUTPATIENT)
Dept: INTERNAL MEDICINE | Age: 86
End: 2021-07-21

## 2021-07-21 VITALS
BODY MASS INDEX: 21.51 KG/M2 | TEMPERATURE: 97.4 F | OXYGEN SATURATION: 96 % | RESPIRATION RATE: 18 BRPM | HEART RATE: 79 BPM | DIASTOLIC BLOOD PRESSURE: 68 MMHG | SYSTOLIC BLOOD PRESSURE: 130 MMHG | HEIGHT: 64 IN | WEIGHT: 126 LBS

## 2021-07-21 LAB
ORGANISM: ABNORMAL
URINE CULTURE, ROUTINE: ABNORMAL
URINE CULTURE, ROUTINE: ABNORMAL

## 2021-07-21 PROCEDURE — 6370000000 HC RX 637 (ALT 250 FOR IP): Performed by: HOSPITALIST

## 2021-07-21 PROCEDURE — 6370000000 HC RX 637 (ALT 250 FOR IP): Performed by: PSYCHIATRY & NEUROLOGY

## 2021-07-21 PROCEDURE — 2580000003 HC RX 258: Performed by: HOSPITALIST

## 2021-07-21 RX ORDER — CEFDINIR 300 MG/1
300 CAPSULE ORAL 2 TIMES DAILY
Qty: 6 CAPSULE | Refills: 0 | Status: SHIPPED | OUTPATIENT
Start: 2021-07-21 | End: 2021-07-24

## 2021-07-21 RX ORDER — CLOPIDOGREL BISULFATE 75 MG/1
75 TABLET ORAL DAILY
Qty: 30 TABLET | Refills: 3 | Status: SHIPPED | OUTPATIENT
Start: 2021-07-22 | End: 2021-01-01

## 2021-07-21 RX ORDER — NIFEDIPINE 30 MG/1
30 TABLET, FILM COATED, EXTENDED RELEASE ORAL DAILY
Qty: 30 TABLET | Refills: 3 | Status: SHIPPED | OUTPATIENT
Start: 2021-07-22 | End: 2021-07-27

## 2021-07-21 RX ADMIN — CETIRIZINE HYDROCHLORIDE 10 MG: 10 TABLET, FILM COATED ORAL at 09:34

## 2021-07-21 RX ADMIN — CALCIUM 500 MG: 500 TABLET ORAL at 09:33

## 2021-07-21 RX ADMIN — SODIUM CHLORIDE, PRESERVATIVE FREE 10 ML: 5 INJECTION INTRAVENOUS at 09:34

## 2021-07-21 RX ADMIN — Medication 2000 UNITS: at 09:33

## 2021-07-21 RX ADMIN — TROSPIUM CHLORIDE 20 MG: 20 TABLET, FILM COATED ORAL at 09:34

## 2021-07-21 RX ADMIN — ASPIRIN 81 MG: 81 TABLET, COATED ORAL at 09:33

## 2021-07-21 RX ADMIN — METOPROLOL TARTRATE 50 MG: 50 TABLET, FILM COATED ORAL at 09:33

## 2021-07-21 RX ADMIN — CLOPIDOGREL BISULFATE 75 MG: 75 TABLET ORAL at 09:33

## 2021-07-21 RX ADMIN — NIFEDIPINE 30 MG: 30 TABLET, EXTENDED RELEASE ORAL at 09:33

## 2021-07-21 NOTE — CARE COORDINATION
Date / Time of Evaluation: 7/21/2021 9:52 AM    Stroke Transition Readiness Assessment Completed by: Karyle Clever, RN    Current PCP:  Kya Rosado MD  PCP verified? yes    Initial Assessment Completed at bedside with:  Patient and patient's daughterMoreno    Emergency Contacts:  Extended Emergency Contact Information  Primary Emergency Contact: Tea Hernandez [address] Phone: [phone]  Relation: Child  Secondary Emergency Contact: NitzaConrad   [address] Phone: [phone]  Relation: Child    Advance Directives: Code Status:  DNR-CC    Financial:  Payor: MEDICARE / Plan: MEDICARE PART A AND B / Product Type: *No Product type* /       Ask the following 3 questions of the patient and/or primary caregiver: On a scale of 1 to 10, with 10 indicating completely educated, what is your overall rating on how well you were educated about your stroke diagnosis? (answer will be a number)   7        Can you tell me what your stroke risk factors are? yes      Can you tell me the signs and symptoms of a stroke? yes      Transition Plan:  Plan to discharge home. Patient lives independently with support of daughter and son. Encouraged patient to consider Home Health, but declines. Informed patient that if she wishes to use Home Health services at a later time, she can call her PCP. Patient and daughter voiced understanding. Barriers to Stroke Risk Management:  None noted      Additional CM/SW Notes: Stroke Treatment and Prevention information provided to patient.             Karyle Clever, RN  [facility]  Ph:  [phone]   Fax: [phone]

## 2021-07-21 NOTE — TELEPHONE ENCOUNTER
Hendricks Councilman requests that a nurse return their call. The best time to reach her is Anytime. Thank you. Micah Vaughan with [facility] called to schedule a HFU with dr Rachele Castillo. There is no availability. Please call Yusef Jung back at [phone] with the appt. Patient is being discharged today 07/21/21; admitted for TIA; covid negative.

## 2021-07-21 NOTE — DISCHARGE SUMMARY
Discharge Summary      Date:7/21/2021        Patient Name:Azalia Goddard     YOB: 1926     Age:89 y.o. Admit Date:7/19/2021   Admission Condition:fair   Discharged Condition:stable  Discharge Date: 07/21/21       Discharge Diagnoses   Active Problems:    Acute cerebrovascular accident (CVA) [facility])    Palliative care patient  Resolved Problems:    * No resolved hospital problems. [facility] Stay   Narrative of Hospital Course:     70-year-old lady with history of arthritis, chronic kidney disease stage II, hypertension, movement disorder, presents to the hospital with concerns of right-sided weakness and aphasia. In the ED blood pressure was noted to be elevated in the 839 systolic over 55N, labs unremarkable, troponin negative, CT head with chronic lacunar infarct in the right cerebellum. Seen by neurology, carotid ultrasound less than 50% stenosis, echocardiogram noted. Patient already on aspirin, initiated on Plavix 75 mg daily. Patient was also noted to have urinary tract infection which was given ceftriaxone in house and cefdinir on discharge to finish course outpt.          Physical Exam  General: Alert, well-developed, no acute distress lying comfortably in bed. HEENT: Atraumatic normocephalic, range of motion normal  Cardiac: Normal S1, with loud S2, + systolic murmur  Respiratory: Effort normal, breath sounds normal, clear To auscultation bilaterally  Abdomen: Soft, positive bowel sounds in all quadrants, no distention, nontender to palpation  MSK/extremities: no tenderness, no edema, no deformity, moves all extremities  Skin: Warm, no erythema noted. Psych: Affect normal and good eye contact, behavioral normal, thought content normal and judgment normal  Neurological: No focal deficits, alert and conversant, no formal disorientation noted.  No sensory deficits, no abnormal coordination.  Did not assess gait.         Consultants:   IP CONSULT TO NEUROLOGY  PALLIATIVE CARE EVAL    Time Spent on Discharge:  40 minutes were spent in patient examination, evaluation, counseling as well as medication reconciliation, prescriptions for required medications, discharge plan and follow up. Surgeries/Procedures Performed:  NONE      Significant Diagnostic Studies:   Recent Labs:  CBC:   Lab Results   Component Value Date    WBC 8.7 07/20/2021    RBC 4.38 07/20/2021    HGB 13.5 07/20/2021    HGB 13.7 02/17/2011    HCT 41.8 07/20/2021    HCT 43.3 02/17/2011    MCV 95.4 07/20/2021    MCH 30.8 07/20/2021    MCHC 32.3 07/20/2021    RDW 13.2 07/20/2021     07/20/2021     02/17/2011     BMP:    Lab Results   Component Value Date    GLUCOSE 91 07/20/2021     07/20/2021     03/01/2011    K 4.3 07/20/2021    K 3.8 03/01/2011     07/20/2021     03/01/2011    CO2 31 07/20/2021    ANIONGAP 8 07/20/2021    BUN 26 07/20/2021    CREATININE 0.8 07/20/2021    CREATININE 0.7 03/01/2011    CALCIUM 9.5 07/20/2021    LABGLOM >60 07/20/2021    GFRAA >59 07/20/2021       Radiology Last 7 Days:  CT HEAD WO CONTRAST    Result Date: 7/19/2021  1. No intracranial hemorrhage or acute findings. MRI is more sensitive for acute stroke. 2. Diffuse atrophy and chronic small vessel white matter ischemic changes. 3. Probable small sub-5 mm focal chronic lacunar infarct in the right cerebellum, stable. Signed by Dr Emani Bear. [place]    XR CHEST PORTABLE    Result Date: 7/19/2021  1. . Increased interstitial markings with scarring or atelectasis in the lung bases. There is some more confluent disease within the right lung including the mid and lower lung zone perhaps representing a superimposed infiltrate. Signed by Dr Ivana Charles      Discharge Plan   Disposition: Home    Provider Follow-Up:   No follow-up provider specified.        Patient Instructions   Diet: cardiac diet    Activity: activity as tolerated      Discharge Medications         Medication List      START taking these medications cefdinir 300 MG capsule  Commonly known as: OMNICEF  Take 1 capsule by mouth 2 times daily for 3 days     clopidogrel 75 MG tablet  Commonly known as: PLAVIX  Take 1 tablet by mouth daily  Start taking on: July 22, 2021     NIFEdipine 30 MG extended release tablet  Commonly known as: ADALAT CC  Take 1 tablet by mouth daily  Start taking on: July 22, 2021        CHANGE how you take these medications    montelukast 10 MG tablet  Commonly known as: SINGULAIR  TAKE ONE TABLET BY MOUTH DAILY  What changed: See the new instructions.         CONTINUE taking these medications    aspirin 81 MG tablet  Take 1 tablet by mouth daily     bisoprolol-hydroCHLOROthiazide 2.5-6.25 MG per tablet  Commonly known as: ZIAC  TAKE 1 TABLET BY MOUTH DAILY     calcium carbonate 500 MG Tabs tablet  Commonly known as: OSCAL     celecoxib 200 MG capsule  Commonly known as: CELEBREX  TAKE 1 CAPSULE BY MOUTH DAILY     cetirizine 10 MG tablet  Commonly known as: ZYRTEC     Florastor 250 MG capsule  Generic drug: saccharomyces boulardii     fluticasone 50 MCG/ACT nasal spray  Commonly known as: FLONASE  USE 2 SPRAY IN EACH NOSTRIL DAILY AS NEEDED     melatonin 5 MG Tabs tablet     metoprolol tartrate 50 MG tablet  Commonly known as: LOPRESSOR  TAKE 1 TABLET TWICE DAILY     solifenacin 10 MG tablet  Commonly known as: VESICARE  TAKE 1 TABLET BY MOUTH EVERY MORNING FOR BLADDER     Tylenol PM Extra Strength  MG tablet  Generic drug: diphenhydrAMINE-APAP (sleep)     Vitamin D3 50 MCG (2000 UT) Caps           Where to Get Your Medications      These medications were sent to [id number] [facility], [address] [facility]  [id number] [facility] [address] [phone] - F [phone]  [address]    Phone: [phone]   · cefdinir 300 MG capsule  · clopidogrel 75 MG tablet  · NIFEdipine 30 MG extended release tablet         Electronically signed by Teresa Whitt MD on 7/21/21 at 1:04 PM CDT

## 2021-07-22 ENCOUNTER — CARE COORDINATION (OUTPATIENT)
Dept: CASE MANAGEMENT | Age: 86
End: 2021-07-22

## 2021-07-22 NOTE — CARE COORDINATION
[facility] Transitions Initial Follow Up Call    Call within 2 business days of discharge: Yes    Patient: Zacarias Easley Patient : 10/14/1926   MRN: [id number]  Reason for Admission:   Discharge Date: 21 RARS: Readmission Risk Score: 9      Last Discharge [facility]       Complaint Diagnosis Description Type Department Provider    21 Transient Ischemic Attack Stroke determined by clinical assessment ([address] . .. ED to Hosp-Admission (Discharged) (ADMITTED) Iman Ramírez MD; Merly Stark .. Spoke with: Eneida   The patient has the following advanced directives on file:  Advance Directives     Power of [facility] ACP-Advance Directive ACP-Power of     Not on File Filed on 17 Filed Not on File          The patient has appointed the following active healthcare agents:    Primary Decision Maker: Cesilia Rajan  Child - [phone]    Primary Decision Maker: Conrad Goddard - Child - [phone]    Transitions of Care Initial Call      Challenges to be reviewed by the provider   Additional needs identified to be addressed with provider: No  none             Method of communication with provider : none      Advance Care Planning:   Does patient have an Advance Directive: reviewed and current. Was this a readmission? No  Patient stated reason for admission: CVA  Patients top risk factors for readmission: functional cognitive ability, functional physical ability, lack of knowledge about disease, medical condition-CVA and medication management    Care Transition Nurse (CTN) contacted the patient by telephone to perform post hospital discharge assessment. Verified name and  with patient as identifiers. Provided introduction to self, and explanation of the CTN role. CTN reviewed discharge instructions, medical action plan and red flags with patient who verbalized understanding.  Patient given an opportunity to ask questions and does not have any further questions or concerns at this time. Were discharge instructions available to patient? NO. Reviewed appropriate site of care based on symptoms and resources available to patient including: PCP. The patient agrees to contact the PCP office for questions related to their healthcare. Medication reconciliation was not performed with patient, children set up meds for her. Covid Risk Education     Educated patient about risk for severe COVID-19 due to risk factors according to CDC guidelines. CTN reviewed discharge instructions, medical action plan and red flag symptoms with the patient who verbalized understanding. Discussed COVID vaccination status: Yes. Education provided on COVID-19 vaccination as appropriate. Discussed exposure protocols and quarantine with CDC Guidelines. Patient was given an opportunity to verbalize any questions and concerns and agrees to contact CTN or health care provider for questions related to their healthcare. Reviewed and educated patient on any new and changed medications related to discharge diagnosis. Was patient discharged with a pulse oximeter? No Discussed and confirmed pulse oximeter discharge instructions and when to notify provider or seek emergency care. CTN provided contact information. Plan for follow-up call in 5-7 days based on severity of symptoms and risk factors.   Plan for next call: mobility, appetite, med changes, HH          Care Transitions 24 Hour Call    Do you have any ongoing symptoms?: No  Do you have a copy of your discharge instructions?: Yes  Do you have all of your prescriptions and are they filled?: Yes  Have you been contacted by a [address]?: No  Have you scheduled your follow up appointment?: Yes  How are you going to get to your appointment?: Car - family or friend to transport  Were you discharged with any Home Care or Post Acute Services: No  Do you feel like you have everything you need to keep you well at home?: Yes  Care Transitions Interventions         Follow Up : Spoke with patient today for initial call after discharge from [facility]. She says she is doing well. Says she has her medications, children set them up for her last night. She could not review stating her children keep track of that. Says she has her HFU with Dr. Rupa Bautista on 7/27 and is aware. She said she does have right side residual from stroke. Says she is weak, CTN inquired about getting home health for her, but she says she will talk to Dr[facility] She says she feels she has everything she needs at home. Says she is eating and drinking ok, no issues with bowels or bladder. She says she is able to get around the house, uses a walker, says she can get where she needs to go. Discussed CTN calls and follow up and she is accepting. She has LW and PHCDM listed. No problems or concern she says. Will follow up at a later time.    Future Appointments   Date Time Provider Art Jones   7/27/2021  3:30 PM MD NANDINI Jensen   8/19/2021 10:00 AM MD NANDINI Jensen RN

## 2021-07-27 ENCOUNTER — OFFICE VISIT (OUTPATIENT)
Dept: INTERNAL MEDICINE | Age: 86
End: 2021-07-27
Payer: MEDICARE

## 2021-07-27 VITALS
DIASTOLIC BLOOD PRESSURE: 60 MMHG | HEIGHT: 64 IN | SYSTOLIC BLOOD PRESSURE: 110 MMHG | WEIGHT: 125 LBS | BODY MASS INDEX: 21.34 KG/M2 | OXYGEN SATURATION: 99 % | HEART RATE: 50 BPM

## 2021-07-27 DIAGNOSIS — N32.81 OVERACTIVE BLADDER: ICD-10-CM

## 2021-07-27 DIAGNOSIS — I63.9 CEREBROVASCULAR ACCIDENT (CVA), UNSPECIFIED MECHANISM (HCC): Primary | ICD-10-CM

## 2021-07-27 DIAGNOSIS — M15.9 PRIMARY OSTEOARTHRITIS INVOLVING MULTIPLE JOINTS: ICD-10-CM

## 2021-07-27 DIAGNOSIS — I10 ESSENTIAL HYPERTENSION: ICD-10-CM

## 2021-07-27 DIAGNOSIS — N18.32 STAGE 3B CHRONIC KIDNEY DISEASE (HCC): ICD-10-CM

## 2021-07-27 PROCEDURE — 99495 TRANSJ CARE MGMT MOD F2F 14D: CPT | Performed by: INTERNAL MEDICINE

## 2021-07-27 PROCEDURE — 1111F DSCHRG MED/CURRENT MED MERGE: CPT | Performed by: INTERNAL MEDICINE

## 2021-07-27 ASSESSMENT — PATIENT HEALTH QUESTIONNAIRE - PHQ9
1. LITTLE INTEREST OR PLEASURE IN DOING THINGS: 0
SUM OF ALL RESPONSES TO PHQ QUESTIONS 1-9: 0
SUM OF ALL RESPONSES TO PHQ9 QUESTIONS 1 & 2: 0
SUM OF ALL RESPONSES TO PHQ QUESTIONS 1-9: 0
SUM OF ALL RESPONSES TO PHQ QUESTIONS 1-9: 0
2. FEELING DOWN, DEPRESSED OR HOPELESS: 0

## 2021-07-27 NOTE — PROGRESS NOTES
Post-Discharge Transitional Care Management Services or Hospital Follow Up      Kelvin Hayden   YOB: 1926    Date of Office Visit:  7/27/2021  Date of Hospital Admission: 7/19/21  Date of Hospital Discharge: 7/21/21  Readmission Risk Score(high >=14%.  Medium >=10%):Readmission Risk Score: 9      Care management risk score Rising risk (score 2-5) and Complex Care (Scores >=6): 1     Non face to face  following discharge, date last encounter closed (first attempt may have been earlier): *No documented post hospital discharge outreach found in the last 14 days *No documented post hospital discharge outreach found in the last 14 days    Call initiated 2 business days of discharge: *No response recorded in the last 14 days     Patient Active Problem List   Diagnosis    Impacted cerumen of right ear    Hypertension    Overactive bladder    Primary osteoarthritis involving multiple joints    Unsteady gait    Ingrown toenail without infection    Chronic kidney disease, stage III (moderate) [facility] [address] [address]    Pseudophakia    Tear film insufficiency    Acute cerebrovascular accident (CVA) ([facility] [address] [address]    Palliative care patient       No Known Allergies    Medications listed as ordered at the time of discharge from [facility], [address] [place] [address] [address] Medication Instructions CHANCE:    Printed on:08/11/21 1361   Medication Information                      bisoprolol-hydroCHLOROthiazide (ZIAC) 2.5-6.25 MG per tablet  TAKE 1 TABLET BY MOUTH DAILY             calcium carbonate (OSCAL) 500 MG TABS tablet  Take 500 mg by mouth daily             cetirizine (ZYRTEC) 10 MG tablet  Take 10 mg by mouth daily             Cholecalciferol (VITAMIN D3) 2000 UNITS CAPS  Take by mouth             clopidogrel (PLAVIX) 75 MG tablet  Take 1 tablet by mouth daily             diphenhydrAMINE-APAP, sleep, (TYLENOL PM EXTRA STRENGTH)  MG tablet  Take 1 tablet by mouth nightly as needed for Sleep             fluticasone (FLONASE) 50 MCG/ACT nasal spray  USE 2 SPRAY IN EACH NOSTRIL DAILY AS NEEDED             melatonin 5 MG TABS tablet  Take 5 mg by mouth nightly              metoprolol tartrate (LOPRESSOR) 50 MG tablet  TAKE 1 TABLET TWICE DAILY             montelukast (SINGULAIR) 10 MG tablet  TAKE ONE TABLET BY MOUTH DAILY             saccharomyces boulardii (FLORASTOR) 250 MG capsule  Take 250 mg by mouth 2 times daily             solifenacin (VESICARE) 10 MG tablet  TAKE 1 TABLET BY MOUTH EVERY MORNING FOR BLADDER                   Medications marked \"taking\" at this time  No outpatient medications have been marked as taking for the 7/27/21 encounter (Office Visit) with Jt Barger MD.        Medications patient taking as of now reconciled against medications ordered at time of hospital discharge: yes    Chief Complaint   Patient presents with    Follow-Up from Hospital     TIA       HPI this is a moderate level TCM visit to follow-up recent stroke. Patient is doing better recently admitted to [facility] 7 19-7 21 with a stroke right-sided weakness aphasia. She is back home living alone very supportive and close family regaining her strength. No definitive cause of stroke was determined no intracranial hemorrhage diffuse atrophy chronic small vessel white matter ischemic change probable small sub-5 mm focus of chronic lacunar infarct in the right cerebellum that was stable and acute stroke was not seen. But clinically she had a stroke. No atrial fibs seen carotid Dopplers were stable. Inpatient course: Discharge summary reviewed- see chart. Interval history/Current status: 89 but does amazingly well gradually improving her strength  Review of Systems   Neurological: Positive for facial asymmetry and weakness. Some change in her speech       Vitals:    07/27/21 1540   BP: 110/60   Pulse: 50   SpO2: 99%   Weight: 125 lb (56.7 kg)   Height: 5' 4\" (1.626 m)     Body mass index is 21.46 kg/m².    Wt Readings from Last 3 Encounters:   07/27/21 125 lb (56.7 kg)   07/19/21 126 lb (57.2 kg)   02/08/21 129 lb (58.5 kg)     BP Readings from Last 3 Encounters:   07/27/21 110/60   07/21/21 130/68   02/08/21 129/72       Physical Exam  Some mild facial droop change in speech sclera anicteric heart S1 is 2 lungs clear extremities without edema slight weakness      Assessment/Plan:  1. Cerebrovascular accident (CVA), unspecified mechanism ([facility] [address]  Plavix added we would discontinue the aspirin and just take the Plavix I would also suggest she discontinue her Celebrex watch closely with Tylenol if that is not adequate let us know when to minimize risk of bleeding but we also want to prevent recurrent stroke definitive etiology was not determined. - VT DISCHARGE MEDS RECONCILED W/ CURRENT OUTPATIENT MED LIST    2. Essential hypertension  We reviewed the records overall blood pressure good does not appear to be the cause of stroke    3. Stage 3b chronic kidney disease (HCC)  Stable CKD 3 follow    4. Overactive bladder  Continue Vesicare she is done very well with Vesicare for a long time once it was changed in the hospital to oxybutynin and she was very symptomatic manages very well with Vesicare continue    5.  Primary osteoarthritis involving multiple joints  Again I would rather try to see if Tylenol with work instead of Celebrex with her now being on Plavix if not we will go back to Celebrex but may be try a lower dose risk-benefit of medications plan of care will work with this and they will let us know if it is not manging things well        Medical Decision Making: moderate

## 2021-07-29 ENCOUNTER — CARE COORDINATION (OUTPATIENT)
Dept: CASE MANAGEMENT | Age: 86
End: 2021-07-29

## 2021-07-29 NOTE — CARE COORDINATION
[facility] Transitions Follow Up Call    2021    Patient: Joe Wills  Patient : 10/14/1926   MRN: [id number]  Reason for Admission:   Discharge Date: 21 RARS: Readmission Risk Score: 9         Spoke with: Guy 23 Transitions Subsequent and Final Call    Subsequent and Final Calls  Do you have any ongoing symptoms?: No  Have your medications changed?: No  Do you have any questions related to your medications?: No  Do you currently have any active services?: No  Do you have any needs or concerns that I can assist you with?: No  Identified Barriers: None  Care Transitions Interventions  Other Interventions: Follow Up ; Spoke with patient today for follow up call. She says she is much better. Says she was out at the beauty shop getting her hair done today and that made her feel better. She says all is well. She has had her HFU with Dr. Demond Cervantes, and they have changed some medications, she says her Tylenol PM was stopped, and she is still sleeping good. They also stopped her Celebrex, ASA, and Nifedipine. She says no problems or concerns. Says she is fairly steady, and progressively getting better. [facility]. Will follow up with her at a later time.    Future Appointments   Date Time Provider Atr Jones   2021 10:00 AM MD NANDINI [place]       Jose Kruse RN

## 2021-07-30 NOTE — PROGRESS NOTES
Physician Progress Note      PATIENT:               Payal Sands  CSN #:                  [id number]  :                       10/14/1926  ADMIT DATE:       2021 10:48 AM  DISCH DATE:        2021 12:08 PM  RESPONDING  PROVIDER #:        Ian Leo MD          QUERY TEXT:    Patient admitted with right sided weakness and aphasia with concerns for CVA. Noted documentation of  Head CT on  , shows chronic lacunar infarct in the   right cerebellum. In order to support the diagnosis of CVA , please include   additional clinical indicators in your documentation. Or please document if   the diagnosis of CVA  has been ruled out after further study. The medical record reflects the following:  Risk Factors: CKD II,  HTN  Clinical Indicators: CT head - Probable small sub-5 mm focal chronic lacunar   infarct in the right  cerebellum. Right sided weakness and aphasia. Neurology consult diagnosed CVA. Treatment: Neurology consult, CT head, Neuro checks, Echo, labs, PT/OT eval,   Aspirin. Plavix    Thank you    Kizzy Ramos RN, BSN [facility]  [phone]  Options provided:  -- CVA present as evidenced by, Please document evidence.   -- CVA  was ruled out  -- Other - I will add my own diagnosis  -- Disagree - Not applicable / Not valid  -- Disagree - Clinically unable to determine / Unknown  -- Refer to Clinical Documentation Reviewer    PROVIDER RESPONSE TEXT:    CVA is present as evidenced by Clinical exam    Query created by: Javier Brooks on 2021 8:35 AM      Electronically signed by:  Ian Leo MD 2021 2:35 PM

## 2021-08-03 NOTE — TELEPHONE ENCOUNTER
Faustino Weaver 39 received a referral but there are not orders on it   Which disciplines/ services  are needed  ?

## 2021-08-04 NOTE — TELEPHONE ENCOUNTER
[address] [address] PT eval completed and PT recommending PT visits 2 X wk for 3 wks  for trunk/core and B LE strengthening exercises; bed mobility, transfer, gait, and stair/ramp training; balance activities; home safety instruction    Please advise if approved

## 2021-08-05 NOTE — CARE COORDINATION
[facility] Transitions Follow Up Call    2021    Patient: Marianne Myers  Patient : 10/14/1926   MRN: [id number]  Reason for Admission:   Discharge Date: 21 RARS: Readmission Risk Score: 9         Spoke with: [facility] [place] [address]  Transitions Subsequent and Final Call    Subsequent and Final Calls  Do you have any ongoing symptoms?: No  Have your medications changed?: No  Do you have any questions related to your medications?: No  Do you currently have any active services?: Yes  Are you currently active with any services?: Home Health  Do you have any needs or concerns that I can assist you with?: No  Identified Barriers: None  Care Transitions Interventions  Other Interventions: Follow Up :  Spoke with patient today for follow up phone call. She says she is feeling better. She has had her HFU with PCP. It is noted that patient now has Home Health ordered for her. She says Skilled nurse was out yesterday with therapy, and someone else is coming today. She said she is eating and drinking ok. She was having difficulty hearing CTN today, said she had to be at the podiatrist later this afternoon, and said she had to go. CTN will call back at a later time.    Future Appointments   Date Time Provider Art Jones   2021 10:00 AM MD NANDINI Rico [facility] Price RN

## 2021-08-09 NOTE — TELEPHONE ENCOUNTER
Pt son would like [facility] to obtain labs for this pt that were needed before her next visit of 8/19 per his report   Is it ok for [facility] to obtain labs and If so what is needed ?           Also OT did an eval and education provided on wrist splints for night time use for carpal tunnel  - no further needs for OT

## 2021-08-12 NOTE — TELEPHONE ENCOUNTER
Well pt had not actually eaten anything even tho it was near noon so they were able to obtain those today after all  - pt had been out to [facility] when nurse arrived and she had to wait on her so she thought she had eaten but she had not so she was able to obtatin today after all      Thanks

## 2021-08-12 NOTE — CARE COORDINATION
[facility] Transitions Follow Up Call    2021    Patient: Roland Conner  Patient : 10/14/1926   MRN: [id number]  Reason for Admission:   Discharge Date: 21 RARS: Readmission Risk Score: 9         Spoke with: [address] [place] [address] [facility] Transitions Subsequent and Final Call    Subsequent and Final Calls  Do you have any ongoing symptoms?: No  Have your medications changed?: No  Do you have any questions related to your medications?: No  Do you currently have any active services?: Yes  Are you currently active with any services?: Home Health  Do you have any needs or concerns that I can assist you with?: No  Identified Barriers: None  Care Transitions Interventions  Other Interventions: Follow Up :  Spoke with patient today for follow up call. She says Home Health has been out to draw her blood for her appointment with Dr. Meena Tenorio later next week. She says she is eating and drinking fine. She is more [place] than usual today, and has much difficulty hearing CTN and answering questions. She said she was fine, had nothing she needs, and did not need further follow up. CTN will discharge from services today.    Future Appointments   Date Time Provider Art Jones   2021 10:00 AM Oma Spatz, MD [facility] Carson RN

## 2021-08-12 NOTE — TELEPHONE ENCOUNTER
Pt was not fasting at [facility] visit - it is ok for the labs to be obtained the first of next week since they are for the appt on Thursday  ?

## 2021-08-19 NOTE — PROGRESS NOTES
Chief Complaint   Patient presents with    Medicare AWV     states she is doing pretty good       HPI: Patient is here today for Medicare annual wellness visit and to follow-up medical issues hypertension chronic kidney disease recent stroke without finding on CT but clinically he had a stroke and MRI has not been done would not change plan of care patient feels okay otherwise she is not having headache or chest pain or dyspnea she is more fragile and has more fatigue weaker a little change in her speech. Past Medical History:   Diagnosis Date    Arthritis     Cerebral artery occlusion with cerebral infarction [facility])     possible this admit     Chronic kidney disease     Essential hypertension 08/14/2017    Hypertension     Impacted cerumen of right ear 08/14/2017    Movement disorder     Palliative care patient 07/20/2021       Past Surgical History:   Procedure Laterality Date    APPENDECTOMY      BREAST BIOPSY Bilateral     CHOLECYSTECTOMY      HIP SURGERY Bilateral     TONSILLECTOMY         No family history on file. Social History     Socioeconomic History    Marital status:      Spouse name: Not on file    Number of children: Not on file    Years of education: Not on file    Highest education level: Not on file   Occupational History    Not on file   Tobacco Use    Smoking status: Never Smoker    Smokeless tobacco: Never Used   Substance and Sexual Activity    Alcohol use:  Yes     Alcohol/week: 7.0 standard drinks     Types: 7 Glasses of wine per week    Drug use: No    Sexual activity: Never   Other Topics Concern    Not on file   Social History Narrative    Not on file     Social Determinants of Health     Financial Resource Strain: Low Risk     Difficulty of Paying Living Expenses: Not very hard   Food Insecurity: No Food Insecurity    Worried About Running Out of Food in the Last Year: Never true    Otilio of Food in the Last Year: Never true   Transportation Needs:     Lack of Transportation (Medical):  Lack of Transportation (Non-Medical):    Physical Activity:     Days of Exercise per Week:     Minutes of Exercise per Session:    Stress:     Feeling of Stress :    Social Connections:     Frequency of Communication with Friends and Family:     Frequency of Social Gatherings with Friends and Family:     Attends Zoroastrianism Services:     Active Member of Clubs or Organizations:     Attends Club or Organization Meetings:     Marital Status:    Intimate Partner Violence:     Fear of Current or Ex-Partner:     Emotionally Abused:     Physically Abused:     Sexually Abused:        No Known Allergies    Current Outpatient Medications   Medication Sig Dispense Refill    clopidogrel (PLAVIX) 75 MG tablet Take 1 tablet by mouth daily 30 tablet 3    metoprolol tartrate (LOPRESSOR) 50 MG tablet TAKE 1 TABLET TWICE DAILY 180 tablet 3    montelukast (SINGULAIR) 10 MG tablet TAKE ONE TABLET BY MOUTH DAILY (Patient taking differently: nightly TAKE ONE TABLET BY MOUTH DAILY) 90 tablet 3    fluticasone (FLONASE) 50 MCG/ACT nasal spray USE 2 SPRAY IN EACH NOSTRIL DAILY AS NEEDED 16 g 3    bisoprolol-hydroCHLOROthiazide (ZIAC) 2.5-6.25 MG per tablet TAKE 1 TABLET BY MOUTH DAILY 90 tablet 3    solifenacin (VESICARE) 10 MG tablet TAKE 1 TABLET BY MOUTH EVERY MORNING FOR BLADDER 90 tablet 3    calcium carbonate (OSCAL) 500 MG TABS tablet Take 500 mg by mouth daily      saccharomyces boulardii (FLORASTOR) 250 MG capsule Take 250 mg by mouth 2 times daily      Cholecalciferol (VITAMIN D3) 2000 UNITS CAPS Take 1,000 Units by mouth daily       melatonin 5 MG TABS tablet Take 5 mg by mouth nightly        No current facility-administered medications for this visit. Review of Systems   Constitutional: Positive for fatigue. Negative for chills and fever. HENT: Positive for hearing loss. Negative for congestion and sinus pressure.     Eyes: Negative for discharge and redness. Respiratory: Negative for cough. Cardiovascular: Negative for chest pain, palpitations and leg swelling. Gastrointestinal: Negative for abdominal distention and abdominal pain. Genitourinary: Positive for frequency and urgency. Negative for dysuria. Musculoskeletal: Positive for arthralgias and gait problem. Negative for back pain. Skin: Negative for rash and wound. Neurological: Negative for dizziness, light-headedness and headaches. Psychiatric/Behavioral: Negative for dysphoric mood and sleep disturbance. The patient is not nervous/anxious. /74   Pulse 56   Ht 5' 4\" (1.626 m)   Wt 124 lb (56.2 kg)   SpO2 98%   BMI 21.28 kg/m²   BP Readings from Last 7 Encounters:   08/19/21 126/74   07/27/21 110/60   07/21/21 130/68   02/08/21 129/72   07/20/20 126/64   10/29/19 120/62   07/13/19 (!) 142/73     Wt Readings from Last 7 Encounters:   08/19/21 124 lb (56.2 kg)   07/27/21 125 lb (56.7 kg)   07/19/21 126 lb (57.2 kg)   02/08/21 129 lb (58.5 kg)   07/20/20 130 lb (59 kg)   10/29/19 137 lb (62.1 kg)   07/13/19 139 lb (63 kg)     BMI Readings from Last 7 Encounters:   08/19/21 21.28 kg/m²   07/27/21 21.46 kg/m²   07/19/21 21.63 kg/m²   02/08/21 22.14 kg/m²   07/20/20 22.31 kg/m²   10/29/19 23.52 kg/m²   07/13/19 23.86 kg/m²     Resp Readings from Last 7 Encounters:   07/21/21 18   07/13/19 16   07/07/19 15   04/19/18 18   01/28/17 18       Physical Exam  Constitutional:       General: She is not in acute distress. Appearance: Normal appearance. She is well-developed. HENT:      Right Ear: External ear normal. Tympanic membrane is not injected. Left Ear: External ear normal. Tympanic membrane is not injected. Mouth/Throat:      Pharynx: No oropharyngeal exudate. Eyes:      General: No scleral icterus. Conjunctiva/sclera: Conjunctivae normal.   Neck:      Thyroid: No thyroid mass or thyromegaly. Vascular: No carotid bruit.    Cardiovascular:      Rate and Rhythm: Normal rate and regular rhythm. Heart sounds: S1 normal and S2 normal. No murmur heard. No S3 or S4 sounds. Pulmonary:      Effort: Pulmonary effort is normal. No respiratory distress. Breath sounds: Normal breath sounds. No wheezing or rales. Abdominal:      General: Bowel sounds are normal. There is no distension. Palpations: Abdomen is soft. There is no mass. Tenderness: There is no abdominal tenderness. Musculoskeletal:      Cervical back: Neck supple. Comments: oa changes   Lymphadenopathy:      Cervical: No cervical adenopathy. Upper Body:      Right upper body: No supraclavicular adenopathy. Left upper body: No supraclavicular adenopathy. Skin:     Findings: No rash. Neurological:      Mental Status: She is alert and oriented to person, place, and time. Mental status is at baseline. Cranial Nerves: No cranial nerve deficit.    Psychiatric:         Mood and Affect: Mood normal.         Results for orders placed or performed during the hospital encounter of 07/19/21   Culture, Urine    Specimen: Urine, clean catch   Result Value Ref Range    Urine Culture, Routine >100,000 CFU/ml (A)     Organism Escherichia coli (A)     Urine Culture, Routine Heavy growth        Susceptibility    Escherichia coli - BACTERIAL SUSCEPTIBILITY PANEL BY MYLES     ampicillin <=2 Sensitive mcg/mL     aztreonam <=1 Sensitive mcg/mL     ceFAZolin <=4 Sensitive mcg/mL     cefepime <=1 Sensitive mcg/mL     cefTRIAXone <=1 Sensitive mcg/mL     ertapenem <=0.5 Sensitive mcg/mL     gentamicin <=1 Sensitive mcg/mL     levofloxacin <=0.12 Sensitive mcg/mL     meropenem <=0.25 Sensitive mcg/mL     nitrofurantoin <=16 Sensitive mcg/mL     piperacillin-tazobactam <=4 Sensitive mcg/mL     trimethoprim-sulfamethoxazole <=20 Sensitive mcg/mL   COVID-19, Rapid    Specimen: Nasopharyngeal Swab   Result Value Ref Range    SARS-CoV-2, NAAT Not Detected Not Detected   Comprehensive Metabolic Leukocyte Esterase, Urine LARGE (A) Negative   Troponin   Result Value Ref Range    Troponin <0.01 0.00 - 0.03 ng/mL   Microscopic Urinalysis   Result Value Ref Range    Bacteria, UA 4+ (A) None Seen /HPF    Crystals, UA NEG (A) None Seen /HPF    Hyaline Casts, UA 0 0 - 8 /HPF    WBC,  (H) 0 - 5 /HPF    RBC, UA 4 0 - 4 /HPF    Epithelial Cells, UA 0 0 - 5 /HPF   Lipid Panel   Result Value Ref Range    Cholesterol, Total 118 (L) 160 - 199 mg/dL    Triglycerides 70 0 - 149 mg/dL    HDL 60 (L) 65 - 121 mg/dL    LDL Calculated 44 <100 mg/dL   Hemoglobin A1C   Result Value Ref Range    Hemoglobin A1C 5.3 4.0 - 6.0 %   TSH WITH REFLEX TO FT4   Result Value Ref Range    TSH Reflex FT4 5.12 0.35 - 5.50 uIU/mL   Basic Metabolic Panel w/ Reflex to MG   Result Value Ref Range    Sodium 140 136 - 145 mmol/L    Potassium reflex Magnesium 4.3 3.5 - 5.0 mmol/L    Chloride 101 98 - 111 mmol/L    CO2 31 (H) 22 - 29 mmol/L    Anion Gap 8 7 - 19 mmol/L    Glucose 91 74 - 109 mg/dL    BUN 26 (H) 8 - 23 mg/dL    CREATININE 0.8 0.5 - 0.9 mg/dL    GFR Non-African American >60 >60    GFR African American >59 >59    Calcium 9.5 8.2 - 9.6 mg/dL   CBC   Result Value Ref Range    WBC 8.7 4.8 - 10.8 K/uL    RBC 4.38 4.20 - 5.40 M/uL    Hemoglobin 13.5 12.0 - 16.0 g/dL    Hematocrit 41.8 37.0 - 47.0 %    MCV 95.4 81.0 - 99.0 fL    MCH 30.8 27.0 - 31.0 pg    MCHC 32.3 (L) 33.0 - 37.0 g/dL    RDW 13.2 11.5 - 14.5 %    Platelets 955 937 - 410 K/uL    MPV 10.3 9.4 - 12.3 fL   T4, Free   Result Value Ref Range    T4 Free 1.35 0.93 - 1.70 ng/dL   EKG 12 Lead   Result Value Ref Range    P-R Interval 224 ms    QRS Duration 92 ms    Q-T Interval 442 ms    QTc Calculation (Bazett) 435 ms    P Axis 90 degrees    T Axis 73 degrees   ECHO Complete 2D W Doppler W Color   Result Value Ref Range    Left Ventricular Ejection Fraction 58     LVEF MODALITY ECHO        ASSESSMENT/ PLAN:  1.  Medicare annual wellness visit, subsequent  Chart, medications, labs, vaccines reviewed. Keep up to date with routine care and follow up. Call with any problems or complaints. Keep up to date with routine screening recomendations and vaccines. 2. Routine general medical examination at a health care facility  Chart, medications, labs, vaccines reviewed. Keep up to date with routine care and follow up. Call with any problems or complaints. Keep up to date with routine screening recomendations and vaccines. 3. Primary osteoarthritis involving multiple joints  Continue current care and monitoring    4. Essential hypertension  Blood pressure is in good control - follow     5. Overactive bladder  Pt dose well with vesicare                    Medicare Annual Wellness Visit  Name: Gia Dupont Date: 2021   MRN: [id number] Sex: Female   Age: 80 y.o. Ethnicity: Non- / Non    : 10/14/1926 Race: White (non-)      Santos Kay is here for Medicare AWV (states she is doing pretty good)    Screenings for behavioral, psychosocial and functional/safety risks, and cognitive dysfunction are all negative except as indicated below. These results, as well as other patient data from the [address] form, are documented in Flowsheets linked to this Encounter. No Known Allergies      Prior to Visit Medications    Medication Sig Taking?  Authorizing Provider   clopidogrel (PLAVIX) 75 MG tablet Take 1 tablet by mouth daily Yes Rajeev Yost MD   metoprolol tartrate (LOPRESSOR) 50 MG tablet TAKE 1 TABLET TWICE DAILY Yes Iker Robles MD   montelukast (SINGULAIR) 10 MG tablet TAKE ONE TABLET BY MOUTH DAILY  Patient taking differently: nightly TAKE ONE TABLET BY MOUTH DAILY Yes Iker Robles MD   fluticasone (FLONASE) 50 MCG/ACT nasal spray USE 2 SPRAY IN EACH NOSTRIL DAILY AS NEEDED Yes Iker Robles MD   bisoprolol-hydroCHLOROthiazide (ZIAC) 2.5-6.25 MG per tablet TAKE 1 TABLET BY MOUTH DAILY Yes Iker Robles MD   solifenacin (VESICARE) 10 MG tablet TAKE 1 TABLET BY MOUTH EVERY MORNING FOR BLADDER Yes Oma Spatz, MD   calcium carbonate (OSCAL) 500 MG TABS tablet Take 500 mg by mouth daily Yes Historical Provider, MD   saccharomyces boulardii (FLORASTOR) 250 MG capsule Take 250 mg by mouth 2 times daily Yes Historical Provider, MD   Cholecalciferol (VITAMIN D3) 2000 UNITS CAPS Take 1,000 Units by mouth daily  Yes Historical Provider, MD   melatonin 5 MG TABS tablet Take 5 mg by mouth nightly  Yes Historical Provider, MD   NIFEdipine (ADALAT CC) 30 MG extended release tablet Take 1 tablet by mouth daily  Tolu Forde MD   celecoxib (CELEBREX) 200 MG capsule TAKE 1 CAPSULE BY MOUTH DAILY  Oma Spatz, MD   aspirin 81 MG tablet Take 1 tablet by mouth daily  Oma Spatz, MD         Past Medical History:   Diagnosis Date    Arthritis     Cerebral artery occlusion with cerebral infarction [facility])     possible this admit     Chronic kidney disease     Essential hypertension 08/14/2017    Hypertension     Impacted cerumen of right ear 08/14/2017    Movement disorder     Palliative care patient 07/20/2021       Past Surgical History:   Procedure Laterality Date    APPENDECTOMY      BREAST BIOPSY Bilateral     CHOLECYSTECTOMY      HIP SURGERY Bilateral     TONSILLECTOMY         No family history on file. CareTeam (Including outside providers/suppliers regularly involved in providing care):   Patient Care Team:  Oma Spatz, MD as PCP - General (Internal Medicine)  Oma Spatz, MD as PCP - [facility] Empaneled Provider    Wt Readings from Last 3 Encounters:   08/19/21 124 lb (56.2 kg)   07/27/21 125 lb (56.7 kg)   07/19/21 126 lb (57.2 kg)     Vitals:    08/19/21 1018   BP: 126/74   Pulse: 56   SpO2: 98%   Weight: 124 lb (56.2 kg)   Height: 5' 4\" (1.626 m)     Body mass index is 21.28 kg/m².     Based upon direct observation of the patient, evaluation of cognition reveals no issues    Patient's complete Health Risk Assessment and screening values have been reviewed and are found in [facility]. The following problems were reviewed today and where indicated follow up appointments were made and/or referrals ordered. Positive Risk Factor Screenings with Interventions:     Fall Risk:  Timed Up and Go Test > 12 seconds? (Complete if either Fall Risk answers are Yes): (!) yes  2 or more falls in past year?: no  Fall with injury in past year?: no  Fall Risk Interventions:    · The walker at all times caution against falling family very attentive         Health Habits/Nutrition:  Health Habits/Nutrition  Do you exercise for at least 20 minutes 2-3 times per week?: (!) No  Have you lost any weight without trying in the past 3 months?: No  Do you eat only one meal per day?: No  Have you seen the dentist within the past year?: Yes  Body mass index: 21.28  Health Habits/Nutrition Interventions:  · Patient has been going back to physical therapy right now continue present management be very cautious about Covid    Hearing/Vision:  No exam data present  Hearing/Vision  Do you or your family notice any trouble with your hearing that hasn't been managed with hearing aids?: (!) Yes  Do you have difficulty driving, watching TV, or doing any of your daily activities because of your eyesight?: (!) Yes  Have you had an eye exam within the past year?: Yes  Hearing/Vision Interventions:  · No issues - keep up to date with eye exam      ADL:  ADLs  In the past 7 days, did you need help from others to perform any of the following everyday activities? Eating, dressing, grooming, bathing, toileting, or walking/balance?: (!) Walking/Balance  In the past 7 days, did you need help from others to take care of any of the following?  Laundry, housekeeping, banking/finances, shopping, telephone use, food preparation, transportation, or taking medications?: Consolidated Ananda, Shopping, Transportation  ADL Interventions:  · She is a very helpful and supportive family keep up-to-date with routine care family help her with activities    Personalized Preventive Plan   Current Health Maintenance Status  Immunization History   Administered Date(s) Administered    COVID-19, Ghosh AugustoCHITRA, 30mcg/0.3mL 01/27/2021, 02/17/2021    Influenza, High Dose (Fluzone 65 yrs and older) 10/16/2017, 10/25/2018, 10/08/2019, 10/05/2020    Pneumococcal Conjugate 13-valent ([id number]) 10/22/2015    Pneumococcal Polysaccharide ([id number]) 04/29/2019    Varicella (Varivax) 04/08/2015    Zoster Recombinant (Shingrix) 05/16/2019, 08/06/2019        Health Maintenance   Topic Date Due    DTaP/Tdap/Td vaccine (1 - Tdap) Never done    Annual Wellness Visit (AWV)  Never done    Flu vaccine (1) 09/01/2021    Potassium monitoring  07/20/2022    Creatinine monitoring  07/20/2022    Shingles Vaccine  Completed    Pneumococcal 65+ years Vaccine  Completed    COVID-19 Vaccine  Completed    Hepatitis A vaccine  Aged Out    Hepatitis B vaccine  Aged Out    Hib vaccine  Aged Out    Meningococcal (ACWY) vaccine  Aged Out     Recommendations for [employer] Due: see orders and patient instructions/AVS.  . Recommended screening schedule for the next 5-10 years is provided to the patient in written form: see Patient Roger Mahajan was seen today for medicare awv.     Diagnoses and all orders for this visit:    Medicare annual wellness visit, subsequent    Routine general medical examination at a health care facility    Primary osteoarthritis involving multiple joints    Essential hypertension    Overactive bladder

## 2021-08-30 NOTE — PATIENT INSTRUCTIONS
Personalized Preventive Plan for Janie Lopez - 8/19/2021  Medicare offers a range of preventive health benefits. Some of the tests and screenings are paid in full while other may be subject to a deductible, co-insurance, and/or copay. Some of these benefits include a comprehensive review of your medical history including lifestyle, illnesses that may run in your family, and various assessments and screenings as appropriate. After reviewing your medical record and screening and assessments performed today your provider may have ordered immunizations, labs, imaging, and/or referrals for you. A list of these orders (if applicable) as well as your Preventive Care list are included within your After Visit Summary for your review. Other Preventive Recommendations:    · A preventive eye exam performed by an eye specialist is recommended every 1-2 years to screen for glaucoma; cataracts, macular degeneration, and other eye disorders. · A preventive dental visit is recommended every 6 months. · Try to get at least 150 minutes of exercise per week or 10,000 steps per day on a pedometer . · Order or download the FREE \"Exercise & Physical Activity: Your Everyday Guide\" from [employer]. Call 2-818.275.5071 or search [employer] online. · You need 3993-7381 mg of calcium and 5249-1438 IU of vitamin D per day. It is possible to meet your calcium requirement with diet alone, but a vitamin D supplement is usually necessary to meet this goal.  · When exposed to the sun, use a sunscreen that protects against both UVA and UVB radiation with an SPF of 30 or greater. Reapply every 2 to 3 hours or after sweating, drying off with a towel, or swimming. · Always wear a seat belt when traveling in a car. Always wear a helmet when riding a bicycle or motorcycle.

## 2021-10-21 NOTE — TELEPHONE ENCOUNTER
She could go back on her Celebrex at this point 1 a day or she could take ibuprofen 1 or 2 pills either once or twice a day

## 2021-10-21 NOTE — TELEPHONE ENCOUNTER
----- Message from Florencia Henderson sent at 10/21/2021  4:12 PM CDT -----  Subject: Message to Provider    QUESTIONS  Information for Provider? Pt's daughter in law called to ask if Pt can   take Ibuprofen. Please call Shayla Boggs back at [phone]  ---------------------------------------------------------------------------  --------------  Ck JENKINS  What is the best way for the office to contact you? OK to leave message on   voicemail  Preferred Call Back Phone Number? [phone]  ---------------------------------------------------------------------------  --------------  SCRIPT ANSWERS  Relationship to Patient?  Self

## 2021-10-26 NOTE — TELEPHONE ENCOUNTER
S/w daughter-in-law, pt fell yesterday and has some abdominal which she states she had yesterday. Please advise.

## 2021-10-26 NOTE — TELEPHONE ENCOUNTER
She almost fell 2 weeks ago and was already sore across mid back area, and today she did end up in the floor. She is sore across her back about where bra would fasten. She also hit her head, but her daugter-in-law says that she seems fine, she is not confused or acting any different.

## 2021-10-27 NOTE — TELEPHONE ENCOUNTER
Call and find out more about this do they want us to possibly order home health or if she does not have a walker she needs to be using a walker but I am sure she has 1.--- be cautious and if we need see her let us know

## 2021-10-27 NOTE — TELEPHONE ENCOUNTER
I spoke with her daughter in law, but she wanted me to ask Mrs Fofana Settler about ordering HH, PT , OT and bath aid.   NO answer, I will try her again later

## 2021-11-03 NOTE — TELEPHONE ENCOUNTER
[address] [id number] PT eval completed and per PT Patient has pain, weakness, and poor endurance. Requires assistance with all mobility.   Recommendation :  PT intervention 2x week for 3 week s    Please advise if approved

## 2021-11-03 NOTE — TELEPHONE ENCOUNTER
[facility] OT patti completed and per OT Pt with decreased functional mobility for adl and IADL routines, increased rib pain inhibits her ability to breathe deeply putting her at risk for pneumonia  Recommendatoin :  OT 2x/week x 2 weeks, beginning 11/7/2021; Also requesting  To add ST  evaluate/treat for breathing exercises;   And SN evaluate/treat to monitor lungs    Please advise if approved

## 2021-11-19 NOTE — TELEPHONE ENCOUNTER
Requested Prescriptions     Pending Prescriptions Disp Refills    metoprolol tartrate (LOPRESSOR) 50 MG tablet [Pharmacy Med Name: METOPROLOL TARTRATE 50MG TABLET] 180 tablet 3     Sig: TAKE ONE (1) TABLET TWICE DAILY    montelukast (SINGULAIR) 10 MG tablet [Pharmacy Med Name: MONTELUKAST SODIUM 10MG TABLET] 90 tablet 3     Sig: TAKE ONE TABLET BY MOUTH DAILY

## 2021-11-24 NOTE — TELEPHONE ENCOUNTER
[address] [id number] Speech Therapy notifying that pt has been discharged from [facility] with goals met

## 2022-01-01 ENCOUNTER — HOSPITAL ENCOUNTER (INPATIENT)
Age: 87
LOS: 3 days | DRG: 682 | End: 2022-08-01
Attending: FAMILY MEDICINE | Admitting: HOSPITALIST
Payer: MEDICARE

## 2022-01-01 ENCOUNTER — OFFICE VISIT (OUTPATIENT)
Dept: INTERNAL MEDICINE | Age: 87
End: 2022-01-01
Payer: MEDICARE

## 2022-01-01 ENCOUNTER — APPOINTMENT (OUTPATIENT)
Dept: GENERAL RADIOLOGY | Age: 87
DRG: 682 | End: 2022-01-01
Payer: MEDICARE

## 2022-01-01 ENCOUNTER — TELEPHONE (OUTPATIENT)
Dept: INTERNAL MEDICINE | Age: 87
End: 2022-01-01

## 2022-01-01 ENCOUNTER — APPOINTMENT (OUTPATIENT)
Dept: ULTRASOUND IMAGING | Age: 87
DRG: 682 | End: 2022-01-01
Payer: MEDICARE

## 2022-01-01 ENCOUNTER — HOSPITAL ENCOUNTER (OUTPATIENT)
Dept: GENERAL RADIOLOGY | Age: 87
Discharge: HOME | End: 2022-03-18
Payer: MEDICARE

## 2022-01-01 VITALS
HEART RATE: 94 BPM | HEIGHT: 64 IN | WEIGHT: 120.4 LBS | DIASTOLIC BLOOD PRESSURE: 69 MMHG | OXYGEN SATURATION: 92 % | RESPIRATION RATE: 22 BRPM | BODY MASS INDEX: 20.55 KG/M2 | TEMPERATURE: 97 F | SYSTOLIC BLOOD PRESSURE: 107 MMHG

## 2022-01-01 VITALS
BODY MASS INDEX: 21.17 KG/M2 | DIASTOLIC BLOOD PRESSURE: 58 MMHG | HEIGHT: 64 IN | SYSTOLIC BLOOD PRESSURE: 120 MMHG | OXYGEN SATURATION: 96 % | HEART RATE: 66 BPM | WEIGHT: 124 LBS

## 2022-01-01 VITALS — HEART RATE: 111 BPM | DIASTOLIC BLOOD PRESSURE: 60 MMHG | OXYGEN SATURATION: 99 % | SYSTOLIC BLOOD PRESSURE: 90 MMHG

## 2022-01-01 DIAGNOSIS — G89.29 CHRONIC BILATERAL LOW BACK PAIN WITHOUT SCIATICA: ICD-10-CM

## 2022-01-01 DIAGNOSIS — E86.0 DEHYDRATION: ICD-10-CM

## 2022-01-01 DIAGNOSIS — I95.9 HYPOTENSION, UNSPECIFIED HYPOTENSION TYPE: Primary | ICD-10-CM

## 2022-01-01 DIAGNOSIS — M54.50 CHRONIC BILATERAL LOW BACK PAIN WITHOUT SCIATICA: ICD-10-CM

## 2022-01-01 DIAGNOSIS — R53.1 GENERALIZED WEAKNESS: ICD-10-CM

## 2022-01-01 DIAGNOSIS — G89.29 CHRONIC MIDLINE THORACIC BACK PAIN: ICD-10-CM

## 2022-01-01 DIAGNOSIS — Z86.73 HISTORY OF COMPLETED STROKE: Primary | ICD-10-CM

## 2022-01-01 DIAGNOSIS — R53.83 OTHER FATIGUE: Primary | ICD-10-CM

## 2022-01-01 DIAGNOSIS — M54.6 CHRONIC MIDLINE THORACIC BACK PAIN: ICD-10-CM

## 2022-01-01 DIAGNOSIS — N32.81 OVERACTIVE BLADDER: ICD-10-CM

## 2022-01-01 DIAGNOSIS — N18.32 STAGE 3B CHRONIC KIDNEY DISEASE (HCC): ICD-10-CM

## 2022-01-01 DIAGNOSIS — R19.7 DIARRHEA, UNSPECIFIED TYPE: ICD-10-CM

## 2022-01-01 DIAGNOSIS — J18.9 PNEUMONIA OF RIGHT LOWER LOBE DUE TO INFECTIOUS ORGANISM: ICD-10-CM

## 2022-01-01 DIAGNOSIS — I10 PRIMARY HYPERTENSION: ICD-10-CM

## 2022-01-01 DIAGNOSIS — R13.10 DYSPHAGIA, UNSPECIFIED TYPE: ICD-10-CM

## 2022-01-01 LAB
ACINETOBACTER CALCOAC BAUMANNII COMPLEX BY PCR: NOT DETECTED
ADENOVIRUS BY PCR: NOT DETECTED
ALBUMIN SERPL-MCNC: 3.6 G/DL (ref 3.5–5.2)
ALP BLD-CCNC: 98 U/L (ref 35–104)
ALT SERPL-CCNC: 47 U/L (ref 5–33)
ANION GAP SERPL CALCULATED.3IONS-SCNC: 12 MMOL/L (ref 7–19)
ANION GAP SERPL CALCULATED.3IONS-SCNC: 13 MMOL/L (ref 7–19)
ANION GAP SERPL CALCULATED.3IONS-SCNC: 14 MMOL/L (ref 7–19)
ANION GAP SERPL CALCULATED.3IONS-SCNC: 16 MMOL/L (ref 7–19)
AST SERPL-CCNC: 41 U/L (ref 5–32)
BACTERIA: NEGATIVE /HPF
BACTEROIDES FRAGILIS BY PCR: NOT DETECTED
BASE EXCESS ARTERIAL: -5.4 MMOL/L (ref -2–2)
BASOPHILS ABSOLUTE: 0 K/UL (ref 0–0.2)
BASOPHILS ABSOLUTE: 0.1 K/UL (ref 0–0.2)
BASOPHILS RELATIVE PERCENT: 0.3 % (ref 0–1)
BASOPHILS RELATIVE PERCENT: 0.5 % (ref 0–1)
BASOPHILS RELATIVE PERCENT: 0.6 % (ref 0–1)
BASOPHILS RELATIVE PERCENT: 0.6 % (ref 0–1)
BILIRUB SERPL-MCNC: 0.4 MG/DL (ref 0.2–1.2)
BILIRUBIN URINE: NEGATIVE
BLOOD CULTURE, ROUTINE: ABNORMAL
BLOOD CULTURE, ROUTINE: ABNORMAL
BLOOD, URINE: NEGATIVE
BORDETELLA PARAPERTUSSIS BY PCR: NOT DETECTED
BORDETELLA PERTUSSIS BY PCR: NOT DETECTED
BUN BLDV-MCNC: 38 MG/DL (ref 8–23)
BUN BLDV-MCNC: 40 MG/DL (ref 8–23)
BUN BLDV-MCNC: 41 MG/DL (ref 8–23)
BUN BLDV-MCNC: 42 MG/DL (ref 8–23)
CALCIUM SERPL-MCNC: 8.7 MG/DL (ref 8.2–9.6)
CALCIUM SERPL-MCNC: 9 MG/DL (ref 8.2–9.6)
CALCIUM SERPL-MCNC: 9.1 MG/DL (ref 8.2–9.6)
CALCIUM SERPL-MCNC: 9.6 MG/DL (ref 8.2–9.6)
CANDIDA ALBICANS BY PCR: NOT DETECTED
CANDIDA AURIS BY PCR: NOT DETECTED
CANDIDA GLABRATA BY PCR: NOT DETECTED
CANDIDA KRUSEI BY PCR: NOT DETECTED
CANDIDA PARAPSILOSIS BY PCR: NOT DETECTED
CANDIDA TROPICALIS BY PCR: NOT DETECTED
CARBOXYHEMOGLOBIN ARTERIAL: 1.2 % (ref 0–5)
CHLAMYDOPHILIA PNEUMONIAE BY PCR: NOT DETECTED
CHLORIDE BLD-SCNC: 102 MMOL/L (ref 98–111)
CHLORIDE BLD-SCNC: 104 MMOL/L (ref 98–111)
CHLORIDE BLD-SCNC: 98 MMOL/L (ref 98–111)
CHLORIDE BLD-SCNC: 99 MMOL/L (ref 98–111)
CLARITY: CLEAR
CO2: 19 MMOL/L (ref 22–29)
CO2: 20 MMOL/L (ref 22–29)
CO2: 22 MMOL/L (ref 22–29)
CO2: 24 MMOL/L (ref 22–29)
COLOR: YELLOW
CORONAVIRUS 229E BY PCR: NOT DETECTED
CORONAVIRUS HKU1 BY PCR: NOT DETECTED
CORONAVIRUS NL63 BY PCR: NOT DETECTED
CORONAVIRUS OC43 BY PCR: NOT DETECTED
CREAT SERPL-MCNC: 1.4 MG/DL (ref 0.5–0.9)
CREAT SERPL-MCNC: 1.4 MG/DL (ref 0.5–0.9)
CREAT SERPL-MCNC: 1.5 MG/DL (ref 0.5–0.9)
CREAT SERPL-MCNC: 1.5 MG/DL (ref 0.5–0.9)
CREATININE URINE: 160.3 MG/DL (ref 4.2–622)
CREATININE URINE: 160.4 MG/DL (ref 4.2–622)
CRYPTOCOCCUS NEOFORMANS/GATTII BY PCR: NOT DETECTED
CRYSTALS, UA: ABNORMAL /HPF
EKG P AXIS: NORMAL DEGREES
EKG P-R INTERVAL: NORMAL MS
EKG Q-T INTERVAL: 304 MS
EKG Q-T INTERVAL: 352 MS
EKG Q-T INTERVAL: 356 MS
EKG QRS DURATION: 88 MS
EKG QRS DURATION: 92 MS
EKG QRS DURATION: 94 MS
EKG QTC CALCULATION (BAZETT): 422 MS
EKG QTC CALCULATION (BAZETT): 431 MS
EKG QTC CALCULATION (BAZETT): 435 MS
EKG T AXIS: 146 DEGREES
EKG T AXIS: 159 DEGREES
EKG T AXIS: 167 DEGREES
ENTEROBACTER CLOACAE COMPLEX BY PCR: NOT DETECTED
ENTEROBACTERALES BY PCR: NOT DETECTED
ENTEROCOCCUS FAECALIS BY PCR: NOT DETECTED
ENTEROCOCCUS FAECIUM BY PCR: NOT DETECTED
EOSINOPHIL,URINE: NORMAL
EOSINOPHIL,URINE: NORMAL
EOSINOPHILS ABSOLUTE: 0 K/UL (ref 0–0.6)
EOSINOPHILS ABSOLUTE: 0 K/UL (ref 0–0.6)
EOSINOPHILS ABSOLUTE: 0.1 K/UL (ref 0–0.6)
EOSINOPHILS ABSOLUTE: 0.1 K/UL (ref 0–0.6)
EOSINOPHILS RELATIVE PERCENT: 0.3 % (ref 0–5)
EOSINOPHILS RELATIVE PERCENT: 0.3 % (ref 0–5)
EOSINOPHILS RELATIVE PERCENT: 0.4 % (ref 0–5)
EOSINOPHILS RELATIVE PERCENT: 0.4 % (ref 0–5)
EPITHELIAL CELLS, UA: 0 /HPF (ref 0–5)
ESCHERICHIA COLI BY PCR: NOT DETECTED
GFR AFRICAN AMERICAN: 39
GFR AFRICAN AMERICAN: 39
GFR AFRICAN AMERICAN: 42
GFR AFRICAN AMERICAN: 42
GFR NON-AFRICAN AMERICAN: 32
GFR NON-AFRICAN AMERICAN: 32
GFR NON-AFRICAN AMERICAN: 35
GFR NON-AFRICAN AMERICAN: 35
GLUCOSE BLD-MCNC: 104 MG/DL (ref 74–109)
GLUCOSE BLD-MCNC: 115 MG/DL (ref 74–109)
GLUCOSE BLD-MCNC: 120 MG/DL (ref 74–109)
GLUCOSE BLD-MCNC: 169 MG/DL (ref 74–109)
GLUCOSE URINE: NEGATIVE MG/DL
HAEMOPHILUS INFLUENZAE BY PCR: NOT DETECTED
HCO3 ARTERIAL: 20 MMOL/L (ref 22–26)
HCT VFR BLD CALC: 41.1 % (ref 37–47)
HCT VFR BLD CALC: 42.9 % (ref 37–47)
HCT VFR BLD CALC: 44.9 % (ref 37–47)
HCT VFR BLD CALC: 45.2 % (ref 37–47)
HEMOGLOBIN, ART, EXTENDED: 14 G/DL (ref 12–16)
HEMOGLOBIN: 13.2 G/DL (ref 12–16)
HEMOGLOBIN: 13.4 G/DL (ref 12–16)
HEMOGLOBIN: 14.3 G/DL (ref 12–16)
HEMOGLOBIN: 15 G/DL (ref 12–16)
HUMAN METAPNEUMOVIRUS BY PCR: NOT DETECTED
HUMAN RHINOVIRUS/ENTEROVIRUS BY PCR: NOT DETECTED
HYALINE CASTS: 10 /HPF (ref 0–8)
IMMATURE GRANULOCYTES #: 0 K/UL
IMMATURE GRANULOCYTES #: 0 K/UL
IMMATURE GRANULOCYTES #: 0.1 K/UL
IMMATURE GRANULOCYTES #: 0.1 K/UL
INFLUENZA A BY PCR: NOT DETECTED
INFLUENZA B BY PCR: NOT DETECTED
KETONES, URINE: ABNORMAL MG/DL
KLEBSIELLA AEROGENES BY PCR: NOT DETECTED
KLEBSIELLA OXYTOCA BY PCR: NOT DETECTED
KLEBSIELLA PNEUMONIAE GROUP BY PCR: NOT DETECTED
LACTIC ACID: 1.9 MMOL/L (ref 0.5–1.9)
LACTIC ACID: 2.2 MMOL/L (ref 0.5–1.9)
LACTIC ACID: 2.5 MMOL/L (ref 0.5–1.9)
LACTIC ACID: 3.1 MMOL/L (ref 0.5–1.9)
LACTIC ACID: 3.9 MMOL/L (ref 0.5–1.9)
LEUKOCYTE ESTERASE, URINE: ABNORMAL
LIPASE: 21 U/L (ref 13–60)
LISTERIA MONOCYTOGENES BY PCR: NOT DETECTED
LV EF: 58 %
LVEF MODALITY: NORMAL
LYMPHOCYTES ABSOLUTE: 1.3 K/UL (ref 1.1–4.5)
LYMPHOCYTES ABSOLUTE: 1.3 K/UL (ref 1.1–4.5)
LYMPHOCYTES ABSOLUTE: 1.6 K/UL (ref 1.1–4.5)
LYMPHOCYTES ABSOLUTE: 2 K/UL (ref 1.1–4.5)
LYMPHOCYTES RELATIVE PERCENT: 12.5 % (ref 20–40)
LYMPHOCYTES RELATIVE PERCENT: 12.6 % (ref 20–40)
LYMPHOCYTES RELATIVE PERCENT: 13.9 % (ref 20–40)
LYMPHOCYTES RELATIVE PERCENT: 9.3 % (ref 20–40)
MAGNESIUM: 2.1 MG/DL (ref 1.7–2.3)
MCH RBC QN AUTO: 30 PG (ref 27–31)
MCH RBC QN AUTO: 30.4 PG (ref 27–31)
MCH RBC QN AUTO: 30.8 PG (ref 27–31)
MCH RBC QN AUTO: 31.1 PG (ref 27–31)
MCHC RBC AUTO-ENTMCNC: 31.2 G/DL (ref 33–37)
MCHC RBC AUTO-ENTMCNC: 31.8 G/DL (ref 33–37)
MCHC RBC AUTO-ENTMCNC: 32.1 G/DL (ref 33–37)
MCHC RBC AUTO-ENTMCNC: 33.2 G/DL (ref 33–37)
MCV RBC AUTO: 93.6 FL (ref 81–99)
MCV RBC AUTO: 95.3 FL (ref 81–99)
MCV RBC AUTO: 95.8 FL (ref 81–99)
MCV RBC AUTO: 96 FL (ref 81–99)
METHEMOGLOBIN ARTERIAL: 0.6 %
MONOCYTES ABSOLUTE: 1 K/UL (ref 0–0.9)
MONOCYTES ABSOLUTE: 1.2 K/UL (ref 0–0.9)
MONOCYTES ABSOLUTE: 1.7 K/UL (ref 0–0.9)
MONOCYTES ABSOLUTE: 1.9 K/UL (ref 0–0.9)
MONOCYTES RELATIVE PERCENT: 11.1 % (ref 0–10)
MONOCYTES RELATIVE PERCENT: 11.8 % (ref 0–10)
MONOCYTES RELATIVE PERCENT: 12.5 % (ref 0–10)
MONOCYTES RELATIVE PERCENT: 9.6 % (ref 0–10)
MRSA SCREEN RT-PCR: NOT DETECTED
MYCOPLASMA PNEUMONIAE BY PCR: NOT DETECTED
NEISSERIA MENINGITIDIS BY PCR: NOT DETECTED
NEUTROPHILS ABSOLUTE: 10.3 K/UL (ref 1.5–7.5)
NEUTROPHILS ABSOLUTE: 11.7 K/UL (ref 1.5–7.5)
NEUTROPHILS ABSOLUTE: 8.1 K/UL (ref 1.5–7.5)
NEUTROPHILS ABSOLUTE: 8.3 K/UL (ref 1.5–7.5)
NEUTROPHILS RELATIVE PERCENT: 74 % (ref 50–65)
NEUTROPHILS RELATIVE PERCENT: 74.1 % (ref 50–65)
NEUTROPHILS RELATIVE PERCENT: 76.6 % (ref 50–65)
NEUTROPHILS RELATIVE PERCENT: 76.7 % (ref 50–65)
NITRITE, URINE: NEGATIVE
O2 CONTENT ARTERIAL: 17.8 ML/DL
O2 DELIVERY DEVICE: ABNORMAL
O2 SAT, ARTERIAL: 90.4 %
O2 THERAPY: ABNORMAL
ORGANISM: ABNORMAL
OSMOLALITY URINE: 482 MOSM/KG (ref 250–1200)
OSMOLALITY URINE: 566 MOSM/KG (ref 250–1200)
OXYGEN FLOW: 2
PARAINFLUENZA VIRUS 1 BY PCR: NOT DETECTED
PARAINFLUENZA VIRUS 2 BY PCR: NOT DETECTED
PARAINFLUENZA VIRUS 3 BY PCR: NOT DETECTED
PARAINFLUENZA VIRUS 4 BY PCR: NOT DETECTED
PCO2 ARTERIAL: 38 MMHG (ref 35–45)
PDW BLD-RTO: 13 % (ref 11.5–14.5)
PDW BLD-RTO: 13.2 % (ref 11.5–14.5)
PH ARTERIAL: 7.33 (ref 7.35–7.45)
PH UA: 5 (ref 5–8)
PHOSPHORUS: 4.1 MG/DL (ref 2.5–4.5)
PLATELET # BLD: 215 K/UL (ref 130–400)
PLATELET # BLD: 216 K/UL (ref 130–400)
PLATELET # BLD: 245 K/UL (ref 130–400)
PLATELET # BLD: 249 K/UL (ref 130–400)
PMV BLD AUTO: 10.3 FL (ref 9.4–12.3)
PMV BLD AUTO: 10.6 FL (ref 9.4–12.3)
PMV BLD AUTO: 10.8 FL (ref 9.4–12.3)
PMV BLD AUTO: 10.8 FL (ref 9.4–12.3)
PO2 ARTERIAL: 68 MMHG (ref 80–100)
POTASSIUM REFLEX MAGNESIUM: 4.1 MMOL/L (ref 3.5–5)
POTASSIUM REFLEX MAGNESIUM: 4.5 MMOL/L (ref 3.5–5)
POTASSIUM REFLEX MAGNESIUM: 5 MMOL/L (ref 3.5–5)
POTASSIUM REFLEX MAGNESIUM: 5.3 MMOL/L (ref 3.5–5)
POTASSIUM SERPL-SCNC: 4.9 MMOL/L (ref 3.5–5)
POTASSIUM, WHOLE BLOOD: 4.2
PRO-BNP: 7866 PG/ML (ref 0–1800)
PRO-BNP: ABNORMAL PG/ML (ref 0–1800)
PROCALCITONIN: 0.1 NG/ML (ref 0–0.09)
PROTEIN PROTEIN: 26 MG/DL (ref 15–45)
PROTEIN UA: ABNORMAL MG/DL
PROTEUS SPECIES BY PCR: NOT DETECTED
PSEUDOMONAS AERUGINOSA BY PCR: NOT DETECTED
RBC # BLD: 4.29 M/UL (ref 4.2–5.4)
RBC # BLD: 4.47 M/UL (ref 4.2–5.4)
RBC # BLD: 4.71 M/UL (ref 4.2–5.4)
RBC # BLD: 4.83 M/UL (ref 4.2–5.4)
RBC UA: 2 /HPF (ref 0–4)
REASON FOR REJECTION: NORMAL
REJECTED TEST: NORMAL
RESPIRATORY SYNCYTIAL VIRUS BY PCR: NOT DETECTED
SALMONELLA SPECIES BY PCR: NOT DETECTED
SARS-COV-2, NAAT: NOT DETECTED
SARS-COV-2, PCR: NOT DETECTED
SERRATIA MARCESCENS BY PCR: NOT DETECTED
SODIUM BLD-SCNC: 134 MMOL/L (ref 136–145)
SODIUM BLD-SCNC: 136 MMOL/L (ref 136–145)
SODIUM BLD-SCNC: 136 MMOL/L (ref 136–145)
SODIUM BLD-SCNC: 137 MMOL/L (ref 136–145)
SODIUM URINE: <20 MMOL/L
SODIUM URINE: <20 MMOL/L
SPECIFIC GRAVITY UA: 1.02 (ref 1–1.03)
STAPHYLOCOCCUS AUREUS BY PCR: NOT DETECTED
STAPHYLOCOCCUS EPIDERMIDIS BY PCR: NOT DETECTED
STAPHYLOCOCCUS LUGDUNENSIS BY PCR: NOT DETECTED
STAPHYLOCOCCUS SPECIES BY PCR: DETECTED
STENOTROPHOMONAS MALTOPHILIA BY PCR: NOT DETECTED
STREPTOCOCCUS AGALACTIAE BY PCR: NOT DETECTED
STREPTOCOCCUS PNEUMONIAE BY PCR: NOT DETECTED
STREPTOCOCCUS PYOGENES  BY PCR: NOT DETECTED
STREPTOCOCCUS SPECIES BY PCR: NOT DETECTED
TOTAL PROTEIN: 6.9 G/DL (ref 6.6–8.7)
TROPONIN: 0.09 NG/ML (ref 0–0.03)
TROPONIN: 0.09 NG/ML (ref 0–0.03)
TROPONIN: 0.1 NG/ML (ref 0–0.03)
UROBILINOGEN, URINE: 0.2 E.U./DL
WBC # BLD: 10.6 K/UL (ref 4.8–10.8)
WBC # BLD: 11.1 K/UL (ref 4.8–10.8)
WBC # BLD: 13.4 K/UL (ref 4.8–10.8)
WBC # BLD: 15.8 K/UL (ref 4.8–10.8)
WBC UA: 25 /HPF (ref 0–5)

## 2022-01-01 PROCEDURE — 2580000003 HC RX 258: Performed by: HOSPITALIST

## 2022-01-01 PROCEDURE — 80053 COMPREHEN METABOLIC PANEL: CPT

## 2022-01-01 PROCEDURE — 84484 ASSAY OF TROPONIN QUANT: CPT

## 2022-01-01 PROCEDURE — 71045 X-RAY EXAM CHEST 1 VIEW: CPT

## 2022-01-01 PROCEDURE — 36415 COLL VENOUS BLD VENIPUNCTURE: CPT

## 2022-01-01 PROCEDURE — 87635 SARS-COV-2 COVID-19 AMP PRB: CPT

## 2022-01-01 PROCEDURE — 1123F ACP DISCUSS/DSCN MKR DOCD: CPT | Performed by: INTERNAL MEDICINE

## 2022-01-01 PROCEDURE — 82803 BLOOD GASES ANY COMBINATION: CPT

## 2022-01-01 PROCEDURE — 36600 WITHDRAWAL OF ARTERIAL BLOOD: CPT

## 2022-01-01 PROCEDURE — 80048 BASIC METABOLIC PNL TOTAL CA: CPT

## 2022-01-01 PROCEDURE — 93005 ELECTROCARDIOGRAM TRACING: CPT | Performed by: FAMILY MEDICINE

## 2022-01-01 PROCEDURE — 94640 AIRWAY INHALATION TREATMENT: CPT

## 2022-01-01 PROCEDURE — 1036F TOBACCO NON-USER: CPT | Performed by: INTERNAL MEDICINE

## 2022-01-01 PROCEDURE — 4040F PNEUMOC VAC/ADMIN/RCVD: CPT | Performed by: INTERNAL MEDICINE

## 2022-01-01 PROCEDURE — 96361 HYDRATE IV INFUSION ADD-ON: CPT

## 2022-01-01 PROCEDURE — 83735 ASSAY OF MAGNESIUM: CPT

## 2022-01-01 PROCEDURE — 83935 ASSAY OF URINE OSMOLALITY: CPT

## 2022-01-01 PROCEDURE — 2140000000 HC CCU INTERMEDIATE R&B

## 2022-01-01 PROCEDURE — 6370000000 HC RX 637 (ALT 250 FOR IP): Performed by: HOSPITALIST

## 2022-01-01 PROCEDURE — 83605 ASSAY OF LACTIC ACID: CPT

## 2022-01-01 PROCEDURE — 1090F PRES/ABSN URINE INCON ASSESS: CPT | Performed by: INTERNAL MEDICINE

## 2022-01-01 PROCEDURE — 82570 ASSAY OF URINE CREATININE: CPT

## 2022-01-01 PROCEDURE — 84100 ASSAY OF PHOSPHORUS: CPT

## 2022-01-01 PROCEDURE — 87150 DNA/RNA AMPLIFIED PROBE: CPT

## 2022-01-01 PROCEDURE — 0202U NFCT DS 22 TRGT SARS-COV-2: CPT

## 2022-01-01 PROCEDURE — 97116 GAIT TRAINING THERAPY: CPT

## 2022-01-01 PROCEDURE — 93010 ELECTROCARDIOGRAM REPORT: CPT | Performed by: INTERNAL MEDICINE

## 2022-01-01 PROCEDURE — 85025 COMPLETE CBC W/AUTO DIFF WBC: CPT

## 2022-01-01 PROCEDURE — 84300 ASSAY OF URINE SODIUM: CPT

## 2022-01-01 PROCEDURE — 87641 MR-STAPH DNA AMP PROBE: CPT

## 2022-01-01 PROCEDURE — 6360000002 HC RX W HCPCS: Performed by: INTERNAL MEDICINE

## 2022-01-01 PROCEDURE — 2580000003 HC RX 258: Performed by: FAMILY MEDICINE

## 2022-01-01 PROCEDURE — 6360000002 HC RX W HCPCS: Performed by: HOSPITALIST

## 2022-01-01 PROCEDURE — 5A12012 PERFORMANCE OF CARDIAC OUTPUT, SINGLE, MANUAL: ICD-10-PCS | Performed by: INTERNAL MEDICINE

## 2022-01-01 PROCEDURE — G8420 CALC BMI NORM PARAMETERS: HCPCS | Performed by: INTERNAL MEDICINE

## 2022-01-01 PROCEDURE — 71045 X-RAY EXAM CHEST 1 VIEW: CPT | Performed by: RADIOLOGY

## 2022-01-01 PROCEDURE — 87040 BLOOD CULTURE FOR BACTERIA: CPT

## 2022-01-01 PROCEDURE — 83690 ASSAY OF LIPASE: CPT

## 2022-01-01 PROCEDURE — 84132 ASSAY OF SERUM POTASSIUM: CPT

## 2022-01-01 PROCEDURE — 99213 OFFICE O/P EST LOW 20 MIN: CPT | Performed by: INTERNAL MEDICINE

## 2022-01-01 PROCEDURE — 97161 PT EVAL LOW COMPLEX 20 MIN: CPT

## 2022-01-01 PROCEDURE — 92522 EVALUATE SPEECH PRODUCTION: CPT

## 2022-01-01 PROCEDURE — 6360000002 HC RX W HCPCS

## 2022-01-01 PROCEDURE — 83880 ASSAY OF NATRIURETIC PEPTIDE: CPT

## 2022-01-01 PROCEDURE — 96374 THER/PROPH/DIAG INJ IV PUSH: CPT

## 2022-01-01 PROCEDURE — 87205 SMEAR GRAM STAIN: CPT

## 2022-01-01 PROCEDURE — 93308 TTE F-UP OR LMTD: CPT

## 2022-01-01 PROCEDURE — 76770 US EXAM ABDO BACK WALL COMP: CPT

## 2022-01-01 PROCEDURE — 76770 US EXAM ABDO BACK WALL COMP: CPT | Performed by: RADIOLOGY

## 2022-01-01 PROCEDURE — G8484 FLU IMMUNIZE NO ADMIN: HCPCS | Performed by: INTERNAL MEDICINE

## 2022-01-01 PROCEDURE — 81001 URINALYSIS AUTO W/SCOPE: CPT

## 2022-01-01 PROCEDURE — 84145 PROCALCITONIN (PCT): CPT

## 2022-01-01 PROCEDURE — 92610 EVALUATE SWALLOWING FUNCTION: CPT

## 2022-01-01 PROCEDURE — G8427 DOCREV CUR MEDS BY ELIG CLIN: HCPCS | Performed by: INTERNAL MEDICINE

## 2022-01-01 PROCEDURE — 99285 EMERGENCY DEPT VISIT HI MDM: CPT

## 2022-01-01 PROCEDURE — 99214 OFFICE O/P EST MOD 30 MIN: CPT | Performed by: INTERNAL MEDICINE

## 2022-01-01 PROCEDURE — 84156 ASSAY OF PROTEIN URINE: CPT

## 2022-01-01 PROCEDURE — 2700000000 HC OXYGEN THERAPY PER DAY

## 2022-01-01 PROCEDURE — 93005 ELECTROCARDIOGRAM TRACING: CPT | Performed by: INTERNAL MEDICINE

## 2022-01-01 PROCEDURE — 6360000002 HC RX W HCPCS: Performed by: FAMILY MEDICINE

## 2022-01-01 PROCEDURE — 2580000003 HC RX 258: Performed by: INTERNAL MEDICINE

## 2022-01-01 PROCEDURE — 2500000003 HC RX 250 WO HCPCS: Performed by: HOSPITALIST

## 2022-01-01 PROCEDURE — 72070 X-RAY EXAM THORAC SPINE 2VWS: CPT

## 2022-01-01 PROCEDURE — 72100 X-RAY EXAM L-S SPINE 2/3 VWS: CPT

## 2022-01-01 RX ORDER — ONDANSETRON 4 MG/1
4 TABLET, ORALLY DISINTEGRATING ORAL EVERY 8 HOURS PRN
Status: DISCONTINUED | OUTPATIENT
Start: 2022-01-01 | End: 2022-01-01 | Stop reason: HOSPADM

## 2022-01-01 RX ORDER — DILTIAZEM HYDROCHLORIDE 5 MG/ML
5 INJECTION INTRAVENOUS ONCE
Status: COMPLETED | OUTPATIENT
Start: 2022-01-01 | End: 2022-01-01

## 2022-01-01 RX ORDER — MECOBALAMIN 5000 MCG
5 TABLET,DISINTEGRATING ORAL NIGHTLY PRN
Status: DISCONTINUED | OUTPATIENT
Start: 2022-01-01 | End: 2022-01-01 | Stop reason: HOSPADM

## 2022-01-01 RX ORDER — MECOBALAMIN 5000 MCG
5 TABLET,DISINTEGRATING ORAL NIGHTLY
Status: DISCONTINUED | OUTPATIENT
Start: 2022-01-01 | End: 2022-01-01 | Stop reason: HOSPADM

## 2022-01-01 RX ORDER — TROSPIUM CHLORIDE 20 MG/1
20 TABLET, FILM COATED ORAL NIGHTLY
Status: DISCONTINUED | OUTPATIENT
Start: 2022-01-01 | End: 2022-01-01 | Stop reason: HOSPADM

## 2022-01-01 RX ORDER — FLUTICASONE PROPIONATE 50 MCG
2 SPRAY, SUSPENSION (ML) NASAL DAILY PRN
Status: DISCONTINUED | OUTPATIENT
Start: 2022-01-01 | End: 2022-01-01 | Stop reason: HOSPADM

## 2022-01-01 RX ORDER — 0.9 % SODIUM CHLORIDE 0.9 %
1000 INTRAVENOUS SOLUTION INTRAVENOUS ONCE
Status: COMPLETED | OUTPATIENT
Start: 2022-01-01 | End: 2022-01-01

## 2022-01-01 RX ORDER — SODIUM CHLORIDE, SODIUM LACTATE, POTASSIUM CHLORIDE, CALCIUM CHLORIDE 600; 310; 30; 20 MG/100ML; MG/100ML; MG/100ML; MG/100ML
INJECTION, SOLUTION INTRAVENOUS CONTINUOUS
Status: DISCONTINUED | OUTPATIENT
Start: 2022-01-01 | End: 2022-01-01

## 2022-01-01 RX ORDER — HEPARIN SODIUM 5000 [USP'U]/ML
5000 INJECTION, SOLUTION INTRAVENOUS; SUBCUTANEOUS EVERY 8 HOURS SCHEDULED
Status: DISCONTINUED | OUTPATIENT
Start: 2022-01-01 | End: 2022-01-01 | Stop reason: HOSPADM

## 2022-01-01 RX ORDER — ONDANSETRON 2 MG/ML
4 INJECTION INTRAMUSCULAR; INTRAVENOUS ONCE
Status: COMPLETED | OUTPATIENT
Start: 2022-01-01 | End: 2022-01-01

## 2022-01-01 RX ORDER — ALBUTEROL SULFATE 2.5 MG/3ML
2.5 SOLUTION RESPIRATORY (INHALATION) ONCE
Status: COMPLETED | OUTPATIENT
Start: 2022-01-01 | End: 2022-01-01

## 2022-01-01 RX ORDER — CALCIUM CARBONATE 500(1250)
500 TABLET ORAL DAILY
Status: DISCONTINUED | OUTPATIENT
Start: 2022-01-01 | End: 2022-01-01 | Stop reason: HOSPADM

## 2022-01-01 RX ORDER — CLOPIDOGREL BISULFATE 75 MG/1
75 TABLET ORAL DAILY
Status: DISCONTINUED | OUTPATIENT
Start: 2022-01-01 | End: 2022-01-01 | Stop reason: HOSPADM

## 2022-01-01 RX ORDER — IPRATROPIUM BROMIDE AND ALBUTEROL SULFATE 2.5; .5 MG/3ML; MG/3ML
1 SOLUTION RESPIRATORY (INHALATION) 4 TIMES DAILY
Status: DISCONTINUED | OUTPATIENT
Start: 2022-01-01 | End: 2022-01-01 | Stop reason: HOSPADM

## 2022-01-01 RX ORDER — METOPROLOL TARTRATE 50 MG/1
50 TABLET, FILM COATED ORAL 2 TIMES DAILY
Status: DISCONTINUED | OUTPATIENT
Start: 2022-01-01 | End: 2022-01-01 | Stop reason: HOSPADM

## 2022-01-01 RX ORDER — ALBUTEROL SULFATE 2.5 MG/3ML
2.5 SOLUTION RESPIRATORY (INHALATION) EVERY 4 HOURS PRN
Status: DISCONTINUED | OUTPATIENT
Start: 2022-01-01 | End: 2022-01-01 | Stop reason: HOSPADM

## 2022-01-01 RX ORDER — CELECOXIB 100 MG/1
100 CAPSULE ORAL DAILY
Qty: 30 CAPSULE | Refills: 0 | Status: SHIPPED | OUTPATIENT
Start: 2022-01-01 | End: 2022-01-01

## 2022-01-01 RX ORDER — ACETAMINOPHEN 650 MG/1
650 SUPPOSITORY RECTAL EVERY 6 HOURS PRN
Status: DISCONTINUED | OUTPATIENT
Start: 2022-01-01 | End: 2022-01-01 | Stop reason: HOSPADM

## 2022-01-01 RX ORDER — MONTELUKAST SODIUM 10 MG/1
10 TABLET ORAL DAILY
Status: DISCONTINUED | OUTPATIENT
Start: 2022-01-01 | End: 2022-01-01 | Stop reason: HOSPADM

## 2022-01-01 RX ORDER — VITAMIN B COMPLEX
2000 TABLET ORAL DAILY
Status: DISCONTINUED | OUTPATIENT
Start: 2022-01-01 | End: 2022-01-01

## 2022-01-01 RX ORDER — SODIUM POLYSTYRENE SULFONATE 15 G/60ML
15 SUSPENSION ORAL; RECTAL ONCE
Status: COMPLETED | OUTPATIENT
Start: 2022-01-01 | End: 2022-01-01

## 2022-01-01 RX ORDER — DILTIAZEM HCL IN NACL,ISO-OSM 125 MG/125
2.5-15 PLASTIC BAG, INJECTION (ML) INTRAVENOUS CONTINUOUS
Status: DISCONTINUED | OUTPATIENT
Start: 2022-01-01 | End: 2022-01-01 | Stop reason: HOSPADM

## 2022-01-01 RX ORDER — CALCIUM CARBONATE 200(500)MG
500 TABLET,CHEWABLE ORAL 3 TIMES DAILY PRN
Status: DISCONTINUED | OUTPATIENT
Start: 2022-01-01 | End: 2022-01-01 | Stop reason: HOSPADM

## 2022-01-01 RX ORDER — ACETAMINOPHEN 325 MG/1
650 TABLET ORAL EVERY 6 HOURS PRN
Status: DISCONTINUED | OUTPATIENT
Start: 2022-01-01 | End: 2022-01-01 | Stop reason: HOSPADM

## 2022-01-01 RX ORDER — CELECOXIB 100 MG/1
CAPSULE ORAL
Qty: 30 CAPSULE | Refills: 2 | Status: SHIPPED | OUTPATIENT
Start: 2022-01-01

## 2022-01-01 RX ORDER — OXYCODONE HYDROCHLORIDE 5 MG/1
5 TABLET ORAL ONCE
Status: DISCONTINUED | OUTPATIENT
Start: 2022-01-01 | End: 2022-01-01 | Stop reason: CLARIF

## 2022-01-01 RX ORDER — POLYETHYLENE GLYCOL 3350 17 G/17G
17 POWDER, FOR SOLUTION ORAL DAILY PRN
Status: DISCONTINUED | OUTPATIENT
Start: 2022-01-01 | End: 2022-01-01 | Stop reason: HOSPADM

## 2022-01-01 RX ORDER — SODIUM CHLORIDE 9 MG/ML
INJECTION, SOLUTION INTRAVENOUS PRN
Status: DISCONTINUED | OUTPATIENT
Start: 2022-01-01 | End: 2022-01-01 | Stop reason: HOSPADM

## 2022-01-01 RX ORDER — SODIUM CHLORIDE 0.9 % (FLUSH) 0.9 %
5-40 SYRINGE (ML) INJECTION PRN
Status: DISCONTINUED | OUTPATIENT
Start: 2022-01-01 | End: 2022-01-01 | Stop reason: HOSPADM

## 2022-01-01 RX ORDER — LIDOCAINE 4 G/G
2 PATCH TOPICAL DAILY
Qty: 60 PATCH | Refills: 0 | Status: SHIPPED | OUTPATIENT
Start: 2022-01-01 | End: 2022-01-01

## 2022-01-01 RX ORDER — FUROSEMIDE 10 MG/ML
40 INJECTION INTRAMUSCULAR; INTRAVENOUS ONCE
Status: DISCONTINUED | OUTPATIENT
Start: 2022-01-01 | End: 2022-01-01

## 2022-01-01 RX ORDER — LACTOBACILLUS RHAMNOSUS GG 10B CELL
1 CAPSULE ORAL 2 TIMES DAILY
Status: DISCONTINUED | OUTPATIENT
Start: 2022-01-01 | End: 2022-01-01 | Stop reason: HOSPADM

## 2022-01-01 RX ORDER — SODIUM CHLORIDE 0.9 % (FLUSH) 0.9 %
5-40 SYRINGE (ML) INJECTION EVERY 12 HOURS SCHEDULED
Status: DISCONTINUED | OUTPATIENT
Start: 2022-01-01 | End: 2022-01-01 | Stop reason: HOSPADM

## 2022-01-01 RX ORDER — AMOXICILLIN AND CLAVULANATE POTASSIUM 875; 125 MG/1; MG/1
TABLET, FILM COATED ORAL
COMMUNITY
Start: 2022-01-01

## 2022-01-01 RX ORDER — ONDANSETRON 2 MG/ML
4 INJECTION INTRAMUSCULAR; INTRAVENOUS EVERY 6 HOURS PRN
Status: DISCONTINUED | OUTPATIENT
Start: 2022-01-01 | End: 2022-01-01 | Stop reason: HOSPADM

## 2022-01-01 RX ADMIN — SODIUM CHLORIDE, PRESERVATIVE FREE 10 ML: 5 INJECTION INTRAVENOUS at 08:06

## 2022-01-01 RX ADMIN — HEPARIN SODIUM 5000 UNITS: 5000 INJECTION INTRAVENOUS; SUBCUTANEOUS at 05:57

## 2022-01-01 RX ADMIN — METOPROLOL TARTRATE 50 MG: 50 TABLET, FILM COATED ORAL at 08:03

## 2022-01-01 RX ADMIN — HEPARIN SODIUM 5000 UNITS: 5000 INJECTION INTRAVENOUS; SUBCUTANEOUS at 05:15

## 2022-01-01 RX ADMIN — WATER 1000 MG: 1 INJECTION INTRAMUSCULAR; INTRAVENOUS; SUBCUTANEOUS at 19:43

## 2022-01-01 RX ADMIN — HEPARIN SODIUM 5000 UNITS: 5000 INJECTION INTRAVENOUS; SUBCUTANEOUS at 13:30

## 2022-01-01 RX ADMIN — ALBUTEROL SULFATE 2.5 MG: 2.5 SOLUTION RESPIRATORY (INHALATION) at 02:37

## 2022-01-01 RX ADMIN — Medication 1 CAPSULE: at 08:03

## 2022-01-01 RX ADMIN — VANCOMYCIN HYDROCHLORIDE 1250 MG: 10 INJECTION, POWDER, LYOPHILIZED, FOR SOLUTION INTRAVENOUS at 16:23

## 2022-01-01 RX ADMIN — ONDANSETRON 4 MG: 2 INJECTION INTRAMUSCULAR; INTRAVENOUS at 21:01

## 2022-01-01 RX ADMIN — CLOPIDOGREL BISULFATE 75 MG: 75 TABLET ORAL at 08:03

## 2022-01-01 RX ADMIN — HEPARIN SODIUM 5000 UNITS: 5000 INJECTION INTRAVENOUS; SUBCUTANEOUS at 22:39

## 2022-01-01 RX ADMIN — METOPROLOL TARTRATE 50 MG: 50 TABLET, FILM COATED ORAL at 19:28

## 2022-01-01 RX ADMIN — DILTIAZEM HYDROCHLORIDE 5 MG: 5 INJECTION INTRAVENOUS at 22:39

## 2022-01-01 RX ADMIN — CALCIUM 500 MG: 500 TABLET ORAL at 08:04

## 2022-01-01 RX ADMIN — TROSPIUM CHLORIDE 20 MG: 20 TABLET, FILM COATED ORAL at 19:28

## 2022-01-01 RX ADMIN — SODIUM CHLORIDE 1000 ML: 9 INJECTION, SOLUTION INTRAVENOUS at 17:01

## 2022-01-01 RX ADMIN — AZITHROMYCIN MONOHYDRATE 500 MG: 500 INJECTION, POWDER, LYOPHILIZED, FOR SOLUTION INTRAVENOUS at 19:48

## 2022-01-01 RX ADMIN — Medication 1 CAPSULE: at 22:25

## 2022-01-01 RX ADMIN — IPRATROPIUM BROMIDE AND ALBUTEROL SULFATE 1 AMPULE: 2.5; .5 SOLUTION RESPIRATORY (INHALATION) at 06:41

## 2022-01-01 RX ADMIN — Medication 1 CAPSULE: at 23:07

## 2022-01-01 RX ADMIN — CALCIUM 500 MG: 500 TABLET ORAL at 08:03

## 2022-01-01 RX ADMIN — HEPARIN SODIUM 5000 UNITS: 5000 INJECTION INTRAVENOUS; SUBCUTANEOUS at 13:50

## 2022-01-01 RX ADMIN — METOPROLOL TARTRATE 50 MG: 50 TABLET, FILM COATED ORAL at 23:07

## 2022-01-01 RX ADMIN — METOPROLOL TARTRATE 50 MG: 50 TABLET, FILM COATED ORAL at 22:25

## 2022-01-01 RX ADMIN — SODIUM CHLORIDE, PRESERVATIVE FREE 10 ML: 5 INJECTION INTRAVENOUS at 08:07

## 2022-01-01 RX ADMIN — WATER 1000 MG: 1 INJECTION INTRAMUSCULAR; INTRAVENOUS; SUBCUTANEOUS at 17:13

## 2022-01-01 RX ADMIN — AZITHROMYCIN MONOHYDRATE 250 MG: 500 INJECTION, POWDER, LYOPHILIZED, FOR SOLUTION INTRAVENOUS at 17:17

## 2022-01-01 RX ADMIN — HEPARIN SODIUM 5000 UNITS: 5000 INJECTION INTRAVENOUS; SUBCUTANEOUS at 23:07

## 2022-01-01 RX ADMIN — Medication 5 MG/HR: at 22:44

## 2022-01-01 RX ADMIN — Medication 5 MG: at 19:28

## 2022-01-01 RX ADMIN — TROSPIUM CHLORIDE 20 MG: 20 TABLET, FILM COATED ORAL at 22:25

## 2022-01-01 RX ADMIN — HEPARIN SODIUM 5000 UNITS: 5000 INJECTION INTRAVENOUS; SUBCUTANEOUS at 22:25

## 2022-01-01 RX ADMIN — SODIUM CHLORIDE, PRESERVATIVE FREE 10 ML: 5 INJECTION INTRAVENOUS at 23:08

## 2022-01-01 RX ADMIN — Medication 5 MG: at 23:07

## 2022-01-01 RX ADMIN — Medication 2000 UNITS: at 08:03

## 2022-01-01 RX ADMIN — SODIUM CHLORIDE, POTASSIUM CHLORIDE, SODIUM LACTATE AND CALCIUM CHLORIDE: 600; 310; 30; 20 INJECTION, SOLUTION INTRAVENOUS at 17:15

## 2022-01-01 RX ADMIN — MONTELUKAST 10 MG: 10 TABLET, FILM COATED ORAL at 08:03

## 2022-01-01 RX ADMIN — Medication 5 MG: at 22:24

## 2022-01-01 RX ADMIN — WATER 1000 MG: 1 INJECTION INTRAMUSCULAR; INTRAVENOUS; SUBCUTANEOUS at 16:24

## 2022-01-01 RX ADMIN — SODIUM CHLORIDE, POTASSIUM CHLORIDE, SODIUM LACTATE AND CALCIUM CHLORIDE: 600; 310; 30; 20 INJECTION, SOLUTION INTRAVENOUS at 23:21

## 2022-01-01 RX ADMIN — ACETAMINOPHEN 650 MG: 325 TABLET ORAL at 01:52

## 2022-01-01 RX ADMIN — Medication 1 CAPSULE: at 19:28

## 2022-01-01 RX ADMIN — SODIUM POLYSTYRENE SULFONATE 15 G: 15 SUSPENSION ORAL; RECTAL at 08:04

## 2022-01-01 RX ADMIN — HEPARIN SODIUM 5000 UNITS: 5000 INJECTION INTRAVENOUS; SUBCUTANEOUS at 05:41

## 2022-01-01 SDOH — ECONOMIC STABILITY: FOOD INSECURITY: WITHIN THE PAST 12 MONTHS, YOU WORRIED THAT YOUR FOOD WOULD RUN OUT BEFORE YOU GOT MONEY TO BUY MORE.: NEVER TRUE

## 2022-01-01 SDOH — ECONOMIC STABILITY: FOOD INSECURITY: WITHIN THE PAST 12 MONTHS, THE FOOD YOU BOUGHT JUST DIDN'T LAST AND YOU DIDN'T HAVE MONEY TO GET MORE.: NEVER TRUE

## 2022-01-01 ASSESSMENT — ENCOUNTER SYMPTOMS
CHEST TIGHTNESS: 0
ABDOMINAL DISTENTION: 0
COUGH: 0
WHEEZING: 0
TROUBLE SWALLOWING: 0
VOMITING: 1
NAUSEA: 1
DIARRHEA: 0
SHORTNESS OF BREATH: 1
CONSTIPATION: 0
SORE THROAT: 0
DIARRHEA: 1
BACK PAIN: 0
APNEA: 0
VOMITING: 0
SHORTNESS OF BREATH: 0
ABDOMINAL PAIN: 0

## 2022-01-01 ASSESSMENT — PATIENT HEALTH QUESTIONNAIRE - PHQ9
SUM OF ALL RESPONSES TO PHQ QUESTIONS 1-9: 0
SUM OF ALL RESPONSES TO PHQ9 QUESTIONS 1 & 2: 0
1. LITTLE INTEREST OR PLEASURE IN DOING THINGS: 0
2. FEELING DOWN, DEPRESSED OR HOPELESS: 0

## 2022-01-01 ASSESSMENT — PAIN - FUNCTIONAL ASSESSMENT: PAIN_FUNCTIONAL_ASSESSMENT: NONE - DENIES PAIN

## 2022-01-01 ASSESSMENT — PAIN DESCRIPTION - LOCATION: LOCATION: SHOULDER

## 2022-01-01 ASSESSMENT — SOCIAL DETERMINANTS OF HEALTH (SDOH): HOW HARD IS IT FOR YOU TO PAY FOR THE VERY BASICS LIKE FOOD, HOUSING, MEDICAL CARE, AND HEATING?: NOT VERY HARD

## 2022-01-01 ASSESSMENT — PAIN SCALES - GENERAL: PAINLEVEL_OUTOF10: 9

## 2022-03-18 NOTE — PROGRESS NOTES
Chief Complaint   Patient presents with    6 Month Follow-Up       HPI: Patient is here today for 6-month follow-up of medical problems history of a stroke hypertension CKD 3. Comments that the pandemic has been very rough on her she is also having a lot more back pain. Really pretty severe pain at times in the thoracic area. Past Medical History:   Diagnosis Date    Arthritis     Cerebral artery occlusion with cerebral infarction [facility])     possible this admit     Chronic kidney disease     Essential hypertension 08/14/2017    Hypertension     Impacted cerumen of right ear 08/14/2017    Movement disorder     Palliative care patient 07/20/2021       Past Surgical History:   Procedure Laterality Date    APPENDECTOMY      BREAST BIOPSY Bilateral     CHOLECYSTECTOMY      HIP SURGERY Bilateral     TONSILLECTOMY         No family history on file. Social History     Socioeconomic History    Marital status:      Spouse name: Not on file    Number of children: Not on file    Years of education: Not on file    Highest education level: Not on file   Occupational History    Not on file   Tobacco Use    Smoking status: Never Smoker    Smokeless tobacco: Never Used   Substance and Sexual Activity    Alcohol use: Yes     Alcohol/week: 7.0 standard drinks     Types: 7 Glasses of wine per week    Drug use: No    Sexual activity: Never   Other Topics Concern    Not on file   Social History Narrative    Not on file     Social Determinants of Health     Financial Resource Strain: Low Risk     Difficulty of Paying Living Expenses: Not very hard   Food Insecurity: No Food Insecurity    Worried About Running Out of Food in the Last Year: Never true    Otilio of Food in the Last Year: Never true   Transportation Needs:     Lack of Transportation (Medical): Not on file    Lack of Transportation (Non-Medical):  Not on file   Physical Activity:     Days of Exercise per Week: Not on file    Minutes of Exercise per Session: Not on file   Stress:     Feeling of Stress : Not on file   Social Connections:     Frequency of Communication with Friends and Family: Not on file    Frequency of Social Gatherings with Friends and Family: Not on file    Attends Mu-ism Services: Not on file    Active Member of Clubs or Organizations: Not on file    Attends Club or Organization Meetings: Not on file    Marital Status: Not on file   Intimate Partner Violence:     Fear of Current or Ex-Partner: Not on file    Emotionally Abused: Not on file    Physically Abused: Not on file    Sexually Abused: Not on file   Housing Stability:     Unable to Pay for Housing in the Last Year: Not on file    Number of Places Lived in the Last Year: Not on file    Unstable Housing in the Last Year: Not on file       No Known Allergies    Current Outpatient Medications   Medication Sig Dispense Refill    lidocaine 4 % external patch Place 2 patches onto the skin daily 12 hours on and 12 hours off 60 patch 0    metoprolol tartrate (LOPRESSOR) 50 MG tablet TAKE ONE (1) TABLET TWICE DAILY 180 tablet 3    montelukast (SINGULAIR) 10 MG tablet TAKE ONE TABLET BY MOUTH DAILY 90 tablet 3    clopidogrel (PLAVIX) 75 MG tablet TAKE ONE (1) TABLET BY MOUTH DAILY  90 tablet 3    solifenacin (VESICARE) 10 MG tablet TAKE ONE (1) TABLET BY MOUTH EVERY MORNING FOR BLADDER 90 tablet 3    bisoprolol-hydroCHLOROthiazide (ZIAC) 2.5-6.25 MG per tablet TAKE ONE (1) TABLET BY MOUTH DAILY  90 tablet 3    fluticasone (FLONASE) 50 MCG/ACT nasal spray USE 2 SPRAY IN EACH NOSTRIL DAILY AS NEEDED 16 g 3    calcium carbonate (OSCAL) 500 MG TABS tablet Take 500 mg by mouth daily      saccharomyces boulardii (FLORASTOR) 250 MG capsule Take 250 mg by mouth 2 times daily      Cholecalciferol (VITAMIN D3) 2000 UNITS CAPS Take 1,000 Units by mouth daily       melatonin 5 MG TABS tablet Take 5 mg by mouth nightly        No current facility-administered medications for this visit. Review of Systems    BP (!) 120/58   Pulse 66   Ht 5' 4\" (1.626 m)   Wt 124 lb (56.2 kg)   SpO2 96%   BMI 21.28 kg/m²   BP Readings from Last 7 Encounters:   03/18/22 (!) 120/58   08/19/21 126/74   07/27/21 110/60   07/21/21 130/68   02/08/21 129/72   07/20/20 126/64   10/29/19 120/62     Wt Readings from Last 7 Encounters:   03/18/22 124 lb (56.2 kg)   08/19/21 124 lb (56.2 kg)   07/27/21 125 lb (56.7 kg)   07/19/21 126 lb (57.2 kg)   02/08/21 129 lb (58.5 kg)   07/20/20 130 lb (59 kg)   10/29/19 137 lb (62.1 kg)     BMI Readings from Last 7 Encounters:   03/18/22 21.28 kg/m²   08/19/21 21.28 kg/m²   07/27/21 21.46 kg/m²   07/19/21 21.63 kg/m²   02/08/21 22.14 kg/m²   07/20/20 22.31 kg/m²   10/29/19 23.52 kg/m²     Resp Readings from Last 7 Encounters:   07/21/21 18   07/13/19 16   07/07/19 15   04/19/18 18   01/28/17 18       Physical Exam  Constitutional:       General: She is not in acute distress. Comments: Patient appears uncomfortable appears to be in some pain   Eyes:      General: No scleral icterus. Cardiovascular:      Heart sounds: Normal heart sounds. Pulmonary:      Breath sounds: Normal breath sounds. Musculoskeletal:      Cervical back: Neck supple. Comments: Chronic arthritis changes   Lymphadenopathy:      Cervical: No cervical adenopathy. Skin:     Findings: No rash.    Psychiatric:      Comments: Tired and in pain         Results for orders placed or performed during the hospital encounter of 07/19/21   Culture, Urine    Specimen: Urine, clean catch   Result Value Ref Range    Urine Culture, Routine >100,000 CFU/ml (A)     Organism Escherichia coli (A)     Urine Culture, Routine Heavy growth        Susceptibility    Escherichia coli - BACTERIAL SUSCEPTIBILITY PANEL BY MYLES     ampicillin <=2 Sensitive mcg/mL     aztreonam <=1 Sensitive mcg/mL     ceFAZolin <=4 Sensitive mcg/mL     cefepime <=1 Sensitive mcg/mL cefTRIAXone <=1 Sensitive mcg/mL     ertapenem <=0.5 Sensitive mcg/mL     gentamicin <=1 Sensitive mcg/mL     levofloxacin <=0.12 Sensitive mcg/mL     meropenem <=0.25 Sensitive mcg/mL     nitrofurantoin <=16 Sensitive mcg/mL     piperacillin-tazobactam <=4 Sensitive mcg/mL     trimethoprim-sulfamethoxazole <=20 Sensitive mcg/mL   COVID-19, Rapid    Specimen: Nasopharyngeal Swab   Result Value Ref Range    SARS-CoV-2, NAAT Not Detected Not Detected   Comprehensive Metabolic Panel   Result Value Ref Range    Sodium 141 136 - 145 mmol/L    Potassium 4.8 3.5 - 5.0 mmol/L    Chloride 103 98 - 111 mmol/L    CO2 30 (H) 22 - 29 mmol/L    Anion Gap 8 7 - 19 mmol/L    Glucose 89 74 - 109 mg/dL    BUN 31 (H) 8 - 23 mg/dL    CREATININE 1.0 (H) 0.5 - 0.9 mg/dL    GFR Non-African American 52 (A) >60    GFR African American >59 >59    Calcium 9.9 (H) 8.2 - 9.6 mg/dL    Total Protein 7.3 6.6 - 8.7 g/dL    Albumin 3.9 3.5 - 5.2 g/dL    Total Bilirubin 0.7 0.2 - 1.2 mg/dL    Alkaline Phosphatase 75 35 - 104 U/L    ALT 12 5 - 33 U/L    AST 23 5 - 32 U/L   CBC Auto Differential   Result Value Ref Range    WBC 8.2 4.8 - 10.8 K/uL    RBC 4.57 4.20 - 5.40 M/uL    Hemoglobin 14.1 12.0 - 16.0 g/dL    Hematocrit 44.2 37.0 - 47.0 %    MCV 96.7 81.0 - 99.0 fL    MCH 30.9 27.0 - 31.0 pg    MCHC 31.9 (L) 33.0 - 37.0 g/dL    RDW 13.4 11.5 - 14.5 %    Platelets 482 267 - 264 K/uL    MPV 10.5 9.4 - 12.3 fL    Neutrophils % 63.9 50.0 - 65.0 %    Lymphocytes % 20.6 20.0 - 40.0 %    Monocytes % 10.4 (H) 0.0 - 10.0 %    Eosinophils % 3.2 0.0 - 5.0 %    Basophils % 1.3 (H) 0.0 - 1.0 %    Neutrophils Absolute 5.2 1.5 - 7.5 K/uL    Immature Granulocytes # 0.1 K/uL    Lymphocytes Absolute 1.7 1.1 - 4.5 K/uL    Monocytes Absolute 0.90 0.00 - 0.90 K/uL    Eosinophils Absolute 0.30 0.00 - 0.60 K/uL    Basophils Absolute 0.10 0.00 - 0.20 K/uL   Protime-INR   Result Value Ref Range    Protime 14.0 12.0 - 14.6 sec    INR 1.06 0.88 - 1.18   Urinalysis Reflex to Culture    Specimen: Urine, clean catch   Result Value Ref Range    Color, UA YELLOW Straw/Yellow    Clarity, UA CLOUDY (A) Clear    Glucose, Ur Negative Negative mg/dL    Bilirubin Urine Negative Negative    Ketones, Urine Negative Negative mg/dL    Specific Gravity, UA 1.016 1.005 - 1.030    Blood, Urine TRACE (A) Negative    pH, UA 7.0 5.0 - 8.0    Protein, UA Negative Negative mg/dL    Urobilinogen, Urine 1.0 <2.0 E.U./dL    Nitrite, Urine POSITIVE (A) Negative    Leukocyte Esterase, Urine LARGE (A) Negative   Troponin   Result Value Ref Range    Troponin <0.01 0.00 - 0.03 ng/mL   Microscopic Urinalysis   Result Value Ref Range    Bacteria, UA 4+ (A) None Seen /HPF    Crystals, UA NEG (A) None Seen /HPF    Hyaline Casts, UA 0 0 - 8 /HPF    WBC,  (H) 0 - 5 /HPF    RBC, UA 4 0 - 4 /HPF    Epithelial Cells, UA 0 0 - 5 /HPF   Lipid Panel   Result Value Ref Range    Cholesterol, Total 118 (L) 160 - 199 mg/dL    Triglycerides 70 0 - 149 mg/dL    HDL 60 (L) 65 - 121 mg/dL    LDL Calculated 44 <100 mg/dL   Hemoglobin A1C   Result Value Ref Range    Hemoglobin A1C 5.3 4.0 - 6.0 %   TSH WITH REFLEX TO FT4   Result Value Ref Range    TSH Reflex FT4 5.12 0.35 - 5.50 uIU/mL   Basic Metabolic Panel w/ Reflex to MG   Result Value Ref Range    Sodium 140 136 - 145 mmol/L    Potassium reflex Magnesium 4.3 3.5 - 5.0 mmol/L    Chloride 101 98 - 111 mmol/L    CO2 31 (H) 22 - 29 mmol/L    Anion Gap 8 7 - 19 mmol/L    Glucose 91 74 - 109 mg/dL    BUN 26 (H) 8 - 23 mg/dL    CREATININE 0.8 0.5 - 0.9 mg/dL    GFR Non-African American >60 >60    GFR African American >59 >59    Calcium 9.5 8.2 - 9.6 mg/dL   CBC   Result Value Ref Range    WBC 8.7 4.8 - 10.8 K/uL    RBC 4.38 4.20 - 5.40 M/uL    Hemoglobin 13.5 12.0 - 16.0 g/dL    Hematocrit 41.8 37.0 - 47.0 %    MCV 95.4 81.0 - 99.0 fL    MCH 30.8 27.0 - 31.0 pg    MCHC 32.3 (L) 33.0 - 37.0 g/dL    RDW 13.2 11.5 - 14.5 %    Platelets 372 687 - 516 K/uL    MPV 10.3 9.4 - 12.3 fL   T4, Free   Result Value Ref Range    T4 Free 1.35 0.93 - 1.70 ng/dL   EKG 12 Lead   Result Value Ref Range    P-R Interval 224 ms    QRS Duration 92 ms    Q-T Interval 442 ms    QTc Calculation (Bazett) 435 ms    P Axis 90 degrees    T Axis 73 degrees   ECHO Complete 2D W Doppler W Color   Result Value Ref Range    Left Ventricular Ejection Fraction 58     LVEF MODALITY ECHO        ASSESSMENT/ PLAN:  1. History of completed stroke  Images well she is very cautious about falling uses a walker she continues with Lopressor Plavix we need to clarify if she is on Ziac or Lopressor they are both listed today. We do not want her on 2 beta-blockers we need to clarify this I think her Dayanna Rowe was changed to Lopressor and has just not been taken off the chart. Is a tiny dose of Ziac    2. Primary hypertension  We will clarify but plan to stay on Lopressor twice a day follow-up    3. Chronic bilateral low back pain without sciatica  Wrote for lidocaine patch and x-ray she may have a compression fracture advanced age may have an osteoporotic compression fracture or other process going on  - lidocaine 4 % external patch; Place 2 patches onto the skin daily 12 hours on and 12 hours off  Dispense: 60 patch; Refill: 0  - XR LUMBAR SPINE (2-3 VIEWS); Future  - XR THORACIC SPINE (2 VIEWS); Future    4. Stage 3b chronic kidney disease ([address]  To avoid NSAIDs with CKD 3 follow closely    5. Chronic midline thoracic back pain  Again check an x-ray  - XR THORACIC SPINE (2 VIEWS); Future    6.  OAB-continue with Vesicare

## 2022-05-05 NOTE — TELEPHONE ENCOUNTER
Her daughter-in-law, Pavel, reports that back, left side pain is getting worse and you had mentioned adding back a medication to help with this (she is not sure, but it may have been celebrex). She is taking 2 tylenol arthritis every morning and using solanpas patches but these are not helping very much.

## 2022-05-06 NOTE — TELEPHONE ENCOUNTER
I had mentioned we might get other testing but we could add back celebrex 1 dialy with food and have them update me next week to her status

## 2022-07-29 PROBLEM — E86.0 DEHYDRATION: Status: ACTIVE | Noted: 2022-01-01

## 2022-07-29 PROBLEM — E87.20 LACTIC ACIDOSIS: Status: ACTIVE | Noted: 2022-01-01

## 2022-07-29 PROBLEM — R91.8 RIGHT LOWER LOBE PULMONARY INFILTRATE: Status: ACTIVE | Noted: 2022-01-01

## 2022-07-29 PROBLEM — N17.9 AKI (ACUTE KIDNEY INJURY) (HCC): Status: ACTIVE | Noted: 2022-01-01

## 2022-07-29 NOTE — PROGRESS NOTES
Chief Complaint   Patient presents with    Dizziness    Choking     States that it feels like something is stuck in her throat states it's been since last fall     Other     Low B/P// recently had cat bite was given a shot of tetanus     Diarrhea       HPI: Patient's family called patient with hypotension not feeling well at all and we had her come in we had suggested the ER or the office she preferred the office she is very weak we are getting a low blood pressure she cannot walk on her own today she is so weak she describes her food getting stuck in her throat. Low blood pressure also been having a lot of diarrhea not able to eat because of all this does not feel well    Past Medical History:   Diagnosis Date    Arthritis     Cerebral artery occlusion with cerebral infarction [facility])     possible this admit     Chronic kidney disease     Essential hypertension 08/14/2017    Hypertension     Impacted cerumen of right ear 08/14/2017    Movement disorder     Palliative care patient 07/20/2021       Past Surgical History:   Procedure Laterality Date    APPENDECTOMY      BREAST BIOPSY Bilateral     CHOLECYSTECTOMY      HIP SURGERY Bilateral     TONSILLECTOMY         No family history on file. Social History     Socioeconomic History    Marital status:      Spouse name: Not on file    Number of children: Not on file    Years of education: Not on file    Highest education level: Not on file   Occupational History    Not on file   Tobacco Use    Smoking status: Never    Smokeless tobacco: Never   Substance and Sexual Activity    Alcohol use:  Yes     Alcohol/week: 7.0 standard drinks     Types: 7 Glasses of wine per week    Drug use: No    Sexual activity: Never   Other Topics Concern    Not on file   Social History Narrative    Not on file     Social Determinants of Health     Financial Resource Strain: Low Risk     Difficulty of Paying Living Expenses: Not very hard   Food Insecurity: No Food Insecurity Worried About Running Out of Food in the Last Year: Never true    Ran Out of Food in the Last Year: Never true   Transportation Needs: Not on file   Physical Activity: Not on file   Stress: Not on file   Social Connections: Not on file   Intimate Partner Violence: Not on file   Housing Stability: Not on file       No Known Allergies    Current Outpatient Medications   Medication Sig Dispense Refill    celecoxib (CELEBREX) 100 MG capsule TAKE ONE (1) CAPSULE BY MOUTH DAILY (Patient not taking: Reported on 7/29/2022) 30 capsule 2    metoprolol tartrate (LOPRESSOR) 50 MG tablet TAKE ONE (1) TABLET TWICE DAILY 180 tablet 3    montelukast (SINGULAIR) 10 MG tablet TAKE ONE TABLET BY MOUTH DAILY 90 tablet 3    clopidogrel (PLAVIX) 75 MG tablet TAKE ONE (1) TABLET BY MOUTH DAILY  90 tablet 3    solifenacin (VESICARE) 10 MG tablet TAKE ONE (1) TABLET BY MOUTH EVERY MORNING FOR BLADDER 90 tablet 3    fluticasone (FLONASE) 50 MCG/ACT nasal spray USE 2 SPRAY IN EACH NOSTRIL DAILY AS NEEDED 16 g 3    calcium carbonate (OSCAL) 500 MG TABS tablet Take 500 mg by mouth daily      saccharomyces boulardii (FLORASTOR) 250 MG capsule Take 250 mg by mouth 2 times daily      Cholecalciferol (VITAMIN D3) 2000 UNITS CAPS Take 1,000 Units by mouth daily       melatonin 5 MG TABS tablet Take 5 mg by mouth nightly       amoxicillin-clavulanate (AUGMENTIN) 875-125 MG per tablet TAKE 1 TABLET BY MOUTH TWICE DAILY FOR 10 DAYS       No current facility-administered medications for this visit.        Review of Systems    BP 90/60   Pulse (!) 111   SpO2 99%   BP Readings from Last 7 Encounters:   08/01/22 107/69   07/29/22 90/60   03/18/22 (!) 120/58   08/19/21 126/74   07/27/21 110/60   07/21/21 130/68   02/08/21 129/72     Wt Readings from Last 7 Encounters:   07/31/22 120 lb 6.4 oz (54.6 kg)   03/18/22 124 lb (56.2 kg)   08/19/21 124 lb (56.2 kg)   07/27/21 125 lb (56.7 kg)   07/19/21 126 lb (57.2 kg)   02/08/21 129 lb (58.5 kg)   07/20/20 130 lb (59 kg)     BMI Readings from Last 7 Encounters:   07/31/22 20.67 kg/m²   03/18/22 21.28 kg/m²   08/19/21 21.28 kg/m²   07/27/21 21.46 kg/m²   07/19/21 21.63 kg/m²   02/08/21 22.14 kg/m²   07/20/20 22.31 kg/m²     Resp Readings from Last 7 Encounters:   08/01/22 22   07/21/21 18   07/13/19 16   07/07/19 15   04/19/18 18   01/28/17 18       Physical Exam  Constitutional:       General: She is not in acute distress. Eyes:      General: No scleral icterus. Cardiovascular:      Heart sounds: Normal heart sounds. Pulmonary:      Breath sounds: Normal breath sounds. Musculoskeletal:      Cervical back: Neck supple. Lymphadenopathy:      Cervical: No cervical adenopathy. Skin:     Findings: No rash.        Results for orders placed or performed during the hospital encounter of 07/19/21   Culture, Urine    Specimen: Urine, clean catch   Result Value Ref Range    Urine Culture, Routine >100,000 CFU/ml (A)     Organism Escherichia coli (A)     Urine Culture, Routine Heavy growth        Susceptibility    Escherichia coli - BACTERIAL SUSCEPTIBILITY PANEL BY MYLES     ampicillin <=2 Sensitive mcg/mL     aztreonam <=1 Sensitive mcg/mL     ceFAZolin <=4 Sensitive mcg/mL     cefepime <=1 Sensitive mcg/mL     cefTRIAXone <=1 Sensitive mcg/mL     ertapenem <=0.5 Sensitive mcg/mL     gentamicin <=1 Sensitive mcg/mL     levofloxacin <=0.12 Sensitive mcg/mL     meropenem <=0.25 Sensitive mcg/mL     nitrofurantoin <=16 Sensitive mcg/mL     piperacillin-tazobactam <=4 Sensitive mcg/mL     trimethoprim-sulfamethoxazole <=20 Sensitive mcg/mL   COVID-19, Rapid    Specimen: Nasopharyngeal Swab   Result Value Ref Range    SARS-CoV-2, NAAT Not Detected Not Detected   Comprehensive Metabolic Panel   Result Value Ref Range    Sodium 141 136 - 145 mmol/L    Potassium 4.8 3.5 - 5.0 mmol/L    Chloride 103 98 - 111 mmol/L    CO2 30 (H) 22 - 29 mmol/L    Anion Gap 8 7 - 19 mmol/L    Glucose 89 74 - 109 mg/dL    BUN 31 (H) 8 - 23 mg/dL Creatinine 1.0 (H) 0.5 - 0.9 mg/dL    GFR Non-African American 52 (A) >60    GFR African American >59 >59    Calcium 9.9 (H) 8.2 - 9.6 mg/dL    Total Protein 7.3 6.6 - 8.7 g/dL    Albumin 3.9 3.5 - 5.2 g/dL    Total Bilirubin 0.7 0.2 - 1.2 mg/dL    Alkaline Phosphatase 75 35 - 104 U/L    ALT 12 5 - 33 U/L    AST 23 5 - 32 U/L   CBC Auto Differential   Result Value Ref Range    WBC 8.2 4.8 - 10.8 K/uL    RBC 4.57 4.20 - 5.40 M/uL    Hemoglobin 14.1 12.0 - 16.0 g/dL    Hematocrit 44.2 37.0 - 47.0 %    MCV 96.7 81.0 - 99.0 fL    MCH 30.9 27.0 - 31.0 pg    MCHC 31.9 (L) 33.0 - 37.0 g/dL    RDW 13.4 11.5 - 14.5 %    Platelets 305 104 - 429 K/uL    MPV 10.5 9.4 - 12.3 fL    Neutrophils % 63.9 50.0 - 65.0 %    Lymphocytes % 20.6 20.0 - 40.0 %    Monocytes % 10.4 (H) 0.0 - 10.0 %    Eosinophils % 3.2 0.0 - 5.0 %    Basophils % 1.3 (H) 0.0 - 1.0 %    Neutrophils Absolute 5.2 1.5 - 7.5 K/uL    Immature Granulocytes # 0.1 K/uL    Lymphocytes Absolute 1.7 1.1 - 4.5 K/uL    Monocytes Absolute 0.90 0.00 - 0.90 K/uL    Eosinophils Absolute 0.30 0.00 - 0.60 K/uL    Basophils Absolute 0.10 0.00 - 0.20 K/uL   Protime-INR   Result Value Ref Range    Protime 14.0 12.0 - 14.6 sec    INR 1.06 0.88 - 1.18   Urinalysis Reflex to Culture    Specimen: Urine, clean catch   Result Value Ref Range    Color, UA YELLOW Straw/Yellow    Clarity, UA CLOUDY (A) Clear    Glucose, Ur Negative Negative mg/dL    Bilirubin Urine Negative Negative    Ketones, Urine Negative Negative mg/dL    Specific Gravity, UA 1.016 1.005 - 1.030    Blood, Urine TRACE (A) Negative    pH, UA 7.0 5.0 - 8.0    Protein, UA Negative Negative mg/dL    Urobilinogen, Urine 1.0 <2.0 E.U./dL    Nitrite, Urine POSITIVE (A) Negative    Leukocyte Esterase, Urine LARGE (A) Negative   Troponin   Result Value Ref Range    Troponin <0.01 0.00 - 0.03 ng/mL   Microscopic Urinalysis   Result Value Ref Range    Bacteria, UA 4+ (A) None Seen /HPF    Crystals, UA NEG (A) None Seen /HPF Hyaline Casts, UA 0 0 - 8 /HPF    WBC,  (H) 0 - 5 /HPF    RBC, UA 4 0 - 4 /HPF    Epithelial Cells, UA 0 0 - 5 /HPF   Lipid Panel   Result Value Ref Range    Cholesterol, Total 118 (L) 160 - 199 mg/dL    Triglycerides 70 0 - 149 mg/dL    HDL 60 (L) 65 - 121 mg/dL    LDL Calculated 44 <100 mg/dL   Hemoglobin A1C   Result Value Ref Range    Hemoglobin A1C 5.3 4.0 - 6.0 %   TSH WITH REFLEX TO FT4   Result Value Ref Range    TSH Reflex FT4 5.12 0.35 - 5.50 uIU/mL   Basic Metabolic Panel w/ Reflex to MG   Result Value Ref Range    Sodium 140 136 - 145 mmol/L    Potassium reflex Magnesium 4.3 3.5 - 5.0 mmol/L    Chloride 101 98 - 111 mmol/L    CO2 31 (H) 22 - 29 mmol/L    Anion Gap 8 7 - 19 mmol/L    Glucose 91 74 - 109 mg/dL    BUN 26 (H) 8 - 23 mg/dL    Creatinine 0.8 0.5 - 0.9 mg/dL    GFR Non-African American >60 >60    GFR African American >59 >59    Calcium 9.5 8.2 - 9.6 mg/dL   CBC   Result Value Ref Range    WBC 8.7 4.8 - 10.8 K/uL    RBC 4.38 4.20 - 5.40 M/uL    Hemoglobin 13.5 12.0 - 16.0 g/dL    Hematocrit 41.8 37.0 - 47.0 %    MCV 95.4 81.0 - 99.0 fL    MCH 30.8 27.0 - 31.0 pg    MCHC 32.3 (L) 33.0 - 37.0 g/dL    RDW 13.2 11.5 - 14.5 %    Platelets 708 196 - 390 K/uL    MPV 10.3 9.4 - 12.3 fL   T4, Free   Result Value Ref Range    T4 Free 1.35 0.93 - 1.70 ng/dL   EKG 12 Lead   Result Value Ref Range    P-R Interval 224 ms    QRS Duration 92 ms    Q-T Interval 442 ms    QTc Calculation (Bazett) 435 ms    P Axis 90 degrees    T Axis 73 degrees   ECHO Complete 2D W Doppler W Color   Result Value Ref Range    Left Ventricular Ejection Fraction 58     LVEF MODALITY ECHO        ASSESSMENT/ PLAN:  1.  Hypotension, unspecified hypotension type  I called and spoke to the ER explained we are sending the patient over she seems critically ill hypotensive diarrhea unable to walk elderly lives alone needs to go the emergency room for admission and assessment call the ER to give them a heads up so that they can more rapidly and efficiently assess her    2. Dysphagia, unspecified type  See above    3.  Diarrhea, unspecified type  See above

## 2022-07-29 NOTE — TELEPHONE ENCOUNTER
Her daughter-in-law is calling due to Mrs. Huynh BP has been low and she is lightheaded, dizzy at times and now having some diarrhea today. She is also having a new problem with swallowing.

## 2022-07-29 NOTE — TELEPHONE ENCOUNTER
Find out how low bp is and other sy -- have her stop her celebrex and dont take any advil/ aleve/ ibuprofen------if really sick feeling may need go to ER or I can add her later today and hold medicine in meantime  --- if feels really bad may need go to ER to get checked

## 2022-07-30 NOTE — PROGRESS NOTES
Speech Language Pathology  Facility/Department: [address] CARE   CLINICAL BEDSIDE SWALLOW EVALUATION  SPEECH PRODUCTION EVALUATION     NAME: Kip Laird  : 10/14/1926  MRN: [id number]    ADMISSION DATE: 2022  ADMITTING DIAGNOSIS: has Impacted cerumen of right ear; Hypertension; Overactive bladder; Primary osteoarthritis involving multiple joints; Unsteady gait; Ingrown toenail without infection; Chronic kidney disease, stage III (moderate) ([address] Pseudophakia; Tear film insufficiency; Acute cerebrovascular accident (CVA) ([address] Palliative care patient; Lactic acidosis; Dehydration; TANMAY (acute kidney injury) ([facility] and Right lower lobe pulmonary infiltrate on their problem list.    Date of Eval: 2022  Evaluating Therapist: ROXANN Del Castillo    Current Diet level:  Regular solid consistency with thin liquids    Reason for Referral  Kip Laird was referred for a bedside swallow evaluation to assess the efficiency of her swallow function, identify signs and symptoms of aspiration and make recommendations regarding safe dietary consistencies, effective compensatory strategies, and safe eating environment. Impression  Assessed patient's swallowing function. Patient exhibited slow oral prep of more solid consistencies as well as sluggish, mild-moderately decreased laryngeal elevation for swallow airway protection. Even so, no outward S/S penetration/aspiration was noted with any ice chip trial, regular solid consistency trial, mildly thick/nectar thick liquid trial, or thin H2O trial presented during evaluation this date. At this time, would recommend continuation regular solid consistency with thin liquids. Recommend meds whole in pudding/applesauce. Will continue to follow. Thank you for this consult. Treatment Plan  Requires SLP Intervention: Yes     Recommended Diet and Intervention  Diet Solids Recommendation: Regular solid  Liquid Consistency Recommendation:  Thin  Recommended Form of Meds: Meds whole in puree as able  Therapeutic Interventions: Patient/Family education;Diet tolerance monitoring     Compensatory Swallowing Strategies  Compensatory Swallowing Strategies: Upright as possible for all oral intake;Small bites/sips;Eat/Feed slowly; Alternate solids and liquids; Remain upright for 30-45 minutes after meals     Treatment/Goals  Timeframe for Short-term Goals: 1x/day for 3 days   Goal 1: Patient will tolerate regular solid consistency and thin liquids with min S/S penetration/aspiration during PO intake. Goal 2: Patient staff will follow swallow safety recommendations to decrease risk of penetration/aspiration during PO intake. Goal 3: Monitor speech production. General  Chart Reviewed: Yes  Behavior/Cognition: Alert; Cooperative  O2 Device: None (Room air)  Communication Observation: (Assessed patient's speech production. Patient exhibited slowed lingual movements during verbalizations. SLP still ranked functional intelligibility of speech for unfamiliar listeners at 100% in utterances with background noise present.)  Follows Directions: Simple   Dentition: Adequate  Patient Positioning: Upright in bed  Consistencies Administered: Ice chips;Regular solid; Nectar - straw; Thin - straw     Oral Motor Examination   Labial ROM: (Decreased, on the right, during labial protrusion trials.)  Labial Strength: (Adequate during labial compression trials.)  Labial Coordination: (Slowed movements were noted.)  Lingual ROM: (Adequate during lingual extension trials with full point achieved; adequate during lingual elevation trials without use of accessory jaw movement; adequate movements noted bilaterally.)  Lingual Symmetry: (Deviation was noted, to the right, during lingual extension trials.)  Lingual Strength: (Adequate )  Lingual Coordination: (Slowed movements were noted.)     Assessed patient's swallowing function with the following observations noted:     Oral Phase  Mastication:  Ice chips;Regular solid (Patient exhibited slow oral prep of ice chip trials-presented by SLP- and regular solid consistency trials presented independently.)  Oral Phase - Comment: Oral transit of ice chip trials primarily measured 1-2 seconds in length. Oral transit of regular solid consistency trials primarily measured 1-2 seconds in length and min oral cavity residue was noted post swallows; residue cleared from the mouth with additional dry swallows. Oral transit of nectar thick liquid trials and thin H2O trials, presented via straw by SLP and independently, primarily measured 1-2 seconds in length. Pharyngeal Phase  Laryngeal Elevation: (Patient exhibited sluggish, mild-moderately decreased laryngeal elevation for swallow airway protection.)  Pharyngeal Phase - Comment: No outward S/S penetration/aspiration was noted with any ice chip trial, regular solid consistency trial, mildly thick/nectar thick liquid trial, or thin H2O trial presented during assessment this date. At this time, would recommend continuation regular solid consistency with thin liquids. Recommend meds whole in pudding/applesauce. Will continue to follow.      Electronically signed by ROXANN Flood on 7/30/2022 at 1:42 PM

## 2022-07-30 NOTE — PROGRESS NOTES
Length of Stay:   LOS: 1 day      Chief complaint:  Chief Complaint   Patient presents with    Fatigue     Also notes diarrhea x1 week sent by pcp for workup and eval.          Subjective:  Resting in bed, feeling much better    Physical Examination:  /79   Pulse (!) 117   Temp 97 °F (36.1 °C) (Temporal)   Resp 24   Ht 5' 4\" (1.626 m)   Wt 116 lb 9 oz (52.9 kg)   SpO2 100%   BMI 20.01 kg/m²   Constitutional - Appropriately groomed, good nutritional status  Psychiatric - AOX3, baseline mood  Eyes - EOMI, conjunctivae and lids intact  ENMT - lips, teeth, gums intact; hearing intact  Respiratory - CTAB, no accessory muscle use  Cardiovascular - Clear S1, S2 no gross m/r/g; pedal pulses intact  Abd - Soft, non-tender; No gross hernia  MSK - UE and LE joints intact, no gross clubbing  Skin - No rashes or wounds; no gross capillary refill defects  Neurologic - CN 2-12 grossly intact, sensation intact    Medications:    azithromycin, 250 mg, IntraVENous, Q24H    cefTRIAXone (ROCEPHIN) IV, 1,000 mg, IntraVENous, Q24H    sodium chloride flush, 5-40 mL, IntraVENous, 2 times per day    heparin (porcine), 5,000 Units, SubCUTAneous, 3 times per day    calcium elemental, 500 mg, Oral, Daily    Vitamin D, 2,000 Units, Oral, Daily    clopidogrel, 75 mg, Oral, Daily    melatonin, 5 mg, Oral, Nightly    montelukast, 10 mg, Oral, Daily    metoprolol tartrate, 50 mg, Oral, BID    lactobacillus, 1 capsule, Oral, BID    trospium, 20 mg, Oral, Nightly      Problem list:  Principal Problem:    Right lower lobe pulmonary infiltrate  Active Problems:    Lactic acidosis    Dehydration    TANMAY (acute kidney injury) ([facility]  Resolved Problems:    * No resolved hospital problems.  *        A/P:  []Right lower lobe pulmonary infiltrate  Continue abs, Clinically improving  No SOB    TANMAY (acute kidney injury)/ Lactic acidosis/ Dehydration  Renal following,mild improvement in renal function  Mild hyperkalemia, repeat potassium pending Chayo Fletcher M.D.,  7/30/2022

## 2022-07-30 NOTE — ED PROVIDER NOTES
[facility] [place] [facility] EMERGENCY DEPT  eMERGENCY dEPARTMENT eNCOUnter      Pt Name: Hodan Wall  MRN: [id number]  Torresgfrich 10/14/1926  Date of evaluation: 7/29/2022  Provider: Burgess Baldev MD    [address]       Chief Complaint   Patient presents with    Fatigue     Also notes diarrhea x1 week sent by pcp for workup and eval.          HISTORY OF PRESENT ILLNESS   (Location/Symptom, Timing/Onset,Context/Setting, Quality, Duration, Modifying Factors, Severity)  Note limiting factors. Hodan Wall is a 80 y.o. female who presents to the emergency department patient presents with fatigue x1 week. She states she has had difficulty swallowing for 2 months. She has not seen a gastroenterologist.  Her daughter states that she feels patient may be dehydrated. HPI    NursingNotes were reviewed. REVIEW OF SYSTEMS    (2-9 systems for level 4, 10 or more for level 5)     Review of Systems   Constitutional:  Positive for fatigue. Negative for diaphoresis and fever. HENT:  Negative for sore throat and trouble swallowing. Eyes:  Negative for visual disturbance. Respiratory:  Negative for apnea, cough, chest tightness, shortness of breath and wheezing. Cardiovascular:  Negative for chest pain, palpitations and leg swelling. Gastrointestinal:  Negative for abdominal distention, abdominal pain, constipation, diarrhea and vomiting. Musculoskeletal:  Negative for back pain and myalgias. Skin:  Negative for pallor. Neurological:  Positive for weakness. Negative for dizziness, light-headedness and headaches. Psychiatric/Behavioral:  Negative for behavioral problems and suicidal ideas. All other systems reviewed and are negative.          PAST MEDICALHISTORY     Past Medical History:   Diagnosis Date    Arthritis     Cerebral artery occlusion with cerebral infarction [facility])     possible this admit     Chronic kidney disease     Essential hypertension 08/14/2017    Hypertension     Impacted cerumen of right ear 08/14/2017    Movement disorder     Palliative care patient 07/20/2021         SURGICAL HISTORY       Past Surgical History:   Procedure Laterality Date    APPENDECTOMY      BREAST BIOPSY Bilateral     CHOLECYSTECTOMY      HIP SURGERY Bilateral     TONSILLECTOMY           CURRENT MEDICATIONS     Previous Medications    AMOXICILLIN-CLAVULANATE (AUGMENTIN) 875-125 MG PER TABLET    TAKE 1 TABLET BY MOUTH TWICE DAILY FOR 10 DAYS    CALCIUM CARBONATE (OSCAL) 500 MG TABS TABLET    Take 500 mg by mouth daily    CELECOXIB (CELEBREX) 100 MG CAPSULE    TAKE ONE (1) CAPSULE BY MOUTH DAILY    CHOLECALCIFEROL (VITAMIN D3) 2000 UNITS CAPS    Take 1,000 Units by mouth daily     CLOPIDOGREL (PLAVIX) 75 MG TABLET    TAKE ONE (1) TABLET BY MOUTH DAILY     FLUTICASONE (FLONASE) 50 MCG/ACT NASAL SPRAY    USE 2 SPRAY IN EACH NOSTRIL DAILY AS NEEDED    MELATONIN 5 MG TABS TABLET    Take 5 mg by mouth nightly     METOPROLOL TARTRATE (LOPRESSOR) 50 MG TABLET    TAKE ONE (1) TABLET TWICE DAILY    MONTELUKAST (SINGULAIR) 10 MG TABLET    TAKE ONE TABLET BY MOUTH DAILY    SACCHAROMYCES BOULARDII (FLORASTOR) 250 MG CAPSULE    Take 250 mg by mouth 2 times daily    SOLIFENACIN (VESICARE) 10 MG TABLET    TAKE ONE (1) TABLET BY MOUTH EVERY MORNING FOR BLADDER       ALLERGIES     Patient has no known allergies. FAMILY HISTORY     No family history on file. SOCIAL HISTORY       Social History     Socioeconomic History    Marital status:    Tobacco Use    Smoking status: Never    Smokeless tobacco: Never   Substance and Sexual Activity    Alcohol use:  Yes     Alcohol/week: 7.0 standard drinks     Types: 7 Glasses of wine per week    Drug use: No    Sexual activity: Never     Social Determinants of Health     Financial Resource Strain: Low Risk     Difficulty of Paying Living Expenses: Not very hard   Food Insecurity: No Food Insecurity    Worried About Running Out of Food in the Last Year: Never true    Ran Out of Food in the Last Year: Never true       SCREENINGS    [place] Coma Scale  Eye Opening: Spontaneous  Best Verbal Response: Oriented  Best Motor Response: Obeys commands  [place] Coma Scale Score: 15        PHYSICAL EXAM    (up to 7 for level 4, 8 or more for level 5)     ED Triage Vitals [07/29/22 1448]   BP Temp Temp src Heart Rate Resp SpO2 Height Weight   104/62 97.8 °F (36.6 °C) -- 92 16 95 % -- 118 lb (53.5 kg)       Physical Exam  Vitals and nursing note reviewed. Constitutional:       Appearance: She is well-developed. HENT:      Head: Normocephalic and atraumatic. Eyes:      Conjunctiva/sclera: Conjunctivae normal.      Pupils: Pupils are equal, round, and reactive to light. Neck:      Trachea: No tracheal deviation. Cardiovascular:      Rate and Rhythm: Normal rate and regular rhythm. Heart sounds: Normal heart sounds. Pulmonary:      Effort: Pulmonary effort is normal. No respiratory distress. Breath sounds: Normal breath sounds. No wheezing or rales. Abdominal:      General: Bowel sounds are normal. There is no distension. Palpations: Abdomen is soft. Tenderness: There is no abdominal tenderness. Musculoskeletal:         General: Normal range of motion. Cervical back: Normal range of motion and neck supple. Skin:     General: Skin is warm and dry. Neurological:      General: No focal deficit present. Mental Status: She is alert and oriented to person, place, and time. Mental status is at baseline. Psychiatric:         Behavior: Behavior normal.         Thought Content:  Thought content normal.       DIAGNOSTIC RESULTS     EKG: All EKG's areinterpreted by the Emergency Department Physician who either signs or Co-signs this chart in the absence of a cardiologist.    A. fib, heart rate 100, nonspecific ST-T change    RADIOLOGY:  Non-plain film images such as CT, Ultrasound and MRI are read by the radiologist. Plain radiographic images are visualized and preliminarily interpreted bythe emergency physician with the below findings:    See below      XR CHEST PORTABLE   Final Result   Stable mild enlargement of the cardiac silhouette, diffuse increased interstitial markings, peribronchial wall thickening and atelectasis and /or infiltrate and trace right effusion in the right lower lung zone. Recommendation:    Follow up as clinically indicated.    Electronically Signed by Sia Del Valle MD at 29-Jul-2022 06:20:43 PM                       LABS:  Labs Reviewed   CBC WITH AUTO DIFFERENTIAL - Abnormal; Notable for the following components:       Result Value    WBC 11.1 (*)     MCH 31.1 (*)     Neutrophils % 74.0 (*)     Lymphocytes % 13.9 (*)     Monocytes % 11.1 (*)     Neutrophils Absolute 8.3 (*)     Monocytes Absolute 1.20 (*)     All other components within normal limits   URINALYSIS - Abnormal; Notable for the following components:    Ketones, Urine TRACE (*)     Protein, UA TRACE (*)     Leukocyte Esterase, Urine MODERATE (*)     All other components within normal limits   LACTIC ACID - Abnormal; Notable for the following components:    Lactic Acid 2.2 (*)     All other components within normal limits    Narrative:     CALL  [facility] tel. ,  Chemistry results called to and read back by [facility] RN/ER, 07/29/2022 17:24,  by [facility]   COMPREHENSIVE METABOLIC PANEL W/ REFLEX TO MG FOR LOW K - Abnormal; Notable for the following components:    BUN 42 (*)     Creatinine 1.5 (*)     GFR Non- 32 (*)     GFR  39 (*)     ALT 47 (*)     AST 41 (*)     All other components within normal limits   MICROSCOPIC URINALYSIS - Abnormal; Notable for the following components:    Bacteria, UA NEGATIVE (*)     Crystals, UA NEG (*)     Hyaline Casts, UA 10 (*)     WBC, UA 25 (*)     All other components within normal limits   COVID-19, RAPID   CULTURE, BLOOD 1   CULTURE, BLOOD 2   SPECIMEN REJECTION   LIPASE   SODIUM, URINE, RANDOM   CREATININE, RANDOM URINE OSMOLALITY, URINE   EOSINOPHIL SMEAR, URINE       All other labs were within normal range or not returned as of this dictation. EMERGENCY DEPARTMENT COURSE and DIFFERENTIAL DIAGNOSIS/MDM:   Vitals:    Vitals:    07/29/22 1448   BP: 104/62   Pulse: 92   Resp: 16   Temp: 97.8 °F (36.6 °C)   SpO2: 95%   Weight: 118 lb (53.5 kg)       MDM     Amount and/or Complexity of Data Reviewed  Clinical lab tests: ordered and reviewed  Tests in the radiology section of CPT®: ordered and reviewed    Risk of Complications, Morbidity, and/or Mortality  Presenting problems: moderate  Diagnostic procedures: moderate  Management options: moderate    Patient Progress  Patient progress: stable      Reassessment   patient stable throughout ED stay. Patient will be admitted for treatment of right lower lobe infiltrate and mild dehydration. Case discussed with hospitalist.    CONSULTS:  IP CONSULT TO NEPHROLOGY    PROCEDURES:  Unless otherwise noted below, none     Procedures    FINAL IMPRESSION      1. Other fatigue    2. Generalized weakness    3. Dehydration    4. Pneumonia of right lower lobe due to infectious organism          DISPOSITION/PLAN   DISPOSITION Admitted 07/29/2022 08:24:53 PM      PATIENT REFERRED TO:  No follow-up provider specified.     DISCHARGE MEDICATIONS:  New Prescriptions    No medications on file          (Please note that portions of this note were completed with a voice recognition program.  Efforts were made to edit thedictations but occasionally words are mis-transcribed.)    Venkata Mcconnell MD (electronically signed)  Attending Emergency Physician         Venkata Mcconnell MD  07/29/22 2052

## 2022-07-30 NOTE — H&P
[facility]      Hospitalist - History & Physical      PCP: Kimberly Perez MD    Date of Admission: 7/29/2022    Date of Service: 7/29/2022    Chief Complaint: Fatigue    History Of Present Illness: The patient is a 80 y.o. female who presented to [facility] ER with PMH HTN, CVA, CKD stage 3a, complaining of fatigue. Daughter is present in room and provides HPI. Patient states that she has had ongoing dysphagia for the last 2 months. Over the past few days she has had decreased appetite and oral intake, dizziness upon position change, diarrhea, and patient has been dry heaving today. Denies fever, chills, abdominal pain, shortness of breath, and chest pain. Denies visual change, loss of consciousness, syncope, and headache. Patient and daughter state she has never had EGD or esophageal dilatation. Patient was recently bitten by a cat and had received a tetanus shot at PCP office. Work-up in ER CXR atelectasis, infiltrate and trace right effusion and right lower lung, WBC 11.1, lactic acid 2.2, creatinine 1.5 BUN 42. Received 1 mL NS bolus, azithromycin 500 mg IV, and Zofran 4 mg IV while in ER. Patient is to be admitted to the hospitalist service due to right lower lobe pneumonia. Past Medical History:        Diagnosis Date    Arthritis     Cerebral artery occlusion with cerebral infarction [facility])     possible this admit     Chronic kidney disease     Essential hypertension 08/14/2017    Hypertension     Impacted cerumen of right ear 08/14/2017    Movement disorder     Palliative care patient 07/20/2021       Past Surgical History:        Procedure Laterality Date    APPENDECTOMY      BREAST BIOPSY Bilateral     CHOLECYSTECTOMY      HIP SURGERY Bilateral     TONSILLECTOMY         Home Medications:  Prior to Admission medications    Medication Sig Start Date End Date Taking?  Authorizing Provider   amoxicillin-clavulanate (AUGMENTIN) 875-125 MG per tablet TAKE 1 TABLET BY MOUTH TWICE DAILY FOR 10 DAYS 7/24/22   Historical Provider, MD   celecoxib (CELEBREX) 100 MG capsule TAKE ONE (1) CAPSULE BY MOUTH DAILY 6/6/22   Lilia Stewart MD   metoprolol tartrate (LOPRESSOR) 50 MG tablet TAKE ONE (1) TABLET TWICE DAILY 11/22/21   Lilia Stewart MD   montelukast (SINGULAIR) 10 MG tablet TAKE ONE TABLET BY MOUTH DAILY 11/22/21   Lilia Stewart MD   clopidogrel (PLAVIX) 75 MG tablet TAKE ONE (1) TABLET BY MOUTH DAILY  11/4/21   Lilia Stewart MD   solifenacin (VESICARE) 10 MG tablet TAKE ONE (1) TABLET BY MOUTH EVERY MORNING FOR BLADDER 11/4/21   Lilia Stewart MD   bisoprolol-hydroCHLOROthiazide (ZIAC) 2.5-6.25 MG per tablet TAKE ONE (1) TABLET BY MOUTH DAILY  9/24/21 3/27/22  Lilia Stewart MD   NIFEdipine (ADALAT CC) 30 MG extended release tablet Take 1 tablet by mouth daily 7/22/21 7/27/21  Donny Tenorio MD   fluticasone (FLONASE) 50 MCG/ACT nasal spray USE 2 SPRAY IN EACH NOSTRIL DAILY AS NEEDED 12/30/20   Lilia Stewart MD   aspirin 81 MG tablet Take 1 tablet by mouth daily 3/9/20 7/27/21  Lilia Stewart MD   calcium carbonate (OSCAL) 500 MG TABS tablet Take 500 mg by mouth daily    Historical Provider, MD   saccharomyces boulardii (FLORASTOR) 250 MG capsule Take 250 mg by mouth 2 times daily    Historical Provider, MD   Cholecalciferol (VITAMIN D3) 2000 UNITS CAPS Take 1,000 Units by mouth daily     Historical Provider, MD   melatonin 5 MG TABS tablet Take 5 mg by mouth nightly     Historical Provider, MD       Allergies:    Patient has no known allergies. Social History:    The patient currently lives home  Tobacco:   reports that she has never smoked. She has never used smokeless tobacco.  Alcohol:   reports current alcohol use of about 7.0 standard drinks per week. Illicit Drugs: denies    Family History:  No family history on file. Review of Systems:   Review of Systems   Constitutional:  Positive for activity change, appetite change and fatigue. Negative for diaphoresis and fever. HENT: Negative. Respiratory:  Positive for shortness of breath. Gastrointestinal:  Positive for diarrhea, nausea and vomiting. Negative for abdominal distention and abdominal pain. Genitourinary: Negative. Musculoskeletal:  Positive for arthralgias and myalgias. Skin:  Positive for pallor. Neurological:  Positive for dizziness, weakness and light-headedness. Negative for syncope, facial asymmetry and headaches. 14 point review of systems is negative except as specifically addressed above. Physical Examination:  /62   Pulse 92   Temp 97.8 °F (36.6 °C)   Resp 16   Wt 118 lb (53.5 kg)   SpO2 95%   BMI 20.25 kg/m²   Physical Exam  Vitals and nursing note reviewed. Constitutional:       General: She is not in acute distress. Appearance: Normal appearance. HENT:      Mouth/Throat:      Mouth: Mucous membranes are moist.      Pharynx: Oropharynx is clear. Eyes:      Extraocular Movements: Extraocular movements intact. Conjunctiva/sclera: Conjunctivae normal.      Pupils: Pupils are equal, round, and reactive to light. Cardiovascular:      Rate and Rhythm: Normal rate and regular rhythm. Pulses: Normal pulses. Heart sounds: Normal heart sounds. No murmur heard. Pulmonary:      Effort: Pulmonary effort is normal. No tachypnea or respiratory distress. Breath sounds: Decreased breath sounds present. Chest:      Chest wall: No tenderness. Abdominal:      General: Bowel sounds are normal. There is no distension. Palpations: Abdomen is soft. Tenderness: There is no abdominal tenderness. There is no guarding or rebound. Musculoskeletal:         General: No swelling. Normal range of motion. Cervical back: Normal range of motion and neck supple. No rigidity or tenderness. Right lower leg: No edema. Left lower leg: No edema. Skin:     General: Skin is warm and dry.       Comments: Scab noted to right ankle from prior cat bite   Neurological: General: No focal deficit present. Mental Status: She is alert and oriented to person, place, and time. Cranial Nerves: No cranial nerve deficit. Motor: No weakness. Psychiatric:         Mood and Affect: Mood normal.         Behavior: Behavior normal.        Diagnostic Data:  CBC:  Recent Labs     07/29/22  1650   WBC 11.1*   HGB 15.0   HCT 45.2        BMP:  Recent Labs     07/29/22  1746      K 5.0   CL 99   CO2 24   BUN 42*   CREATININE 1.5*   CALCIUM 9.6     Recent Labs     07/29/22  1746   AST 41*   ALT 47*   BILITOT 0.4   ALKPHOS 98       Urinalysis:  Lab Results   Component Value Date/Time    NITRU Negative 07/29/2022 08:11 PM    WBCUA 25 07/29/2022 08:11 PM    BACTERIA NEGATIVE 07/29/2022 08:11 PM    RBCUA 2 07/29/2022 08:11 PM    RBCUA 3 11/11/2015 11:02 AM    BLOODU Negative 07/29/2022 08:11 PM    SPECGRAV 1.020 07/29/2022 08:11 PM    GLUCOSEU Negative 07/29/2022 08:11 PM         XR CHEST PORTABLE    Result Date: 7/29/2022  Exam:X-RAY OF THE CHEST, PORTABLE Clinical data: Weak. Technique: Single view of the chest. Prior studies: Chest xray dated 07/19/2021, 05/02/2019. Findings: SinglePA view of the chest was performed. Judieth Cambria is midline. Stable mild enlargement of the cardiac silhouette, diffuse increased interstitial markings, peribronchial wall thickening and atelectasis and /or infiltrate and trace right effusion in the right lower lung zone. No pneumothorax or left pleural effusion. The soft tissues and bony structures demonstrate no acute pathology. Osteoarthritic changes in the shoulder joints. Stable mild enlargement of the cardiac silhouette, diffuse increased interstitial markings, peribronchial wall thickening and atelectasis and /or infiltrate and trace right effusion in the right lower lung zone. Recommendation: Follow up as clinically indicated.  Electronically Signed by José Miguel Love MD at 29-Jul-2022 06:20:43 PM Assessment/Plan:  Principal Problem:    Right lower lobe pulmonary infiltrate  - IV abx azithromycin and Rocephin              - Bronchodilators              - Encourage deep breathing and cough              - Incentive spirometry              - Supplemental oxygen as needed              - Follow blood cultures   Active Problems:    TANMAY (acute kidney injury)/ Lactic acidosis/ Dehydration      - Nephrology consultation and recommendations appreciated              - IVFs    -Urine studies   -Renal ultrasound              - Monitor I's and O's closely              - Monitor labs closely              - Avoid hypotension              - Avoid nephrotoxic agents    -Daily labs   -Trend lactic acid    DVT prophylaxis Heparin subcutaneously     Signed:  YOKO Reid - ARLET, 7/29/2022 8:30 PM

## 2022-07-30 NOTE — PROGRESS NOTES
Saranya Tijerina from Nephrology called back and she gave me some orders for urine, mag and phos. All the orders are in. She also said to put the orders under Dr. Matthew Robles which I did. Charge nurse notified.

## 2022-07-30 NOTE — PROGRESS NOTES
b4 Eyes Skin Assessment    Fariba Goddard is being assessed upon: Admission    I agree that I, Willa Mcadams RN, along with Lian Cordova, RN (either 2 RN's or 1 LPN and 1 RN) have performed a thorough Head to Toe Skin Assessment on the patient. ALL assessment sites listed below have been assessed. Areas assessed by both nurses:     [x]   Head, Face, and Ears   [x]   Shoulders, Back, and Chest  [x]   Arms, Elbows, and Hands   [x]   Coccyx, Sacrum, and Ischium  [x]   Legs, Feet, and Heels    Does the Patient have Skin Breakdown?  No    Kong Prevention initiated: No  Wound Care Orders initiated: No    WOC nurse consulted for Pressure Injury (Stage 3,4, Unstageable, DTI, NWPT, and Complex wounds) and New or Established Ostomies: No        Primary Nurse eSignature: Willa Mcadams RN on 7/29/2022 at 11:45 PM      Co-Signer eSignature: Electronically signed by Willa Mcadams RN on 7/29/22 at 11:45 PM CDT

## 2022-07-30 NOTE — PROGRESS NOTES
Donta Segundo arrived to room # 708. Presented with: right lower lobe infiltrate  Mental Status: Patient is oriented. Vitals:    07/29/22 2245   BP: 111/77   Pulse: (!) 105   Resp: 18   Temp:    SpO2: 96%     Patient safety contract and falls prevention contract reviewed with patient Yes. Oriented Patient to room. Call light within reach. Yes. Needs, issues or concerns expressed at this time: yes, .       Electronically signed by Terrie Amaro RN on 7/29/2022 at 11:45 PM

## 2022-07-30 NOTE — CONSULTS
Nephrology ([facility]) Consult Note      Patient: Gala Lozano  YOB: 1926  Date of Service: 7/30/2022  MRN: [id number]   Acct: [de-identified]   Primary Care Physician: Marbella Wu MD  Advance Directive: DNR  Admit Date: 7/29/2022       Hospital Day: 1  Referring Provider: Concepción Chicas MD    Patient independently seen and examined, Chart, Consults, Notes, Operative notes, Labs, Cardiology, and Radiology studies reviewed as available. Subjective: Gala Lozano is a 80 y.o. female for whom we were consulted for evaluation and treatment of acute kidney injury. No prior nephrologic evaluations they could recall. Recalled no home NSAID usage aside from aspirin. Family had noted poor oral intake recently which has been both a chronic issue as well as slightly worsening. Has had issues with swallowing and dysphagia. Also some diarrhea with nausea prior to admission. Allergies:  Patient has no known allergies.     Medicines:  Current Facility-Administered Medications   Medication Dose Route Frequency Provider Last Rate Last Admin    azithromycin (ZITHROMAX) 250 mg in dextrose 5 % 250 mL IVPB  250 mg IntraVENous Q24H Milvia Sotelo MD        cefTRIAXone (ROCEPHIN) 1,000 mg in sterile water 10 mL IV syringe  1,000 mg IntraVENous Q24H Milvia Sotelo MD        lactated ringers infusion   IntraVENous Continuous Milvia Sotelo  mL/hr at 07/29/22 2321 New Bag at 07/29/22 2321    sodium chloride flush 0.9 % injection 5-40 mL  5-40 mL IntraVENous 2 times per day Milvia Sotelo MD   10 mL at 07/30/22 0807    sodium chloride flush 0.9 % injection 5-40 mL  5-40 mL IntraVENous PRN Milvia Sotelo MD        0.9 % sodium chloride infusion   IntraVENous PRN Milvia Sotelo MD        ondansetron (ZOFRAN-ODT) disintegrating tablet 4 mg  4 mg Oral Q8H PRN Milvia Sotelo MD        Or    [facility]ansetron [facility]) injection 4 mg  4 mg IntraVENous Q6H PRN Milvia Sotelo MD Date    APPENDECTOMY      BREAST BIOPSY Bilateral     CHOLECYSTECTOMY      HIP SURGERY Bilateral     TONSILLECTOMY         Family History  History reviewed. No pertinent family history. Social History  Social History     Socioeconomic History    Marital status:      Spouse name: Not on file    Number of children: Not on file    Years of education: Not on file    Highest education level: Not on file   Occupational History    Not on file   Tobacco Use    Smoking status: Never    Smokeless tobacco: Never   Substance and Sexual Activity    Alcohol use: Yes     Alcohol/week: 7.0 standard drinks     Types: 7 Glasses of wine per week    Drug use: No    Sexual activity: Never   Other Topics Concern    Not on file   Social History Narrative    Not on file     Social Determinants of Health     Financial Resource Strain: Low Risk     Difficulty of Paying Living Expenses: Not very hard   Food Insecurity: No Food Insecurity    Worried About Running Out of Food in the Last Year: Never true    Ran Out of Food in the Last Year: Never true   Transportation Needs: Not on file   Physical Activity: Not on file   Stress: Not on file   Social Connections: Not on file   Intimate Partner Violence: Not on file   Housing Stability: Not on file         Review of Systems:  History obtained from chart review and the patient  General ROS: No fever or chills  Respiratory ROS: No cough, shortness of breath, wheezing  Cardiovascular ROS: No chest pain or palpitations  Gastrointestinal ROS: No abdominal pain or melena  Genito-Urinary ROS: No dysuria or hematuria  Musculoskeletal ROS: No joint pain or swelling   14 point ROS reviewed with the patient and negative except as noted above and in the HPI unless unable to obtain.     Objective:  Patient Vitals for the past 24 hrs:   BP Temp Temp src Pulse Resp SpO2 Height Weight   07/30/22 1032 130/79 97 °F (36.1 °C) Temporal (!) 117 24 100 % -- --   07/30/22 0400 (!) 97/59 97.3 °F (36.3 °C) PO2, HCO3, O2SAT in the last 72 hours. CRP:  No results for input(s): CRP in the last 72 hours. Sed Rate:  No results for input(s): SEDRATE in the last 72 hours. Cultures:   No results for input(s): CULTURE in the last 72 hours. No results for input(s): BC, Gerhardt Hones in the last 72 hours. No results for input(s): CXSURG in the last 72 hours. Radiology reports as per the Radiologist  Radiology: XR CHEST PORTABLE    Result Date: 7/29/2022  Exam:X-RAY OF THE CHEST, PORTABLE Clinical data: Weak. Technique: Single view of the chest. Prior studies: Chest xray dated 07/19/2021, 05/02/2019. Findings: SinglePA view of the chest was performed. Dortha Plum is midline. Stable mild enlargement of the cardiac silhouette, diffuse increased interstitial markings, peribronchial wall thickening and atelectasis and /or infiltrate and trace right effusion in the right lower lung zone. No pneumothorax or left pleural effusion. The soft tissues and bony structures demonstrate no acute pathology. Osteoarthritic changes in the shoulder joints. Stable mild enlargement of the cardiac silhouette, diffuse increased interstitial markings, peribronchial wall thickening and atelectasis and /or infiltrate and trace right effusion in the right lower lung zone. Recommendation: Follow up as clinically indicated. Electronically Signed by Estephanie Caban MD at 29-Jul-2022 06:20:43 PM             US RETROPERITONEAL COMPLETE    Result Date: 7/30/2022  Exam: RETROPERITONEAL COMPLETE ULTRASOUND Clinical data:Acute kidney injury. Technique: Real-time grayscale imaging of the retroperitoneum was performed. Images supplemented with color Doppler technique. Prior studies: No prior studies submitted. Findings: The right kidney measures 8.7 x 3.9 x 5.4 cm with cortical thickness measuring 0.7 cm. There is minimal ascites visualized around the right kidney. Normal renal size, contour, cortical thickness, and echogenicity are preserved.  No evidence of renal masses. No evidence of renal calculi. There is no evidence of hydronephrosis. The left kidney measures 8 x 4 x 4.1 cm with cortical thickness measuring 0.8 cm. Normal renal size, contour, cortical thickness, and echogenicity are preserved. No evidence of renal masses. No evidence of renal calculi. There is no evidence of hydronephrosis. Abdominal aorta is not definitively imaged. The IVC is not definitively imaged. Minimal ascites around the right kidney. Nondiagnostic evaluation of the aorta and IVC. Recommendation: Follow up as clinically indicated. Electronically Signed by Terry Trammell MD at 30-Jul-2022 11:32:43 AM                Assessment   Acute kidney injury/prerenal  Hyperkalemia  Acute cystitis without hematuria  Pneumonia    Plan:  Discussed with patient, nursing, family  Work-up ordered and ongoing  Monitor labs  IV fluid trial  Antibiotics per primary service  Reassess in the morning  Patient was given Kayexalate for potassium, would suggest Pete Beech as the appropriate formulary agent at this facility for further medical management as needed      Thank you for the consult, we appreciate the opportunity to provide care to your patients. Feel free to contact me if I can be of any further assistance.       Stephen Carranza MD  07/30/22  11:39 AM

## 2022-07-30 NOTE — PLAN OF CARE
Problem: Discharge Planning  Goal: Discharge to home or other facility with appropriate resources  Outcome: Progressing  Flowsheets (Taken 7/29/2022 8089)  Discharge to home or other facility with appropriate resources: Identify barriers to discharge with patient and caregiver     Problem: Safety - Adult  Goal: Free from fall injury  Outcome: Progressing     Problem: ABCDS Injury Assessment  Goal: Absence of physical injury  Outcome: Progressing

## 2022-07-31 NOTE — PLAN OF CARE
Problem: Safety - Adult  Goal: Free from fall injury  Outcome: Progressing     Problem: Safety - Adult  Goal: Free from fall injury  Outcome: Progressing     Problem: ABCDS Injury Assessment  Goal: Absence of physical injury  Outcome: Progressing     Problem: Chronic Conditions and Co-morbidities  Goal: Patient's chronic conditions and co-morbidity symptoms are monitored and maintained or improved  Outcome: Progressing  Flowsheets (Taken 7/30/2022 2045 by Nicole Stapleton RN)  Care Plan - Patient's Chronic Conditions and Co-Morbidity Symptoms are Monitored and Maintained or Improved: Monitor and assess patient's chronic conditions and comorbid symptoms for stability, deterioration, or improvement     Problem: Discharge Planning  Goal: Discharge to home or other facility with appropriate resources  Outcome: Progressing  Flowsheets (Taken 7/30/2022 2045 by Nicole Stapleton RN)  Discharge to home or other facility with appropriate resources:   Identify barriers to discharge with patient and caregiver   Arrange for needed discharge resources and transportation as appropriate   Identify discharge learning needs (meds, wound care, etc)

## 2022-07-31 NOTE — PROGRESS NOTES
[facility] Pharmacokinetic Monitoring Service - Vancomycin     Jeffrey Samaniego is a 80 y.o. female starting on vancomycin therapy for pneumonia (HAP). Pharmacy consulted by Dr Elmira Gross for monitoring and adjustment. Target Concentration: Goal AUC/MYLES 400-600 mg*hr/L    Additional Antimicrobials: rocephin and azithromycin    Pertinent Laboratory Values: Wt Readings from Last 1 Encounters:   07/31/22 120 lb 6.4 oz (54.6 kg)     Temp Readings from Last 1 Encounters:   07/31/22 97.5 °F (36.4 °C) (Temporal)     Estimated Creatinine Clearance: 21 mL/min (A) (based on SCr of 1.4 mg/dL (H)). Recent Labs     07/30/22  0210 07/31/22  0122   CREATININE 1.4* 1.4*   WBC 10.6 13.4*       Pertinent Cultures:  Culture Date Source Results   7/29/22 Blood x2 G(+) cocci in clusters resembling staph   7/31/22 Blood x2 Sent    MRSA Nasal Swab: not ordered. Order placed by pharmacy.     Plan:  Concentration-guided dosing due to renal impairment/insufficiency  Start vancomycin pulse dosing with a 1250mg IV load today  Renal labs as indicated   Vancomycin concentration ordered for 08/01/22 @ 1600   Pharmacy will continue to monitor patient and adjust therapy as indicated    Thank you for the consult,  Miguel A Escoto, [facility]  7/31/2022 3:16 PM

## 2022-07-31 NOTE — PROGRESS NOTES
Physical Therapy  Facility/Department: [facility] PROGRESSIVE CARE  Physical Therapy Initial Assessment    Name: Jackelin Amezquita  : 10/14/1926  MRN: [id number]  Date of Service: 2022    Discharge Recommendations:  Continue to assess pending progress, Patient would benefit from continued therapy after discharge          Patient Diagnosis(es): The primary encounter diagnosis was Other fatigue. Diagnoses of Generalized weakness, Dehydration, and Pneumonia of right lower lobe due to infectious organism were also pertinent to this visit. Past Medical History:  has a past medical history of Arthritis, Cerebral artery occlusion with cerebral infarction [facility]), Chronic kidney disease, Essential hypertension, Hypertension, Impacted cerumen of right ear, Movement disorder, and Palliative care patient. Past Surgical History:  has a past surgical history that includes hip surgery (Bilateral); Tonsillectomy; Cholecystectomy; Appendectomy; and Breast biopsy (Bilateral). Assessment   Body Structures, Functions, Activity Limitations Requiring Skilled Therapeutic Intervention: Decreased functional mobility ; Decreased strength;Decreased balance;Decreased endurance;Decreased posture  Assessment: Pt would benefit from skilled PT in this setting to progress her mobility as well as over all strength and endurance.  If pt were to d/c home at her current level of function, would advise  assist supervision for ADL's/mobility as well as HH PT. pT would also be a good candidate for short term rehab  Therapy Prognosis: Good  Decision Making: Low Complexity  Requires PT Follow-Up: Yes  Activity Tolerance  Activity Tolerance: Patient tolerated treatment well;Patient limited by endurance     Plan   Plan  Plan: 5-7 times per week  Plan weeks: 2  Current Treatment Recommendations: Strengthening, Functional mobility training, Transfer training, Endurance training, Gait training, Safety education & training, Patient/Caregiver education & training, Therapeutic activities  Plan Comment: progress ambulation as tolerated by fatigue. fatigues quickly  Safety Devices  Type of Devices: Call light within reach, Gait belt, Left in bed, Bed alarm in place, Nurse notified     Restrictions        Subjective   General  Diagnosis: PNA, FATIGUE  Follows Commands: Within Functional Limits  Subjective  Subjective: pt STATES SHE WANTED TO AMB BUT THEN FELT WEAK AND DIZZY AND HAD TO SIT BEFORE COMPLETING AMB TO THE TOILET. RN NOTIFIED         Social/Functional History  Social/Functional History  Lives With: Alone  Home Equipment: Walker, rolling  ADL Assistance: Independent  Ambulation Assistance: Independent  Transfer Assistance: Independent  Vision/Hearing  Vision  Vision: Within Functional Limits  Hearing  Hearing: Within functional limits    Cognition   Orientation  Overall Orientation Status: Within Functional Limits  Cognition  Overall Cognitive Status: WFL     Objective   Heart Rate: (!) 103  Heart Rate Source: Monitor  BP: 106/76  BP Location: Right upper arm  Patient Position: Sitting  MAP (Calculated): 86  Resp: 16  SpO2: 92 %  O2 Device: None (Room air)     Observation/Palpation  Observation: wearing a depend        AROM RLE (degrees)  RLE AROM: WFL  AROM LLE (degrees)  LLE AROM : WFL  Strength Other  Other: grossly antigravity           Bed mobility  Sit to Supine: Stand by assistance  Transfers  Sit to Stand: Contact guard assistance;Minimal Assistance (depends on the height of the surface)  Stand to sit: Contact guard assistance  Ambulation  Surface: level tile  Device: Rolling Walker  Assistance: Contact guard assistance  Quality of Gait: flexed posture,  Gait Deviations: Slow Suzanna;Decreased step length;Decreased step height  Distance: 15 ft, 10 ft  Comments: fatigued.      Balance  Comments: noted Pt to be unsteady in standing when attempting to manage clothing after toileting             Goals  Short Term Goals  Time Frame for Short term goals: 2 wks  Short term goal 1: sit<>stand, sba  Short term goal 2: amb 150 ft with rw, sba       Education  Patient Education  Education Given To: Patient  Education Provided: Role of Therapy;Plan of Care;Transfer Training  Education Method: Verbal  Barriers to Learning: None  Education Outcome: Verbalized understanding      Therapy Time   Individual Concurrent Group Co-treatment   Time In           Time Out           Minutes                   Hope Parada PT     Electronically signed by Hope Paraad PT on 7/31/2022 at 1:31 PM

## 2022-07-31 NOTE — PROGRESS NOTES
Length of Stay:   LOS: 2 days      Chief complaint:  Chief Complaint   Patient presents with    Fatigue     Also notes diarrhea x1 week sent by pcp for workup and eval.          Subjective:  Resting in bed, feeling much better    Physical Examination:  /76   Pulse (!) 107   Temp 97.5 °F (36.4 °C) (Temporal)   Resp 16   Ht 5' 4\" (1.626 m)   Wt 120 lb 6.4 oz (54.6 kg)   SpO2 92%   BMI 20.67 kg/m²   Constitutional - Appropriately groomed, good nutritional status  Psychiatric - AOX3, baseline mood  Eyes - EOMI, conjunctivae and lids intact  ENMT - lips, teeth, gums intact; hearing intact  Respiratory - CTAB, no accessory muscle use  Cardiovascular - Clear S1, S2 no gross m/r/g; pedal pulses intact  Abd - Soft, non-tender; No gross hernia  MSK - UE and LE joints intact, no gross clubbing  Skin - No rashes or wounds; no gross capillary refill defects  Neurologic - CN 2-12 grossly intact, sensation intact    Medications:    furosemide, 40 mg, IntraVENous, Once    cefTRIAXone (ROCEPHIN) IV, 1,000 mg, IntraVENous, Q24H    sodium chloride flush, 5-40 mL, IntraVENous, 2 times per day    heparin (porcine), 5,000 Units, SubCUTAneous, 3 times per day    calcium elemental, 500 mg, Oral, Daily    clopidogrel, 75 mg, Oral, Daily    melatonin, 5 mg, Oral, Nightly    montelukast, 10 mg, Oral, Daily    metoprolol tartrate, 50 mg, Oral, BID    lactobacillus, 1 capsule, Oral, BID    trospium, 20 mg, Oral, Nightly      Problem list:  Principal Problem:    Right lower lobe pulmonary infiltrate  Active Problems:    Lactic acidosis    Dehydration    TANMAY (acute kidney injury) ([facility], [place] [address]  Resolved Problems:    * No resolved hospital problems.  *        A/P:  []Right lower lobe pulmonary infiltrate, bacteremia  Continue abs, Clinically improving  No SOB  Repeat BC pending    TANMAY   Renal following  Elevated BNP monitor for gross fluid overload  TTE pending eval cardiac function  Hyperkalemia now resolved    Afib w/RVR  On low dose diltiazem drip  Repeat EKG pending  Continue BB     Reno Sung M.D.,  7/31/2022

## 2022-07-31 NOTE — PROGRESS NOTES
Nephrology ([facility]) Consult Note      Patient: Edinson Riddle  YOB: 1926  Date of Service: 7/31/2022  MRN: [id number]   Acct: [de-identified]   Primary Care Physician: Jose Ortiz MD  Advance Directive: DNR  Admit Date: 7/29/2022       Hospital Day: 2  Referring Provider: Balbina Ahn MD    Patient independently seen and examined, Chart, Consults, Notes, Operative notes, Labs, Cardiology, and Radiology studies reviewed as available. Subjective: Edinson Riddle is a 80 y.o. female for whom we were consulted for evaluation and treatment of acute kidney injury. No prior nephrologic evaluations they could recall. Recalled no home NSAID usage aside from aspirin. Family had noted poor oral intake recently which has been both a chronic issue as well as slightly worsening. Has had issues with swallowing and dysphagia. Also some diarrhea with nausea prior to admission. Today, no overnight events. She was feeling better and denied chest pain, shortness of air at rest, nausea or vomiting. Allergies:  Patient has no known allergies.     Medicines:  Current Facility-Administered Medications   Medication Dose Route Frequency Provider Last Rate Last Admin    vancomycin (VANCOCIN) intermittent dosing (placeholder)   Other RX Placeholder Balbina Ahn MD        vancomycin (VANCOCIN) 1,250 mg in dextrose 5 % 250 mL IVPB  1,250 mg IntraVENous Once Balbina Ahn .7 mL/hr at 07/31/22 1623 1,250 mg at 07/31/22 1623    dilTIAZem HCl in sodium chloride 125 mg / 125 mL infusion  2.5-15 mg/hr IntraVENous Continuous Tomasa Morrison MD 5 mL/hr at 07/30/22 2244 5 mg/hr at 07/30/22 2244    cefTRIAXone (ROCEPHIN) 1,000 mg in sterile water 10 mL IV syringe  1,000 mg IntraVENous Q24H Tomasa Morrison MD   1,000 mg at 07/31/22 1624    sodium chloride flush 0.9 % injection 5-40 mL  5-40 mL IntraVENous 2 times per day Tomasa Morrison MD   10 mL at 07/31/22 0806    sodium chloride flush 0.9 % injection 5-40 mL  5-40 mL IntraVENous PRN Justine Lucas MD        0.9 % sodium chloride infusion   IntraVENous PRN Justine Lucas MD        ondansetron (ZOFRAN-ODT) disintegrating tablet 4 mg  4 mg Oral Q8H PRN Justine Lucas MD        Or    ondansetron [facility]) injection 4 mg  4 mg IntraVENous Q6H PRN Justine Lucas MD        acetaminophen (TYLENOL) tablet 650 mg  650 mg Oral Q6H PRN Justine Lucas MD        Or    acetaminophen (TYLENOL) suppository 650 mg  650 mg Rectal Q6H PRN Justine Lucas MD        polyethylene glycol (GLYCOLAX) packet 17 g  17 g Oral Daily PRN Justine Lucas MD        melatonin disintegrating tablet 5 mg  5 mg Oral Nightly PRN Justine Lucas MD        calcium carbonate (TUMS) chewable tablet 500 mg  500 mg Oral TID PRN Justine Lucas MD        heparin (porcine) injection 5,000 Units  5,000 Units SubCUTAneous 3 times per day Justine Lucas MD   5,000 Units at 07/31/22 1330    calcium elemental (OSCAL) tablet 500 mg  500 mg Oral Daily Justine Lucas MD   500 mg at 07/31/22 3912    clopidogrel (PLAVIX) tablet 75 mg  75 mg Oral Daily uJstine Lucas MD   75 mg at 07/31/22 7089    melatonin disintegrating tablet 5 mg  5 mg Oral Nightly Justine Lucas MD   5 mg at 07/30/22 1928    fluticasone (FLONASE) 50 MCG/ACT nasal spray 2 spray  2 spray Each Nostril Daily PRN Justine Lucas MD        montelukast (SINGULAIR) tablet 10 mg  10 mg Oral Daily Justine Lucas MD   10 mg at 07/31/22 0803    metoprolol tartrate (LOPRESSOR) tablet 50 mg  50 mg Oral BID Justine Lucas MD   50 mg at 07/31/22 0803    lactobacillus (CULTURELLE) capsule 1 capsule  1 capsule Oral BID Justine Lucas MD   1 capsule at 07/31/22 0803    trospium (SANCTURA) tablet 20 mg  20 mg Oral Nightly Justine Lucas MD   20 mg at 07/30/22 1928       Past Medical History:  Past Medical History:   Diagnosis Date    Arthritis     Cerebral artery occlusion with cerebral infarction [facility])     possible this admit     Chronic kidney disease Essential hypertension 08/14/2017    Hypertension     Impacted cerumen of right ear 08/14/2017    Movement disorder     Palliative care patient 07/20/2021       Past Surgical History:  Past Surgical History:   Procedure Laterality Date    APPENDECTOMY      BREAST BIOPSY Bilateral     CHOLECYSTECTOMY      HIP SURGERY Bilateral     TONSILLECTOMY         Family History  History reviewed. No pertinent family history. Social History  Social History     Socioeconomic History    Marital status:      Spouse name: Not on file    Number of children: Not on file    Years of education: Not on file    Highest education level: Not on file   Occupational History    Not on file   Tobacco Use    Smoking status: Never    Smokeless tobacco: Never   Substance and Sexual Activity    Alcohol use:  Yes     Alcohol/week: 7.0 standard drinks     Types: 7 Glasses of wine per week    Drug use: No    Sexual activity: Never   Other Topics Concern    Not on file   Social History Narrative    Not on file     Social Determinants of Health     Financial Resource Strain: Low Risk     Difficulty of Paying Living Expenses: Not very hard   Food Insecurity: No Food Insecurity    Worried About Running Out of Food in the Last Year: Never true    Ran Out of Food in the Last Year: Never true   Transportation Needs: Not on file   Physical Activity: Not on file   Stress: Not on file   Social Connections: Not on file   Intimate Partner Violence: Not on file   Housing Stability: Not on file         Review of Systems:  History obtained from chart review and the patient  General ROS: No fever or chills  Respiratory ROS: No cough, shortness of breath, wheezing  Cardiovascular ROS: No chest pain or palpitations  Gastrointestinal ROS: No abdominal pain or melena  Genito-Urinary ROS: No dysuria or hematuria  Musculoskeletal ROS: No joint pain or swelling   14 point ROS reviewed with the patient and negative except as noted above and in the HPI unless unable to obtain. Objective:  Patient Vitals for the past 24 hrs:   BP Temp Temp src Pulse Resp SpO2 Weight   07/31/22 1648 123/88 (!) 96.3 °F (35.7 °C) Temporal 98 16 92 % --   07/31/22 1441 119/76 97.5 °F (36.4 °C) Temporal (!) 107 16 92 % --   07/31/22 1235 106/76 97 °F (36.1 °C) Temporal (!) 103 16 92 % --   07/31/22 0437 119/72 97 °F (36.1 °C) Temporal (!) 118 18 90 % --   07/31/22 0436 -- -- -- -- -- -- 120 lb 6.4 oz (54.6 kg)   07/31/22 0032 97/64 97.3 °F (36.3 °C) Temporal 97 18 92 % --   07/30/22 2045 -- -- -- 98 -- -- --   07/30/22 1814 (!) 99/59 98.2 °F (36.8 °C) Temporal (!) 131 22 -- --       Intake/Output Summary (Last 24 hours) at 7/31/2022 1658  Last data filed at 7/31/2022 1441  Gross per 24 hour   Intake 460 ml   Output 225 ml   Net 235 ml     General: awake/alert   Chest:  clear to auscultation bilaterally  CVS: regular rate and rhythm  Abdominal: soft, nontender, normal bowel sounds  Extremities: no cyanosis or edema  Skin: warm and dry without rash      Labs:  BMP:   Recent Labs     07/29/22  1746 07/30/22  0210 07/30/22  0740 07/30/22  1758 07/31/22  0122    136  --   --  137   K 5.0 5.3*  --  4.9 4.1   CL 99 102  --   --  104   CO2 24 22  --   --  19*   PHOS  --   --  4.1  --   --    BUN 42* 41*  --   --  38*   CREATININE 1.5* 1.4*  --   --  1.4*   CALCIUM 9.6 9.0  --   --  8.7     CBC:   Recent Labs     07/29/22  1650 07/30/22  0210 07/31/22  0122   WBC 11.1* 10.6 13.4*   HGB 15.0 13.4 13.2   HCT 45.2 42.9 41.1   MCV 93.6 96.0 95.8    216 215     LIVER PROFILE:   Recent Labs     07/29/22  1746   AST 41*   ALT 47*   LIPASE 21   BILITOT 0.4   ALKPHOS 98     PT/INR: No results for input(s): PROTIME, INR in the last 72 hours. APTT: No results for input(s): APTT in the last 72 hours. BNP:  No results for input(s): BNP in the last 72 hours. Ionized Calcium:No results for input(s): IONCA in the last 72 hours.   Magnesium:  Recent Labs     07/30/22  0740   MG 2.1 Phosphorus:  Recent Labs     07/30/22  0740   PHOS 4.1     HgbA1C: No results for input(s): LABA1C in the last 72 hours. Hepatic:   Recent Labs     07/29/22  1746   ALKPHOS 98   ALT 47*   AST 41*   PROT 6.9   BILITOT 0.4   LABALBU 3.6     Lactic Acid:   Recent Labs     07/31/22  1518   LACTA 3.1*     Troponin: No results for input(s): CKTOTAL, CKMB, TROPONINT in the last 72 hours. ABGs: No results for input(s): PH, PCO2, PO2, HCO3, O2SAT in the last 72 hours. CRP:  No results for input(s): CRP in the last 72 hours. Sed Rate:  No results for input(s): SEDRATE in the last 72 hours. Cultures:   No results for input(s): CULTURE in the last 72 hours. Recent Labs     07/29/22  1936   BC Gram stain Aerobic bottle: Bottle volume = <5 ml  Quality of results might be  affected with low volume. Gram positive cocci in clusters  resembling Staphylococcus  Culture in progress  Please notify Physician  *   BLOODCULT2 No Growth to date. Any change in status will be called. No results for input(s): CXSURG in the last 72 hours. Radiology reports as per the Radiologist  Radiology: XR CHEST PORTABLE    Result Date: 7/29/2022  Exam:X-RAY OF THE CHEST, PORTABLE Clinical data: Weak. Technique: Single view of the chest. Prior studies: Chest xray dated 07/19/2021, 05/02/2019. Findings: SinglePA view of the chest was performed. Doree Duel is midline. Stable mild enlargement of the cardiac silhouette, diffuse increased interstitial markings, peribronchial wall thickening and atelectasis and /or infiltrate and trace right effusion in the right lower lung zone. No pneumothorax or left pleural effusion. The soft tissues and bony structures demonstrate no acute pathology. Osteoarthritic changes in the shoulder joints.     Stable mild enlargement of the cardiac silhouette, diffuse increased interstitial markings, peribronchial wall thickening and atelectasis and /or infiltrate and trace right effusion in the right lower lung zone. Recommendation: Follow up as clinically indicated. Electronically Signed by Constantine Kong MD at 29-Jul-2022 06:20:43 PM             US RETROPERITONEAL COMPLETE    Result Date: 7/30/2022  Exam: RETROPERITONEAL COMPLETE ULTRASOUND Clinical data:Acute kidney injury. Technique: Real-time grayscale imaging of the retroperitoneum was performed. Images supplemented with color Doppler technique. Prior studies: No prior studies submitted. Findings: The right kidney measures 8.7 x 3.9 x 5.4 cm with cortical thickness measuring 0.7 cm. There is minimal ascites visualized around the right kidney. Normal renal size, contour, cortical thickness, and echogenicity are preserved. No evidence of renal masses. No evidence of renal calculi. There is no evidence of hydronephrosis. The left kidney measures 8 x 4 x 4.1 cm with cortical thickness measuring 0.8 cm. Normal renal size, contour, cortical thickness, and echogenicity are preserved. No evidence of renal masses. No evidence of renal calculi. There is no evidence of hydronephrosis. Abdominal aorta is not definitively imaged. The IVC is not definitively imaged. Minimal ascites around the right kidney. Nondiagnostic evaluation of the aorta and IVC. Recommendation: Follow up as clinically indicated. Electronically Signed by Radha Schaffer MD at 30-Jul-2022 11:32:43 AM                Assessment   Acute kidney injury/prerenal  Hyperkalemia  Acute cystitis without hematuria  Pneumonia    Plan:  Discussed with patient, nursing  Work-up reviewed to date  Monitor labs  IV fluid trial has helped a bit, would continue  Antibiotics per primary service  Reassess in the morning  Patient was initially given Kayexalate for potassium, would suggest Melissa Leak as the appropriate formulary agent at this facility for further medical management as needed      Thank you for the consult, we appreciate the opportunity to provide care to your patients.   Feel free to contact me if I can be of any further assistance.       Pepe Dumont MD  07/31/22  4:58 PM

## 2022-08-01 NOTE — PROGRESS NOTES
Pt called out that she needed to use the br. Was assisted to the bsc x1. While ambulating back to bed pt reported that she was soa. Cna reported that pts lips were bluish. 02 sat was fluctuating between 88-89%. Nurse called to room. Pt stated that her upper shoulders/back had started hurting, but that it could have been from the way she was sleeping. Pt was placed on [email or web address] sats up to 93. Lungs sounds diminished through out with expiratory wheezing. Dyspnea noted at rest. Color has returned her lips. Pt states that she had also been using her incentive spirometer prior to this episode. Pt states that the pain in her back shoulder area was rated at 9, the stated, \"I dont know if it was that bad but I know it really hurts. \" Lab notifed to come draw am labs now. Pt states that she has been soa for about 2 months but \"not this severe. \" Pt has head of bed raised.  Electronically signed by Leelee Gordon RN on 8/1/2022 at 2:09 AM

## 2022-08-01 NOTE — PROGRESS NOTES
Dr. Lachelle Lay notified of pts change.  New orders received Electronically signed by Obey Colon RN on 8/1/2022 at 2:28 AM

## 2022-08-01 NOTE — PROCEDURES
Nurse was called to ECHO room 3 by ECHO tech. Patient was found to have agonal respirations. HR was 50 per monitor with weak pulse. 100% O2 per PMR bag initiated. Rapid response called with Dr. Tj Lucero and Teresa Gilliam responding. HR decreased to 45 and no palpable pulse. CPR started. Patient was found to be a DNR and compressions were stopped per orders Dr. Tj Lucero, 02 continued and patient was taken back to here room with nurse.

## 2022-08-01 NOTE — DISCHARGE SUMMARY
answer. Will attempt to contact family again.       DC Medications:  See Home medication reconciliation    Condition on Discharge:  Death    Disposition:  Death    Recommendations/Follow-up:  Death      Reno Sung M.D.,  8/1/2022

## 2022-08-01 NOTE — CARE COORDINATION
Patient's daughter Kalpesh Delay just arrived and took the patient's personal belongings. Just as we were leaving the room, a representative from [facility] has arrived to take the patient to [facility].

## 2022-08-03 LAB — CULTURE, BLOOD 2: NORMAL

## 2022-08-05 LAB
BLOOD CULTURE, ROUTINE: NORMAL
CULTURE, BLOOD 2: NORMAL

## 2022-09-08 ENCOUNTER — TELEPHONE (OUTPATIENT)
Dept: INTERNAL MEDICINE | Age: 87
End: 2022-09-08

## 2022-09-08 NOTE — TELEPHONE ENCOUNTER
----- Message from Berhane Gutierres sent at 9/8/2022  9:34 AM CDT -----  Subject: Message to Provider    QUESTIONS  Information for Provider? pt has passed and will need to be marked in the   system. thank you  ---------------------------------------------------------------------------  --------------  Sánchez Watt INFO  [phone]; Do not leave any message, patient will call back for answer  ---------------------------------------------------------------------------  --------------  SCRIPT ANSWERS  Relationship to Patient? Other  Representative Name? Yao Alexander  Is the Representative on the appropriate HIPAA document in Epic?  Yes

## 2022-09-13 NOTE — ED NOTES
Repeat lab sent - pt instructed to press call light when she had the ability to provide urine sample      [facility]  07/29/22 1800 Detail Level: Zone Hide Include Location In Plan Question?: No

## 2023-01-26 NOTE — CARE COORDINATION
Patient was brought back up from echo by clinical house transporting. Dr. Mindy Schultz entered the patient's room and pronounced time of death at 26. The patient while in the echo department heart spontaneously stopped. As she is a DNR, no code was performed. Clinical House and myself cleaned the patient. The patient's daughter, Efraín Eldridge, was contacted by phone. Respect and condolences were given. She asked for us to have the  home in [facility] to transport the patient there and advised she and her brother would see the patient in [facility]. [facility] was contacted by [employer] and states they will arrive soon this morning to transport the patient. pt with dizziness  increase with change position of head  pe as documented  agree miranda alegre and emma
